# Patient Record
Sex: MALE | Race: WHITE | NOT HISPANIC OR LATINO | Employment: OTHER | URBAN - METROPOLITAN AREA
[De-identification: names, ages, dates, MRNs, and addresses within clinical notes are randomized per-mention and may not be internally consistent; named-entity substitution may affect disease eponyms.]

---

## 2017-01-23 RX ORDER — ESCITALOPRAM OXALATE 5 MG/1
TABLET ORAL
Qty: 30 TABLET | Refills: 0 | Status: SHIPPED | OUTPATIENT
Start: 2017-01-23 | End: 2017-03-05 | Stop reason: SDUPTHER

## 2017-01-25 ENCOUNTER — TELEPHONE (OUTPATIENT)
Dept: SLEEP MEDICINE | Facility: HOSPITAL | Age: 72
End: 2017-01-25

## 2017-01-25 NOTE — TELEPHONE ENCOUNTER
Patient called yesterday and today irate about needing a RX of Mirapex. The RX was faxed yesterday at 4:45 pm after the doctors were gone for the day. There was no one to sign yesterday. The patient had an appointment scheduled for 1/16/17 that he no showed for and It has been over a year since he was seen. I spoke with Kristen Abraham and she said call in 30 day supply and make appointment if patient doesn't keep appointment he won't have anymore called in.

## 2017-01-31 DIAGNOSIS — E55.9 VITAMIN D DEFICIENCY: Primary | ICD-10-CM

## 2017-01-31 DIAGNOSIS — E11.69 TYPE 2 DIABETES MELLITUS WITH OTHER SPECIFIED COMPLICATION (HCC): ICD-10-CM

## 2017-01-31 DIAGNOSIS — IMO0002 UNCONTROLLED TYPE 2 DIABETES MELLITUS WITH OTHER SPECIFIED COMPLICATION: ICD-10-CM

## 2017-01-31 DIAGNOSIS — E78.5 HYPERLIPIDEMIA, UNSPECIFIED HYPERLIPIDEMIA TYPE: Primary | ICD-10-CM

## 2017-01-31 DIAGNOSIS — E34.9 TESTOSTERONE DEFICIENCY: ICD-10-CM

## 2017-02-16 ENCOUNTER — OFFICE VISIT (OUTPATIENT)
Dept: ENDOCRINOLOGY | Age: 72
End: 2017-02-16

## 2017-02-16 ENCOUNTER — CONVERSION ENCOUNTER (OUTPATIENT)
Dept: ENDOCRINOLOGY | Age: 72
End: 2017-02-16

## 2017-02-16 VITALS
RESPIRATION RATE: 16 BRPM | HEIGHT: 70 IN | BODY MASS INDEX: 41.92 KG/M2 | SYSTOLIC BLOOD PRESSURE: 136 MMHG | WEIGHT: 292.8 LBS | DIASTOLIC BLOOD PRESSURE: 82 MMHG

## 2017-02-16 DIAGNOSIS — E34.9 TESTOSTERONE DEFICIENCY: ICD-10-CM

## 2017-02-16 DIAGNOSIS — I25.10 CORONARY ARTERIOSCLEROSIS IN NATIVE ARTERY: ICD-10-CM

## 2017-02-16 DIAGNOSIS — E55.9 VITAMIN D DEFICIENCY: ICD-10-CM

## 2017-02-16 DIAGNOSIS — E55.9 VITAMIN D DEFICIENCY: Primary | ICD-10-CM

## 2017-02-16 DIAGNOSIS — Z95.1 HISTORY OF CORONARY ARTERY BYPASS SURGERY: ICD-10-CM

## 2017-02-16 DIAGNOSIS — IMO0002 UNCONTROLLED TYPE 2 DIABETES MELLITUS WITH OTHER SPECIFIED COMPLICATION: Primary | ICD-10-CM

## 2017-02-16 DIAGNOSIS — N40.0 BENIGN NON-NODULAR PROSTATIC HYPERPLASIA WITHOUT LOWER URINARY TRACT SYMPTOMS: ICD-10-CM

## 2017-02-16 DIAGNOSIS — E78.5 HYPERLIPIDEMIA, UNSPECIFIED HYPERLIPIDEMIA TYPE: ICD-10-CM

## 2017-02-16 DIAGNOSIS — I25.9 ASYMPTOMATIC CORONARY HEART DISEASE: ICD-10-CM

## 2017-02-16 DIAGNOSIS — I10 BENIGN ESSENTIAL HYPERTENSION: ICD-10-CM

## 2017-02-16 PROCEDURE — 99215 OFFICE O/P EST HI 40 MIN: CPT | Performed by: INTERNAL MEDICINE

## 2017-02-16 RX ORDER — NATEGLINIDE 120 MG/1
120 TABLET ORAL
Qty: 90 TABLET | Refills: 11 | Status: SHIPPED | OUTPATIENT
Start: 2017-02-16 | End: 2017-07-06 | Stop reason: SDUPTHER

## 2017-02-16 RX ORDER — LIRAGLUTIDE 6 MG/ML
INJECTION SUBCUTANEOUS
Qty: 3 PEN | Refills: 5 | Status: SHIPPED | OUTPATIENT
Start: 2017-02-16 | End: 2017-07-06 | Stop reason: SDUPTHER

## 2017-02-16 RX ORDER — INSULIN GLARGINE 300 U/ML
75 INJECTION, SOLUTION SUBCUTANEOUS DAILY
Qty: 6 PEN | Refills: 5 | Status: SHIPPED | OUTPATIENT
Start: 2017-02-16 | End: 2017-07-06 | Stop reason: SDUPTHER

## 2017-02-16 NOTE — PROGRESS NOTES
"Subjective   James Mckeon is a 71 y.o. male seen for follow up for DM2, testosterone deficiency. No lab review. Patient is checking BG 3 times a day. No BG readings. He states that he is having trouble getting his BG down. He is eating dinner and checking BG and getting readings around 150. When he wakes up his BG is over 200. His fasting BG is running in the 200's. He doesn't feel that the Lantus is working correctly. He is also gaining weight, fatigue, and legs hurt. He denies any hypoglycemic episodes. He could not afford Androgel but could tell it made a difference and would like to try a cheaper option.   History of Present Illness this is a 71-year-old gentleman known patient with type II diabetes hypertension dyslipidemia hypogonadism as well as a erectile dysfunction and hyperuricemia.  Over the course of last 6 months he has had no significant health problems for which to go to emergency room or hospital.  He expressed frustration because of even with the combination of Lantus 50 units at bedtime and Humalog 15 units before breakfast 15-20 units before lunch and 20 units with supper he still runs his blood sugar greater than 200.  Visit Vitals   • /82   • Resp 16   • Ht 70\" (177.8 cm)   • Wt 292 lb 12.8 oz (133 kg)   • BMI 42.01 kg/m2       Current Outpatient Prescriptions:   •  allopurinol (ZYLOPRIM) 300 MG tablet, Take 300 mg by mouth daily., Disp: , Rfl:   •  ANDROGEL PUMP 20.25 MG/ACT (1.62%) gel, 2 pump to each should daily, Disp: 150 g, Rfl: 0  •  aspirin 81 MG EC tablet, Take 81 mg by mouth daily., Disp: , Rfl:   •  Calcium Carbonate-Vitamin D (CALCIUM-VITAMIN D) 500-200 MG-UNIT tablet per tablet, Take  by mouth daily., Disp: , Rfl:   •  escitalopram (LEXAPRO) 5 MG tablet, TAKE 1 TABLET BY MOUTH EVERY DAY, Disp: 30 tablet, Rfl: 0  •  Fenofibrate Micronized 90 MG capsule, Take 1 daily, Disp: 30 capsule, Rfl: 1  •  furosemide (LASIX) 40 MG tablet, Take 40 mg by mouth daily., Disp: , Rfl:   •  " glucose blood test strip, Test 4 times daily, Disp: 200 each, Rfl: 3  •  HUMALOG KWIKPEN 100 UNIT/ML solution pen-injector, INJECT UP TO 45 UNITS DAILY AS DIRECTED., Disp: 15 pen, Rfl: 3  •  Insulin Pen Needle 31G X 8 MM misc, , Disp: , Rfl:   •  JANUVIA 50 MG tablet, Take 1 tablet by mouth Daily., Disp: 30 tablet, Rfl: 5  •  LANTUS SOLOSTAR 100 UNIT/ML injection pen, Inject 50 Units under the skin Daily., Disp: 10 pen, Rfl: 5  •  losartan (COZAAR) 25 MG tablet, Take 25 mg by mouth daily., Disp: , Rfl:   •  LUMIGAN 0.01 % ophthalmic drops, INSTILL 1 DROP IN OU QHS, Disp: , Rfl: 5  •  metoprolol succinate XL (TOPROL-XL) 50 MG 24 hr tablet, Take 1.5 tablets by mouth every 12 (twelve) hours., Disp: , Rfl:   •  potassium chloride (MICRO-K) 10 MEQ CR capsule, Take 10 mEq by mouth daily., Disp: , Rfl:   •  pramipexole (MIRAPEX) 0.25 MG tablet, Take 0.25 mg by mouth daily., Disp: , Rfl:   •  rosuvastatin (CRESTOR) 20 MG tablet, Take 20 mg by mouth daily., Disp: , Rfl:   •  sildenafil (VIAGRA) 50 MG tablet, Take 50 mg by mouth As Needed., Disp: , Rfl:   •  VASCEPA 1 G capsule capsule, Take 2 g by mouth 2 (Two) Times a Day With Meals., Disp: 120 capsule, Rfl: 5    No Known Allergies        The following portions of the patient's history were reviewed and updated as appropriate: allergies, current medications, past family history, past medical history, past social history, past surgical history and problem list.    Review of Systems   Constitutional: Positive for unexpected weight change.   HENT: Negative.    Eyes: Negative.    Respiratory: Negative.    Cardiovascular: Negative.    Gastrointestinal: Negative.    Endocrine: Negative.    Genitourinary: Negative.    Musculoskeletal: Negative.    Skin: Negative.    Allergic/Immunologic: Negative.    Neurological: Negative.    Hematological: Negative.    Psychiatric/Behavioral: Negative.        Objective   Physical Exam   Constitutional: He is oriented to person, place, and time.  He appears well-developed and well-nourished. No distress.   HENT:   Head: Normocephalic and atraumatic.   Right Ear: External ear normal.   Left Ear: External ear normal.   Nose: Nose normal.   Mouth/Throat: Oropharynx is clear and moist. No oropharyngeal exudate.   Eyes: Conjunctivae and EOM are normal. Pupils are equal, round, and reactive to light. Right eye exhibits no discharge. Left eye exhibits no discharge. No scleral icterus.   Neck: Normal range of motion. Neck supple. No JVD present. Carotid bruit is not present. No tracheal deviation, no edema and no erythema present. No thyromegaly present.   Cardiovascular: Normal rate, regular rhythm, normal heart sounds and intact distal pulses.  Exam reveals no gallop and no friction rub.    No murmur heard.  Healed the scar of coronary artery bypass graft surgery in anterior chest midline   Pulmonary/Chest: Effort normal and breath sounds normal. No stridor. No respiratory distress. He has no wheezes. He has no rales. He exhibits no tenderness.   Abdominal: Soft. Bowel sounds are normal. He exhibits no distension and no mass. There is no tenderness. There is no rebound and no guarding. No hernia.   Musculoskeletal: Normal range of motion. He exhibits edema. He exhibits no tenderness or deformity.   Lymphadenopathy:     He has no cervical adenopathy.   Neurological: He is alert and oriented to person, place, and time. He has normal reflexes. He displays normal reflexes. No cranial nerve deficit. He exhibits normal muscle tone. Coordination normal.   Skin: Skin is warm and dry. No rash noted. He is not diaphoretic. No erythema. No pallor.   Psychiatric: He has a normal mood and affect. His behavior is normal. Judgment and thought content normal.   Nursing note and vitals reviewed.        Assessment/Plan   Diagnoses and all orders for this visit:    Uncontrolled type 2 diabetes mellitus with other specified complication  -     T4 & TSH ([facility])  -     Uric Acid  -      Vitamin D 25 Hydroxy  -     Comprehensive Metabolic Panel  -     C-Peptide  -     Hemoglobin A1c  -     Lipid Panel  -     MicroAlbumin, Urine, Random  -     Hemoglobin & Hematocrit, Blood  -     T4 & TSH ([facility]); Future  -     TestT+TestF+SHBG; Future  -     Uric Acid; Future  -     Vitamin D 25 Hydroxy; Future  -     Comprehensive Metabolic Panel; Future  -     C-Peptide; Future  -     Hemoglobin A1c; Future  -     Lipid Panel; Future  -     MicroAlbumin, Urine, Random; Future  -     Hemoglobin & Hematocrit, Blood; Future    Testosterone deficiency  -     T4 & TSH ([facility])  -     Uric Acid  -     Vitamin D 25 Hydroxy  -     Comprehensive Metabolic Panel  -     C-Peptide  -     Hemoglobin A1c  -     Lipid Panel  -     MicroAlbumin, Urine, Random  -     Hemoglobin & Hematocrit, Blood  -     T4 & TSH ([facility]); Future  -     TestT+TestF+SHBG; Future  -     Uric Acid; Future  -     Vitamin D 25 Hydroxy; Future  -     Comprehensive Metabolic Panel; Future  -     C-Peptide; Future  -     Hemoglobin A1c; Future  -     Lipid Panel; Future  -     MicroAlbumin, Urine, Random; Future  -     Hemoglobin & Hematocrit, Blood; Future    Coronary arteriosclerosis in native artery  -     T4 & TSH ([facility])  -     Uric Acid  -     Vitamin D 25 Hydroxy  -     Comprehensive Metabolic Panel  -     C-Peptide  -     Hemoglobin A1c  -     Lipid Panel  -     MicroAlbumin, Urine, Random  -     Hemoglobin & Hematocrit, Blood  -     T4 & TSH ([facility]); Future  -     TestT+TestF+SHBG; Future  -     Uric Acid; Future  -     Vitamin D 25 Hydroxy; Future  -     Comprehensive Metabolic Panel; Future  -     C-Peptide; Future  -     Hemoglobin A1c; Future  -     Lipid Panel; Future  -     MicroAlbumin, Urine, Random; Future  -     Hemoglobin & Hematocrit, Blood; Future    Hyperlipidemia, unspecified hyperlipidemia type  -     T4 & TSH ([facility])  -     Uric Acid  -     Vitamin D 25 Hydroxy  -     Comprehensive Metabolic Panel  -     C-Peptide  -      Hemoglobin A1c  -     Lipid Panel  -     MicroAlbumin, Urine, Random  -     Hemoglobin & Hematocrit, Blood  -     T4 & TSH ([facility]); Future  -     TestT+TestF+SHBG; Future  -     Uric Acid; Future  -     Vitamin D 25 Hydroxy; Future  -     Comprehensive Metabolic Panel; Future  -     C-Peptide; Future  -     Hemoglobin A1c; Future  -     Lipid Panel; Future  -     MicroAlbumin, Urine, Random; Future  -     Hemoglobin & Hematocrit, Blood; Future    Benign essential hypertension  -     T4 & TSH ([facility])  -     Uric Acid  -     Vitamin D 25 Hydroxy  -     Comprehensive Metabolic Panel  -     C-Peptide  -     Hemoglobin A1c  -     Lipid Panel  -     MicroAlbumin, Urine, Random  -     Hemoglobin & Hematocrit, Blood  -     T4 & TSH ([facility]); Future  -     TestT+TestF+SHBG; Future  -     Uric Acid; Future  -     Vitamin D 25 Hydroxy; Future  -     Comprehensive Metabolic Panel; Future  -     C-Peptide; Future  -     Hemoglobin A1c; Future  -     Lipid Panel; Future  -     MicroAlbumin, Urine, Random; Future  -     Hemoglobin & Hematocrit, Blood; Future    History of coronary artery bypass surgery  -     T4 & TSH ([facility])  -     Uric Acid  -     Vitamin D 25 Hydroxy  -     Comprehensive Metabolic Panel  -     C-Peptide  -     Hemoglobin A1c  -     Lipid Panel  -     MicroAlbumin, Urine, Random  -     Hemoglobin & Hematocrit, Blood  -     T4 & TSH ([facility]); Future  -     TestT+TestF+SHBG; Future  -     Uric Acid; Future  -     Vitamin D 25 Hydroxy; Future  -     Comprehensive Metabolic Panel; Future  -     C-Peptide; Future  -     Hemoglobin A1c; Future  -     Lipid Panel; Future  -     MicroAlbumin, Urine, Random; Future  -     Hemoglobin & Hematocrit, Blood; Future    Benign non-nodular prostatic hyperplasia without lower urinary tract symptoms  -     T4 & TSH ([facility])  -     Uric Acid  -     Vitamin D 25 Hydroxy  -     Comprehensive Metabolic Panel  -     C-Peptide  -     Hemoglobin A1c  -     Lipid Panel  -      MicroAlbumin, Urine, Random  -     Hemoglobin & Hematocrit, Blood  -     T4 & TSH ([facility]); Future  -     TestT+TestF+SHBG; Future  -     Uric Acid; Future  -     Vitamin D 25 Hydroxy; Future  -     Comprehensive Metabolic Panel; Future  -     C-Peptide; Future  -     Hemoglobin A1c; Future  -     Lipid Panel; Future  -     MicroAlbumin, Urine, Random; Future  -     Hemoglobin & Hematocrit, Blood; Future    Asymptomatic coronary heart disease  -     T4 & TSH ([facility])  -     Uric Acid  -     Vitamin D 25 Hydroxy  -     Comprehensive Metabolic Panel  -     C-Peptide  -     Hemoglobin A1c  -     Lipid Panel  -     MicroAlbumin, Urine, Random  -     Hemoglobin & Hematocrit, Blood  -     T4 & TSH ([facility]); Future  -     TestT+TestF+SHBG; Future  -     Uric Acid; Future  -     Vitamin D 25 Hydroxy; Future  -     Comprehensive Metabolic Panel; Future  -     C-Peptide; Future  -     Hemoglobin A1c; Future  -     Lipid Panel; Future  -     MicroAlbumin, Urine, Random; Future  -     Hemoglobin & Hematocrit, Blood; Future    Vitamin D deficiency  -     T4 & TSH ([facility])  -     Uric Acid  -     Vitamin D 25 Hydroxy  -     Comprehensive Metabolic Panel  -     C-Peptide  -     Hemoglobin A1c  -     Lipid Panel  -     MicroAlbumin, Urine, Random  -     Hemoglobin & Hematocrit, Blood  -     T4 & TSH ([facility]); Future  -     TestT+TestF+SHBG; Future  -     Uric Acid; Future  -     Vitamin D 25 Hydroxy; Future  -     Comprehensive Metabolic Panel; Future  -     C-Peptide; Future  -     Hemoglobin A1c; Future  -     Lipid Panel; Future  -     MicroAlbumin, Urine, Random; Future  -     Hemoglobin & Hematocrit, Blood; Future    Other orders  -     VICTOZA 18 MG/3ML solution pen-injector; 0.6 mg daily for 7 days, 1.2 mg daily for 7 days and then 1.8 mg daily  -     TOUJEO SOLOSTAR 300 UNIT/ML solution pen-injector; Inject 75 Units under the skin Daily.  -     nateglinide (STARLIX) 120 MG tablet; Take 1 tablet by mouth 3 (Three) Times  a Day Before Meals.               In summary I saw and examined this 71-year-old gentleman for above-mentioned problems.  I reviewed his laboratory evaluation of 10/06/2016 and at this point we will go ahead and order an extensive laboratory evaluation and once the results come back we will go ahead and call for any possible modifications.  At this time I went ahead and is stopped his Januvia as well as Humalog and Lantus.  I started him on Touche oh 75 units every morning and Starlix 120 mg before each meal as well as Victoza 0.6 mg daily for 7 days and 1.2 mg daily for 7 days and then get on the maintenance dose of 1.8 mg daily.  He will continue rest of his prescriptions and will see Ms. Pamella Fontaine in 4 months or sooner if needed with laboratory evaluation prior to each office visit.  This office visit 50% of it spent on patient counseling exceeded 40 minutes.

## 2017-02-17 ENCOUNTER — TELEPHONE (OUTPATIENT)
Dept: ENDOCRINOLOGY | Age: 72
End: 2017-02-17

## 2017-02-17 LAB
25(OH)D3+25(OH)D2 SERPL-MCNC: 36.6 NG/ML (ref 30–100)
ALBUMIN SERPL-MCNC: 4.4 G/DL (ref 3.5–5.2)
ALBUMIN/GLOB SERPL: 1.6 G/DL
ALP SERPL-CCNC: 52 U/L (ref 39–117)
ALT SERPL-CCNC: 28 U/L (ref 1–41)
AST SERPL-CCNC: 15 U/L (ref 1–40)
BILIRUB SERPL-MCNC: 0.4 MG/DL (ref 0.1–1.2)
BUN SERPL-MCNC: 28 MG/DL (ref 8–23)
BUN/CREAT SERPL: 14.6 (ref 7–25)
C PEPTIDE SERPL-MCNC: 9.2 NG/ML (ref 1.1–4.4)
CALCIUM SERPL-MCNC: 10 MG/DL (ref 8.6–10.5)
CHLORIDE SERPL-SCNC: 102 MMOL/L (ref 98–107)
CHOLEST SERPL-MCNC: 157 MG/DL (ref 0–200)
CO2 SERPL-SCNC: 28.1 MMOL/L (ref 22–29)
CREAT SERPL-MCNC: 1.92 MG/DL (ref 0.76–1.27)
GLOBULIN SER CALC-MCNC: 2.8 GM/DL
GLUCOSE SERPL-MCNC: 184 MG/DL (ref 65–99)
HBA1C MFR BLD: 7.69 % (ref 4.8–5.6)
HCT VFR BLD AUTO: 47 % (ref 40.4–52.2)
HDLC SERPL-MCNC: 42 MG/DL (ref 40–60)
HGB BLD-MCNC: 14.7 G/DL (ref 13.7–17.6)
LDLC SERPL CALC-MCNC: 64 MG/DL (ref 0–100)
POTASSIUM SERPL-SCNC: 4.2 MMOL/L (ref 3.5–5.2)
PROT SERPL-MCNC: 7.2 G/DL (ref 6–8.5)
SODIUM SERPL-SCNC: 145 MMOL/L (ref 136–145)
T4 SERPL-MCNC: 7.6 MCG/DL (ref 4.5–11.7)
TRIGL SERPL-MCNC: 256 MG/DL (ref 0–150)
TSH SERPL DL<=0.005 MIU/L-ACNC: 1.36 MIU/ML (ref 0.27–4.2)
URATE SERPL-MCNC: 8.1 MG/DL (ref 3.4–7)
VLDLC SERPL CALC-MCNC: 51.2 MG/DL (ref 5–40)

## 2017-02-20 ENCOUNTER — TELEPHONE (OUTPATIENT)
Dept: ENDOCRINOLOGY | Age: 72
End: 2017-02-20

## 2017-02-23 ENCOUNTER — HOSPITAL ENCOUNTER (OUTPATIENT)
Dept: SLEEP MEDICINE | Facility: HOSPITAL | Age: 72
Discharge: HOME | End: 2017-02-23
Admitting: INTERNAL MEDICINE

## 2017-02-23 PROCEDURE — G0463 HOSPITAL OUTPT CLINIC VISIT: HCPCS

## 2017-02-24 ENCOUNTER — TELEPHONE (OUTPATIENT)
Dept: ENDOCRINOLOGY | Age: 72
End: 2017-02-24

## 2017-02-27 ENCOUNTER — TELEPHONE (OUTPATIENT)
Dept: ENDOCRINOLOGY | Age: 72
End: 2017-02-27

## 2017-02-28 NOTE — PROGRESS NOTES
DATE OF SERVICE: 02/23/2017    PRIMARY CARE PHYSICIAN: Radha Haji MD    REFERRING PHYSICIAN: Radha Haji MD    I had the pleasure of seeing the patient in the office today. Below please find summary of positive pertinent findings and conclusions.     REASON FOR VISIT: Annual followup.    HISTORY OF PERSENT ILLNESS: Patient is a pleasant 71-year-old male who is here today for an annual followup on obstructive sleep apnea. He needs all his CPAP supplies renewed. He goes to bed at 10 p.m. and is up at 6:25 a.m. He gets 8-1/2 hours of sleep per night. His Alcova Sleepiness Score today is 5. He uses a CPAP device every night with average pressures of 13. He denies snoring or gasping respirations. He subjectively notices improvement in daytime sleepiness with the CPAP device on board.  He feels that his full face mask fits well. He last changed it a year ago.     CURRENT MEDICATIONS:  1.  Axiron.  2.  Starlix.  3.  Victoza.  4.  Toujeo.  5.  Lexapro.  6.  Fenofibrate.  7.  Biseptol.  8.  Humalog.    Rest of the medications reviewed    PHYSICAL EXAMINATION:  VITAL SIGNS: Height 70 inches, weight 294 pounds, BMI 42, blood pressure 134/70, heart rate 91, neck size 19.5.  HEENT: Class IV Mallampati airway. Large-size tongue. No evidence of exudate.   NECK: Trachea in midline.  LUNGS: Respiratory effort nonlabored.   HEART: Regular rate and rhythm. No murmurs or gallops.  ABDOMEN: Soft and nontender. Bowel sounds  are present.  EXTREMITIES: No evidence of edema. Pedal pulses positive.     DIAGNOSTIC DATA: Download dates 11/25/2016 through 02/22/2017 shows 100% compliance with average use of 7 hours 32 minutes on auto CPAP with average pressures of 13.1 and AHI of 1.8.     ASSESSMENT:  1.  Obstructive sleep apnea.  2.  Periodic limb movement disorder.   3.  Allergic rhinitis, history of.   4.  Hypertension.   5.  Diabetes mellitus.  6.  Coronary artery disease.      PLAN: The patient requires all his CPAP supplies  renewed, and I will send an order to [facility] to get him new accessories. He does have history of periodic limb movement disorder, and I have refilled the pramipexole 0.25 mg also for him to continue to use nightly. I asked him to take the medication 1-3 hours prior to bedtime. I have provided him with 11 refills, as he will follow up with us in a year. He also reported allergic rhinitis-type symptoms, and I would like to try him on fluticasone nasal spray. I have provided him a written prescription to see if it will be less expensive to get it through insurance.    I asked him to follow up with Dr. Barone in 1 year to see how he is doing.     I appreciate the opportunity of participating in this patient's care.      [facility] L[facility]  AZ:jovana  D:   02/27/2017 14:26:00  T:   02/28/2017 02:21:17  Job ID:   [id number]  Document ID:   [id number]  cc:   Radha Haji M.D.

## 2017-03-01 ENCOUNTER — RESULTS ENCOUNTER (OUTPATIENT)
Dept: ENDOCRINOLOGY | Age: 72
End: 2017-03-01

## 2017-03-01 DIAGNOSIS — IMO0002 UNCONTROLLED TYPE 2 DIABETES MELLITUS WITH OTHER SPECIFIED COMPLICATION: ICD-10-CM

## 2017-03-01 DIAGNOSIS — E11.69 TYPE 2 DIABETES MELLITUS WITH OTHER SPECIFIED COMPLICATION (HCC): ICD-10-CM

## 2017-03-01 DIAGNOSIS — E55.9 VITAMIN D DEFICIENCY: ICD-10-CM

## 2017-03-01 DIAGNOSIS — E34.9 TESTOSTERONE DEFICIENCY: ICD-10-CM

## 2017-03-01 DIAGNOSIS — E78.5 HYPERLIPIDEMIA, UNSPECIFIED HYPERLIPIDEMIA TYPE: ICD-10-CM

## 2017-03-05 RX ORDER — FENOFIBRATE 90 MG/1
CAPSULE ORAL
Qty: 30 CAPSULE | Refills: 0 | Status: CANCELLED | OUTPATIENT
Start: 2017-03-05

## 2017-03-06 RX ORDER — ESCITALOPRAM OXALATE 5 MG/1
TABLET ORAL
Qty: 30 TABLET | Refills: 0 | Status: SHIPPED | OUTPATIENT
Start: 2017-03-06 | End: 2017-03-20 | Stop reason: SDUPTHER

## 2017-03-06 RX ORDER — FENOFIBRATE 90 MG/1
CAPSULE ORAL
Qty: 30 CAPSULE | Refills: 3 | Status: SHIPPED | OUTPATIENT
Start: 2017-03-06 | End: 2017-04-10 | Stop reason: CLARIF

## 2017-03-17 ENCOUNTER — RESULTS ENCOUNTER (OUTPATIENT)
Dept: ENDOCRINOLOGY | Age: 72
End: 2017-03-17

## 2017-03-17 DIAGNOSIS — E55.9 VITAMIN D DEFICIENCY: ICD-10-CM

## 2017-03-21 RX ORDER — ESCITALOPRAM OXALATE 5 MG/1
TABLET ORAL
Qty: 30 TABLET | Refills: 0 | Status: SHIPPED | OUTPATIENT
Start: 2017-03-21 | End: 2017-05-01 | Stop reason: SDUPTHER

## 2017-04-10 RX ORDER — FENOFIBRATE 145 MG/1
145 TABLET, COATED ORAL DAILY
Qty: 30 TABLET | Refills: 5 | Status: SHIPPED | OUTPATIENT
Start: 2017-04-10 | End: 2017-07-06 | Stop reason: SDUPTHER

## 2017-05-01 RX ORDER — ESCITALOPRAM OXALATE 5 MG/1
TABLET ORAL
Qty: 30 TABLET | Refills: 0 | Status: SHIPPED | OUTPATIENT
Start: 2017-05-01 | End: 2017-05-30 | Stop reason: SDUPTHER

## 2017-05-30 RX ORDER — ESCITALOPRAM OXALATE 5 MG/1
TABLET ORAL
Qty: 30 TABLET | Refills: 0 | Status: SHIPPED | OUTPATIENT
Start: 2017-05-30 | End: 2017-06-26 | Stop reason: SDUPTHER

## 2017-06-12 ENCOUNTER — RESULTS ENCOUNTER (OUTPATIENT)
Dept: ENDOCRINOLOGY | Age: 72
End: 2017-06-12

## 2017-06-12 DIAGNOSIS — N40.0 BENIGN NON-NODULAR PROSTATIC HYPERPLASIA WITHOUT LOWER URINARY TRACT SYMPTOMS: ICD-10-CM

## 2017-06-12 DIAGNOSIS — Z95.1 HISTORY OF CORONARY ARTERY BYPASS SURGERY: ICD-10-CM

## 2017-06-12 DIAGNOSIS — IMO0002 UNCONTROLLED TYPE 2 DIABETES MELLITUS WITH OTHER SPECIFIED COMPLICATION: ICD-10-CM

## 2017-06-12 DIAGNOSIS — I25.10 CORONARY ARTERIOSCLEROSIS IN NATIVE ARTERY: ICD-10-CM

## 2017-06-12 DIAGNOSIS — E78.5 HYPERLIPIDEMIA, UNSPECIFIED HYPERLIPIDEMIA TYPE: ICD-10-CM

## 2017-06-12 DIAGNOSIS — E55.9 VITAMIN D DEFICIENCY: ICD-10-CM

## 2017-06-12 DIAGNOSIS — I10 BENIGN ESSENTIAL HYPERTENSION: ICD-10-CM

## 2017-06-12 DIAGNOSIS — E34.9 TESTOSTERONE DEFICIENCY: ICD-10-CM

## 2017-06-12 DIAGNOSIS — I25.9 ASYMPTOMATIC CORONARY HEART DISEASE: ICD-10-CM

## 2017-06-27 RX ORDER — ESCITALOPRAM OXALATE 5 MG/1
TABLET ORAL
Qty: 30 TABLET | Refills: 0 | Status: SHIPPED | OUTPATIENT
Start: 2017-06-27 | End: 2017-07-27 | Stop reason: SDUPTHER

## 2017-06-29 LAB
25(OH)D3+25(OH)D2 SERPL-MCNC: 35.9 NG/ML (ref 30–100)
ALBUMIN SERPL-MCNC: 4.2 G/DL (ref 3.5–5.2)
ALBUMIN/GLOB SERPL: 1.5 G/DL
ALP SERPL-CCNC: 41 U/L (ref 39–117)
ALT SERPL-CCNC: 33 U/L (ref 1–41)
AST SERPL-CCNC: 22 U/L (ref 1–40)
BILIRUB SERPL-MCNC: 0.3 MG/DL (ref 0.1–1.2)
BUN SERPL-MCNC: 25 MG/DL (ref 8–23)
BUN/CREAT SERPL: 17.7 (ref 7–25)
C PEPTIDE SERPL-MCNC: 10 NG/ML (ref 1.1–4.4)
CALCIUM SERPL-MCNC: 10.3 MG/DL (ref 8.6–10.5)
CHLORIDE SERPL-SCNC: 104 MMOL/L (ref 98–107)
CHOLEST SERPL-MCNC: 136 MG/DL (ref 0–200)
CO2 SERPL-SCNC: 25.3 MMOL/L (ref 22–29)
CONV COMMENT: ABNORMAL
CREAT SERPL-MCNC: 1.41 MG/DL (ref 0.76–1.27)
GLOBULIN SER CALC-MCNC: 2.8 GM/DL
GLUCOSE SERPL-MCNC: 196 MG/DL (ref 65–99)
HBA1C MFR BLD: 6.8 % (ref 4.8–5.6)
HCT VFR BLD AUTO: 46.9 % (ref 40.4–52.2)
HDLC SERPL-MCNC: 41 MG/DL (ref 40–60)
HGB BLD-MCNC: 14.5 G/DL (ref 13.7–17.6)
LDLC SERPL CALC-MCNC: 61 MG/DL (ref 0–100)
MICROALBUMIN UR-MCNC: 9.7 UG/ML
POTASSIUM SERPL-SCNC: 4.7 MMOL/L (ref 3.5–5.2)
PROT SERPL-MCNC: 7 G/DL (ref 6–8.5)
SHBG SERPL-SCNC: 23.4 NMOL/L (ref 19.3–76.4)
SODIUM SERPL-SCNC: 144 MMOL/L (ref 136–145)
T4 SERPL-MCNC: 7.87 MCG/DL (ref 4.5–11.7)
TESTOST FREE SERPL-MCNC: 20 PG/ML (ref 6.6–18.1)
TESTOST SERPL-MCNC: 550 NG/DL (ref 348–1197)
TRIGL SERPL-MCNC: 170 MG/DL (ref 0–150)
TSH SERPL DL<=0.005 MIU/L-ACNC: 1.23 MIU/ML (ref 0.27–4.2)
URATE SERPL-MCNC: 6.6 MG/DL (ref 3.4–7)
VLDLC SERPL CALC-MCNC: 34 MG/DL (ref 5–40)

## 2017-07-06 ENCOUNTER — OFFICE VISIT (OUTPATIENT)
Dept: ENDOCRINOLOGY | Age: 72
End: 2017-07-06

## 2017-07-06 VITALS
HEIGHT: 70 IN | BODY MASS INDEX: 40.69 KG/M2 | WEIGHT: 284.2 LBS | SYSTOLIC BLOOD PRESSURE: 130 MMHG | DIASTOLIC BLOOD PRESSURE: 80 MMHG

## 2017-07-06 DIAGNOSIS — E34.9 TESTOSTERONE DEFICIENCY: ICD-10-CM

## 2017-07-06 DIAGNOSIS — E11.65 TYPE 2 DIABETES MELLITUS WITH HYPERGLYCEMIA, UNSPECIFIED LONG TERM INSULIN USE STATUS: ICD-10-CM

## 2017-07-06 DIAGNOSIS — IMO0002 UNCONTROLLED TYPE 2 DIABETES MELLITUS WITH OTHER SPECIFIED COMPLICATION, WITH LONG-TERM CURRENT USE OF INSULIN: Primary | ICD-10-CM

## 2017-07-06 DIAGNOSIS — I10 BENIGN ESSENTIAL HYPERTENSION: ICD-10-CM

## 2017-07-06 DIAGNOSIS — N28.9 RENAL INSUFFICIENCY: ICD-10-CM

## 2017-07-06 DIAGNOSIS — N18.30 CHRONIC KIDNEY DISEASE, STAGE III (MODERATE) (HCC): ICD-10-CM

## 2017-07-06 DIAGNOSIS — E78.5 HYPERLIPIDEMIA, UNSPECIFIED HYPERLIPIDEMIA TYPE: ICD-10-CM

## 2017-07-06 DIAGNOSIS — E78.5 DYSLIPIDEMIA: ICD-10-CM

## 2017-07-06 PROCEDURE — 99214 OFFICE O/P EST MOD 30 MIN: CPT | Performed by: NURSE PRACTITIONER

## 2017-07-06 RX ORDER — NATEGLINIDE 120 MG/1
120 TABLET ORAL
Qty: 90 TABLET | Refills: 5 | Status: SHIPPED | OUTPATIENT
Start: 2017-07-06 | End: 2017-12-04 | Stop reason: SDUPTHER

## 2017-07-06 RX ORDER — FENOFIBRATE 145 MG/1
145 TABLET, COATED ORAL DAILY
Qty: 30 TABLET | Refills: 5 | Status: SHIPPED | OUTPATIENT
Start: 2017-07-06 | End: 2017-11-07

## 2017-07-06 RX ORDER — ICOSAPENT ETHYL 1000 MG/1
2 CAPSULE ORAL 2 TIMES DAILY WITH MEALS
Qty: 120 CAPSULE | Refills: 5 | Status: SHIPPED | OUTPATIENT
Start: 2017-07-06 | End: 2017-12-04

## 2017-07-06 RX ORDER — LIRAGLUTIDE 6 MG/ML
1.8 INJECTION SUBCUTANEOUS DAILY
Qty: 3 PEN | Refills: 5 | Status: SHIPPED | OUTPATIENT
Start: 2017-07-06 | End: 2017-12-12 | Stop reason: SDUPTHER

## 2017-07-06 RX ORDER — INSULIN GLARGINE 300 U/ML
75 INJECTION, SOLUTION SUBCUTANEOUS DAILY
Qty: 6 PEN | Refills: 5 | Status: SHIPPED | OUTPATIENT
Start: 2017-07-06 | End: 2017-12-12 | Stop reason: SDUPTHER

## 2017-07-06 RX ORDER — LIRAGLUTIDE 6 MG/ML
INJECTION SUBCUTANEOUS
Qty: 3 PEN | Refills: 5 | Status: SHIPPED | OUTPATIENT
Start: 2017-07-06 | End: 2017-07-06 | Stop reason: SDUPTHER

## 2017-07-06 NOTE — PROGRESS NOTES
Subjective   James Mckeon is a 72 y.o. male is here today for follow-up.    History of Present Illness  Encounter Diagnoses   Name Primary?   • Uncontrolled type 2 diabetes mellitus with other specified complication, with long-term current use of insulin Yes   • Renal insufficiency    • Dyslipidemia    • Benign essential hypertension    • Hyperlipidemia, unspecified hyperlipidemia type    • Testosterone deficiency      This is a 72-year-old male patient here today for routine follow-up visit.  He is being seen for the above-mentioned problems.  He had recent labs done which were reviewed and he was provided a copy he states he is feeling really good and has lost roughly 14-15 pounds.  He states the medications that he is currently on are working really well to help him lose weight and also keep his diabetes under better control.  He has had no incidence of which she's had to go the emergency room he has had no significant hypoglycemic reaction.  He has not been taking his fish oil routinely.  He also quit taking his allopurinol unless he has exacerbation of gout.  He is currently not taking any mealtime insulin.  All his medications were reviewed at today's visit.    The following portions of the patient's history were reviewed and updated as appropriate: allergies, current medications, past family history, past medical history, past social history, past surgical history and problem list.    Review of Systems   Constitutional: Negative for fatigue.   HENT: Negative for trouble swallowing.    Eyes: Negative for visual disturbance.   Respiratory: Negative for shortness of breath.    Cardiovascular: Negative for leg swelling.   Endocrine: Negative for polyphagia.   Skin: Negative for wound.   Neurological: Negative for numbness.       Objective   Physical Exam   Constitutional: He is oriented to person, place, and time. He appears well-developed and well-nourished. No distress.   HENT:   Head: Normocephalic and  atraumatic.   Right Ear: External ear normal.   Left Ear: External ear normal.   Nose: Nose normal.   Eyes: Pupils are equal, round, and reactive to light. Right eye exhibits no discharge. Left eye exhibits no discharge.   Neck: Normal range of motion. Neck supple. Carotid bruit is not present. No tracheal deviation, no edema and no erythema present. No thyromegaly present.   Cardiovascular: Normal rate, regular rhythm, normal heart sounds and intact distal pulses.  Exam reveals no gallop and no friction rub.    No murmur heard.  Pulmonary/Chest: Effort normal and breath sounds normal. No respiratory distress. He has no wheezes. He has no rales.   Abdominal: Soft. Bowel sounds are normal. He exhibits no distension. There is no tenderness.   Musculoskeletal: Normal range of motion. He exhibits edema. He exhibits no deformity.   Lymphadenopathy:     He has no cervical adenopathy.   Neurological: He is alert and oriented to person, place, and time. Coordination normal.   Skin: Skin is warm and dry. No rash noted. He is not diaphoretic. No erythema. No pallor.   Psychiatric: He has a normal mood and affect. His behavior is normal. Judgment and thought content normal.   Nursing note and vitals reviewed.    Results for orders placed or performed in visit on 06/12/17   T4 & TSH ([facility])   Result Value Ref Range    TSH 1.230 0.270 - 4.200 mIU/mL    T4, Total 7.87 4.50 - 11.70 mcg/dL   TestT+TestF+SHBG   Result Value Ref Range    Testosterone, Total 550 348 - 1197 ng/dL    Comment Comment     Testosterone, Free 20.0 (H) 6.6 - 18.1 pg/mL    Sex Hormone Binding Globulin 23.4 19.3 - 76.4 nmol/L   Uric Acid   Result Value Ref Range    Uric Acid 6.6 3.4 - 7.0 mg/dL   Vitamin D 25 Hydroxy   Result Value Ref Range    25 Hydroxy, Vitamin D 35.9 30.0 - 100.0 ng/mL   Comprehensive Metabolic Panel   Result Value Ref Range    Glucose 196 (H) 65 - 99 mg/dL    BUN 25 (H) 8 - 23 mg/dL    Creatinine 1.41 (H) 0.76 - 1.27 mg/dL    eGFR Non   Am 50 (L) >60 mL/min/1.73    eGFR African Am 60 (L) >60 mL/min/1.73    BUN/Creatinine Ratio 17.7 7.0 - 25.0    Sodium 144 136 - 145 mmol/L    Potassium 4.7 3.5 - 5.2 mmol/L    Chloride 104 98 - 107 mmol/L    Total CO2 25.3 22.0 - 29.0 mmol/L    Calcium 10.3 8.6 - 10.5 mg/dL    Total Protein 7.0 6.0 - 8.5 g/dL    Albumin 4.20 3.50 - 5.20 g/dL    Globulin 2.8 gm/dL    A/G Ratio 1.5 g/dL    Total Bilirubin 0.3 0.1 - 1.2 mg/dL    Alkaline Phosphatase 41 39 - 117 U/L    AST (SGOT) 22 1 - 40 U/L    ALT (SGPT) 33 1 - 41 U/L   C-Peptide   Result Value Ref Range    C-Peptide 10.0 (H) 1.1 - 4.4 ng/mL   Hemoglobin A1c   Result Value Ref Range    Hemoglobin A1C 6.80 (H) 4.80 - 5.60 %   Lipid Panel   Result Value Ref Range    Total Cholesterol 136 0 - 200 mg/dL    Triglycerides 170 (H) 0 - 150 mg/dL    HDL Cholesterol 41 40 - 60 mg/dL    VLDL Cholesterol 34 5 - 40 mg/dL    LDL Cholesterol  61 0 - 100 mg/dL   Hemoglobin & Hematocrit, Blood   Result Value Ref Range    Hemoglobin 14.5 13.7 - 17.6 g/dL    Hematocrit 46.9 40.4 - 52.2 %   MicroAlbumin, Urine, Random   Result Value Ref Range    Microalbumin, Urine 9.7 Not Estab. ug/mL         Assessment/Plan   James was seen today for diabetes, hypogonadism, hyperlipidemia, hypertension and vitamin d deficiency.    Diagnoses and all orders for this visit:    Uncontrolled type 2 diabetes mellitus with other specified complication, with long-term current use of insulin    Renal insufficiency    Dyslipidemia    Benign essential hypertension    Hyperlipidemia, unspecified hyperlipidemia type    Testosterone deficiency      In summary, patient was seen and examined.  His recent labs were reviewed and he was provided a copy.  He will continue on his current medications as prescribed.  I've upped his medication list to remove allopurinol since he has not been taking it regularly and also a mealtime insulin Humalog.  His medications will be refilled per his request.  MAGDALENA Mendoza was  reviewed at today's visit.  His testosterone level is in satisfactory range.  His cholesterol is uncontrolled and his triglycerides have improved.  His renal function has also improved.  He has a history of having only one kidney.  His recent hemoglobin A1c reflects that his diabetes is now under adequate control.  He has lost weight and is watching his diet.  He will follow-up in 4 months with labs prior.  I've encouraged him to contact the office should you have any questions or concerns prior to then

## 2017-07-28 RX ORDER — ESCITALOPRAM OXALATE 5 MG/1
TABLET ORAL
Qty: 30 TABLET | Refills: 0 | Status: SHIPPED | OUTPATIENT
Start: 2017-07-28 | End: 2017-08-25 | Stop reason: SDUPTHER

## 2017-08-22 RX ORDER — ESCITALOPRAM OXALATE 5 MG/1
TABLET ORAL
Qty: 30 TABLET | Refills: 0 | OUTPATIENT
Start: 2017-08-22

## 2017-08-25 ENCOUNTER — OFFICE VISIT (OUTPATIENT)
Dept: INTERNAL MEDICINE | Facility: CLINIC | Age: 72
End: 2017-08-25

## 2017-08-25 VITALS
DIASTOLIC BLOOD PRESSURE: 86 MMHG | WEIGHT: 284 LBS | SYSTOLIC BLOOD PRESSURE: 152 MMHG | HEIGHT: 70 IN | BODY MASS INDEX: 40.66 KG/M2 | HEART RATE: 86 BPM | OXYGEN SATURATION: 96 %

## 2017-08-25 DIAGNOSIS — F41.9 ANXIETY: Primary | ICD-10-CM

## 2017-08-25 PROCEDURE — 99212 OFFICE O/P EST SF 10 MIN: CPT | Performed by: INTERNAL MEDICINE

## 2017-08-25 RX ORDER — ESCITALOPRAM OXALATE 5 MG/1
5 TABLET ORAL DAILY
Qty: 90 TABLET | Refills: 3 | Status: SHIPPED | OUTPATIENT
Start: 2017-08-25 | End: 2018-08-13 | Stop reason: SDUPTHER

## 2017-08-25 NOTE — PROGRESS NOTES
Subjective     James Mckeon is a 72 y.o. male who presents with   Chief Complaint   Patient presents with   • Anxiety       History of Present Illness     Anxiety.  Patient is doing well with Lexapro.  He wants to continue.      D/w patient coming in for annual AWV.  He didn't think he needed to come in since he sees endo and cardiology.      Review of Systems    The following portions of the patient's history were reviewed and updated as appropriate: allergies, current medications and problem list.    Patient Active Problem List    Diagnosis Date Noted   • Vitamin D deficiency 02/16/2017   • Uncontrolled type 2 diabetes mellitus 05/26/2016   • Coronary arteriosclerosis in native artery 03/29/2016   • Anxiety 02/25/2016   • Asymptomatic coronary heart disease 02/25/2016   • Benign essential hypertension 02/25/2016   • Benign prostatic hyperplasia 02/25/2016   • Chronic kidney disease, stage III (moderate) 02/25/2016   • Diabetes mellitus 02/25/2016     Note Last Updated: 7/6/2017     Description: Managed by Dr. Ross  Eye care with [facility].     • Hyperlipidemia 02/25/2016   • Knee pain 02/25/2016   • Testosterone deficiency 02/25/2016   • Obstructive sleep apnea syndrome 02/25/2016   • Renal insufficiency 05/14/2013   • History of coronary artery bypass surgery 05/14/2013   • Dyslipidemia 05/14/2013       Current Outpatient Prescriptions on File Prior to Visit   Medication Sig Dispense Refill   • aspirin 81 MG EC tablet Take 81 mg by mouth daily.     • AXIRON 30 MG/ACT solution 2 pump actuation under each arm 180 mL 5   • Calcium Carbonate-Vitamin D (CALCIUM-VITAMIN D) 500-200 MG-UNIT tablet per tablet Take  by mouth daily.     • fenofibrate (TRICOR) 145 MG tablet Take 1 tablet by mouth Daily. 30 tablet 5   • furosemide (LASIX) 40 MG tablet Take 40 mg by mouth daily.     • glucose blood test strip Test 4 times daily 200 each 3   • Insulin Pen Needle 31G X 8 MM misc Use to inject 2 times daily 100  "each 5   • losartan (COZAAR) 25 MG tablet Take 25 mg by mouth daily.     • LUMIGAN 0.01 % ophthalmic drops INSTILL 1 DROP IN OU QHS  5   • metoprolol succinate XL (TOPROL-XL) 50 MG 24 hr tablet Take 1.5 tablets by mouth every 12 (twelve) hours.     • nateglinide (STARLIX) 120 MG tablet Take 1 tablet by mouth 3 (Three) Times a Day Before Meals. 90 tablet 5   • potassium chloride (MICRO-K) 10 MEQ CR capsule Take 10 mEq by mouth daily.     • pramipexole (MIRAPEX) 0.25 MG tablet Take 0.25 mg by mouth daily.     • rosuvastatin (CRESTOR) 20 MG tablet Take 20 mg by mouth daily.     • TOUJEO SOLOSTAR 300 UNIT/ML solution pen-injector Inject 75 Units under the skin Daily. 6 pen 5   • VASCEPA 1 G capsule capsule Take 2 g by mouth 2 (Two) Times a Day With Meals. 120 capsule 5   • VICTOZA 18 MG/3ML solution pen-injector Inject 1.8 mg under the skin Daily. 3 pen 5   • [DISCONTINUED] escitalopram (LEXAPRO) 5 MG tablet TAKE 1 TABLET BY MOUTH EVERY DAY 30 tablet 0   • [DISCONTINUED] sildenafil (VIAGRA) 50 MG tablet Take 50 mg by mouth As Needed.       No current facility-administered medications on file prior to visit.        Objective     /86  Pulse 86  Ht 70\" (177.8 cm)  Wt 284 lb (129 kg)  SpO2 96%  BMI 40.75 kg/m2    Physical Exam   Constitutional: He is oriented to person, place, and time. He appears well-developed and well-nourished.   HENT:   Head: Atraumatic.   Neurological: He is alert and oriented to person, place, and time.   Psychiatric: He has a normal mood and affect.       Assessment/Plan   James was seen today for anxiety.    Diagnoses and all orders for this visit:    Anxiety    Other orders  -     escitalopram (LEXAPRO) 5 MG tablet; Take 1 tablet by mouth Daily.        Discussion  Anxiety.  Refill of Lexapro.    Schedule AWV before end of year.         Future Appointments  Date Time Provider Department Center   11/1/2017 8:30 AM [facility] None   11/7/2017 10:00 AM Radha ELDER" MD [facility] None   11/20/2017 10:20 AM [facility] None   12/4/2017 10:20 AM [facility] None   2/23/2018 9:00 AM [facility] PROVIDER SCHEDULE [facility]

## 2017-09-19 RX ORDER — FENOFIBRATE 145 MG/1
TABLET, COATED ORAL
Qty: 30 TABLET | Refills: 0 | Status: ON HOLD | OUTPATIENT
Start: 2017-09-19 | End: 2018-07-30 | Stop reason: SDUPTHER

## 2017-11-01 DIAGNOSIS — IMO0002 UNCONTROLLED TYPE 2 DIABETES MELLITUS WITH OTHER SPECIFIED COMPLICATION, WITH LONG-TERM CURRENT USE OF INSULIN: Primary | ICD-10-CM

## 2017-11-01 DIAGNOSIS — N40.0 BENIGN PROSTATIC HYPERPLASIA WITHOUT LOWER URINARY TRACT SYMPTOMS: ICD-10-CM

## 2017-11-02 LAB
ALBUMIN SERPL-MCNC: 4.1 G/DL (ref 3.5–4.8)
ALBUMIN/CREAT UR: 34.1 MG/G CREAT (ref 0–30)
ALBUMIN/GLOB SERPL: 1.4 {RATIO} (ref 1.2–2.2)
ALP SERPL-CCNC: 40 IU/L (ref 39–117)
ALT SERPL-CCNC: 28 IU/L (ref 0–44)
APPEARANCE UR: CLEAR
AST SERPL-CCNC: 20 IU/L (ref 0–40)
BACTERIA #/AREA URNS HPF: NORMAL /[HPF]
BASOPHILS # BLD AUTO: 0.1 X10E3/UL (ref 0–0.2)
BASOPHILS NFR BLD AUTO: 1 %
BILIRUB SERPL-MCNC: 0.3 MG/DL (ref 0–1.2)
BILIRUB UR QL STRIP: NEGATIVE
BUN SERPL-MCNC: 30 MG/DL (ref 8–27)
BUN/CREAT SERPL: 16 (ref 10–24)
CALCIUM SERPL-MCNC: 9.4 MG/DL (ref 8.6–10.2)
CHLORIDE SERPL-SCNC: 100 MMOL/L (ref 96–106)
CHOLEST SERPL-MCNC: 145 MG/DL (ref 100–199)
CO2 SERPL-SCNC: 27 MMOL/L (ref 18–29)
COLOR UR: YELLOW
CREAT SERPL-MCNC: 1.82 MG/DL (ref 0.76–1.27)
CREAT UR-MCNC: 65.6 MG/DL
EOSINOPHIL # BLD AUTO: 0.3 X10E3/UL (ref 0–0.4)
EOSINOPHIL NFR BLD AUTO: 3 %
EPI CELLS #/AREA URNS HPF: NORMAL /HPF
ERYTHROCYTE [DISTWIDTH] IN BLOOD BY AUTOMATED COUNT: 16.2 % (ref 12.3–15.4)
GFR SERPLBLD CREATININE-BSD FMLA CKD-EPI: 37 ML/MIN/1.73
GFR SERPLBLD CREATININE-BSD FMLA CKD-EPI: 43 ML/MIN/1.73
GLOBULIN SER CALC-MCNC: 2.9 G/DL (ref 1.5–4.5)
GLUCOSE SERPL-MCNC: 139 MG/DL (ref 65–99)
GLUCOSE UR QL: NEGATIVE
HBA1C MFR BLD: 8.1 % (ref 4.8–5.6)
HCT VFR BLD AUTO: 49.7 % (ref 37.5–51)
HDLC SERPL-MCNC: 39 MG/DL
HGB BLD-MCNC: 15.7 G/DL (ref 12.6–17.7)
HGB UR QL STRIP: NEGATIVE
IMM GRANULOCYTES # BLD: 0 X10E3/UL (ref 0–0.1)
IMM GRANULOCYTES NFR BLD: 0 %
KETONES UR QL STRIP: NEGATIVE
LDLC SERPL CALC-MCNC: 68 MG/DL (ref 0–99)
LEUKOCYTE ESTERASE UR QL STRIP: NEGATIVE
LYMPHOCYTES # BLD AUTO: 1.8 X10E3/UL (ref 0.7–3.1)
LYMPHOCYTES NFR BLD AUTO: 20 %
MCH RBC QN AUTO: 24.9 PG (ref 26.6–33)
MCHC RBC AUTO-ENTMCNC: 31.6 G/DL (ref 31.5–35.7)
MCV RBC AUTO: 79 FL (ref 79–97)
MICRO URNS: (no result)
MICRO URNS: (no result)
MICROALBUMIN UR-MCNC: 22.4 UG/ML
MONOCYTES # BLD AUTO: 0.7 X10E3/UL (ref 0.1–0.9)
MONOCYTES NFR BLD AUTO: 7 %
NEUTROPHILS # BLD AUTO: 6.2 X10E3/UL (ref 1.4–7)
NEUTROPHILS NFR BLD AUTO: 69 %
NITRITE UR QL STRIP: NEGATIVE
NTI URINE TUBE (GRAY): NORMAL
PH UR STRIP: 5.5 [PH] (ref 5–7.5)
PLATELET # BLD AUTO: 193 X10E3/UL (ref 150–379)
POTASSIUM SERPL-SCNC: 4.9 MMOL/L (ref 3.5–5.2)
PROT SERPL-MCNC: 7 G/DL (ref 6–8.5)
PROT UR QL STRIP: NEGATIVE
PSA SERPL-MCNC: 1.7 NG/ML (ref 0–4)
RBC # BLD AUTO: 6.3 X10E6/UL (ref 4.14–5.8)
RBC #/AREA URNS HPF: NORMAL /HPF
SODIUM SERPL-SCNC: 142 MMOL/L (ref 134–144)
SP GR UR: 1.01 (ref 1–1.03)
TRIGL SERPL-MCNC: 192 MG/DL (ref 0–149)
TSH SERPL DL<=0.005 MIU/L-ACNC: 2.13 UIU/ML (ref 0.45–4.5)
UROBILINOGEN UR STRIP-MCNC: 0.2 MG/DL (ref 0.2–1)
VLDLC SERPL CALC-MCNC: 38 MG/DL (ref 5–40)
WBC # BLD AUTO: 9 X10E3/UL (ref 3.4–10.8)
WBC #/AREA URNS HPF: NORMAL /HPF

## 2017-11-07 ENCOUNTER — OFFICE VISIT (OUTPATIENT)
Dept: INTERNAL MEDICINE | Facility: CLINIC | Age: 72
End: 2017-11-07

## 2017-11-07 VITALS
RESPIRATION RATE: 18 BRPM | DIASTOLIC BLOOD PRESSURE: 86 MMHG | SYSTOLIC BLOOD PRESSURE: 128 MMHG | OXYGEN SATURATION: 98 % | BODY MASS INDEX: 40.66 KG/M2 | WEIGHT: 284 LBS | HEIGHT: 70 IN | HEART RATE: 85 BPM

## 2017-11-07 DIAGNOSIS — Z00.00 MEDICARE ANNUAL WELLNESS VISIT, SUBSEQUENT: Primary | ICD-10-CM

## 2017-11-07 DIAGNOSIS — IMO0002 UNCONTROLLED TYPE 2 DIABETES MELLITUS WITH OTHER SPECIFIED COMPLICATION, WITH LONG-TERM CURRENT USE OF INSULIN: ICD-10-CM

## 2017-11-07 DIAGNOSIS — I10 BENIGN ESSENTIAL HYPERTENSION: ICD-10-CM

## 2017-11-07 DIAGNOSIS — E78.5 HYPERLIPIDEMIA, UNSPECIFIED HYPERLIPIDEMIA TYPE: ICD-10-CM

## 2017-11-07 DIAGNOSIS — N18.30 CHRONIC KIDNEY DISEASE, STAGE III (MODERATE) (HCC): ICD-10-CM

## 2017-11-07 DIAGNOSIS — F41.9 ANXIETY: ICD-10-CM

## 2017-11-07 DIAGNOSIS — Z86.010 HISTORY OF COLON POLYPS: ICD-10-CM

## 2017-11-07 DIAGNOSIS — N52.9 ERECTILE DYSFUNCTION, UNSPECIFIED ERECTILE DYSFUNCTION TYPE: ICD-10-CM

## 2017-11-07 DIAGNOSIS — Z13.6 ENCOUNTER FOR SCREENING FOR VASCULAR DISEASE: ICD-10-CM

## 2017-11-07 PROCEDURE — G0439 PPPS, SUBSEQ VISIT: HCPCS | Performed by: INTERNAL MEDICINE

## 2017-11-07 PROCEDURE — G0009 ADMIN PNEUMOCOCCAL VACCINE: HCPCS | Performed by: INTERNAL MEDICINE

## 2017-11-07 PROCEDURE — 99214 OFFICE O/P EST MOD 30 MIN: CPT | Performed by: INTERNAL MEDICINE

## 2017-11-07 PROCEDURE — 90670 PCV13 VACCINE IM: CPT | Performed by: INTERNAL MEDICINE

## 2017-11-07 RX ORDER — INFLUENZA A VIRUS A/MICHIGAN/45/2015 X-275 (H1N1) ANTIGEN (FORMALDEHYDE INACTIVATED), INFLUENZA A VIRUS A/SINGAPORE/INFIMH-16-0019/2016 IVR-186 (H3N2) ANTIGEN (FORMALDEHYDE INACTIVATED), AND INFLUENZA B VIRUS B/MARYLAND/15/2016 BX-69A (A B/COLORADO/6/2017-LIKE VIRUS) ANTIGEN (FORMALDEHYDE INACTIVATED) 60; 60; 60 UG/.5ML; UG/.5ML; UG/.5ML
INJECTION, SUSPENSION INTRAMUSCULAR
Refills: 0 | COMMUNITY
Start: 2017-09-14 | End: 2017-12-04

## 2017-11-07 NOTE — PATIENT INSTRUCTIONS
Medicare Wellness  Personal Prevention Plan of Service     Date of Office Visit:  2017  Encounter Provider:  Radha Haji MD  Place of Service:  [facility] INTERNAL MEDICINE  Patient Name: James Mckeon  :  1945    As part of the Medicare Wellness portion of your visit today, we are providing you with this personalized preventive plan of services (PPPS). This plan is based upon recommendations of the [place] Preventive Services Task Force (USPSTF) and the Advisory Committee on Immunization Practices (ACIP).    This lists the preventive care services that should be considered, and provides dates of when you are due. Items listed as completed are up-to-date and do not require any further intervention.    Health Maintenance   Topic Date Due   • TDAP/TD VACCINES (1 - Tdap) 1966   • PNEUMOCOCCAL VACCINES (65+ LOW/MEDIUM RISK) (1 of 2 - PCV13) 2012   • DIABETIC FOOT EXAM  2016   • AAA SCREEN (ONE-TIME)  2016   • DIABETIC EYE EXAM  2016   • MEDICARE ANNUAL WELLNESS  04/10/2017   • COLONOSCOPY  2017   • HEMOGLOBIN A1C  2018   • LIPID PANEL  2018   • URINE MICROALBUMIN  2018   • HEPATITIS C SCREENING  Addressed   • INFLUENZA VACCINE  Completed   • ZOSTER VACCINE  Completed       Orders Placed This Encounter   Procedures   • Pneumococcal Conjugate Vaccine 13-Valent All   • Ambulatory Referral to Urology     Referral Priority:   Routine     Referral Type:   Consultation     Referral Reason:   Specialty Services Required     Referred to Provider:   James Serrano MD     Requested Specialty:   Urology     Number of Visits Requested:   1   • Ambulatory Referral For Screening Colonoscopy     Referral Priority:   Routine     Referral Type:   Diagnostic Medical     Referral Reason:   Specialty Services Required     Referral Location:   [facility]     Referred to Provider:   Olimpia Blanc MD     Requested Specialty:    General Surgery     Number of Visits Requested:   1       Return in about 1 year (around 11/7/2018) for Annual physical.

## 2017-11-07 NOTE — PROGRESS NOTES
Subjective     James Mckeon is a 72 y.o. male who presents for an annual wellness visit as well as check up of dm-2, htn, hld, anxiety, CKD.        History of Present Illness      Dm-2.  Followed by endo. Reviewed regimen.  HTN. Control is good with current.    HLD.  Control is good with current regimen.  CKD.  Numbers are stable.   Anxiety.  Good with lexapro.      Review of Systems   Respiratory: Negative.    Cardiovascular: Negative.    Genitourinary:        ED.  He is on testosterone with endo.  States viagra no help in the past.  Seen by Dr. Serrano in the past.       The following portions of the patient's history were reviewed and updated as appropriate: allergies, current medications, past family history, past medical history, past social history, past surgical history and problem list.  Health maintenance tab was reviewed and updated with the patient.       Patient Active Problem List    Diagnosis Date Noted   • Vitamin D deficiency 02/16/2017   • Uncontrolled type 2 diabetes mellitus 05/26/2016   • Coronary arteriosclerosis in native artery 03/29/2016   • Anxiety 02/25/2016   • Asymptomatic coronary heart disease 02/25/2016   • Benign essential hypertension 02/25/2016   • Benign prostatic hyperplasia 02/25/2016   • Chronic kidney disease, stage III (moderate) 02/25/2016   • Hyperlipidemia 02/25/2016   • Testosterone deficiency 02/25/2016   • Obstructive sleep apnea syndrome 02/25/2016   • History of coronary artery bypass surgery 05/14/2013       Past Medical History:   Diagnosis Date   • Testosterone deficiency    • Type 2 diabetes mellitus        Past Surgical History:   Procedure Laterality Date   • COLECTOMY PARTIAL / TOTAL     • CORONARY ARTERY BYPASS GRAFT     • NEPHRECTOMY         Family History   Problem Relation Age of Onset   • Adopted: Yes       Social History     Social History   • Marital status:      Spouse name: N/A   • Number of children: N/A   • Years of education: N/A      Occupational History   • Not on file.     Social History Main Topics   • Smoking status: Former Smoker   • Smokeless tobacco: Not on file      Comment: 50 yrs ago   • Alcohol use Not on file   • Drug use: Not on file   • Sexual activity: Not on file     Other Topics Concern   • Not on file     Social History Narrative       Current Outpatient Prescriptions on File Prior to Visit   Medication Sig Dispense Refill   • aspirin 81 MG EC tablet Take 81 mg by mouth daily.     • AXIRON 30 MG/ACT solution 2 pump actuation under each arm 180 mL 5   • Calcium Carbonate-Vitamin D (CALCIUM-VITAMIN D) 500-200 MG-UNIT tablet per tablet Take  by mouth daily.     • escitalopram (LEXAPRO) 5 MG tablet Take 1 tablet by mouth Daily. 90 tablet 3   • fenofibrate (TRICOR) 145 MG tablet TAKE 1 TABLET BY MOUTH EVERY DAY 30 tablet 0   • furosemide (LASIX) 40 MG tablet Take 40 mg by mouth daily.     • glucose blood test strip Test 4 times daily 200 each 3   • Insulin Pen Needle 31G X 8 MM misc Use to inject 2 times daily 100 each 5   • losartan (COZAAR) 25 MG tablet Take 25 mg by mouth daily.     • LUMIGAN 0.01 % ophthalmic drops INSTILL 1 DROP IN OU QHS  5   • metoprolol succinate XL (TOPROL-XL) 50 MG 24 hr tablet Take 1.5 tablets by mouth every 12 (twelve) hours.     • nateglinide (STARLIX) 120 MG tablet Take 1 tablet by mouth 3 (Three) Times a Day Before Meals. 90 tablet 5   • potassium chloride (MICRO-K) 10 MEQ CR capsule Take 10 mEq by mouth daily.     • pramipexole (MIRAPEX) 0.25 MG tablet Take 0.25 mg by mouth daily.     • rosuvastatin (CRESTOR) 20 MG tablet Take 20 mg by mouth daily.     • TOUJEO SOLOSTAR 300 UNIT/ML solution pen-injector Inject 75 Units under the skin Daily. 6 pen 5   • VASCEPA 1 G capsule capsule Take 2 g by mouth 2 (Two) Times a Day With Meals. 120 capsule 5   • VICTOZA 18 MG/3ML solution pen-injector Inject 1.8 mg under the skin Daily. 3 pen 5   • [DISCONTINUED] fenofibrate (TRICOR) 145 MG tablet Take 1 tablet  "by mouth Daily. 30 tablet 5     No current facility-administered medications on file prior to visit.        No Known Allergies    Immunization History   Administered Date(s) Administered   • Flu Vaccine High Dose PF 65YR+ 10/04/2016, 09/14/2017   • Pneumococcal Conjugate 13-Valent 11/07/2017   • Tdap 01/01/2008       Objective     /86  Pulse 85  Resp 18  Ht 70\" (177.8 cm)  Wt 284 lb (129 kg)  SpO2 98%  BMI 40.75 kg/m2    Physical Exam   Constitutional: He is oriented to person, place, and time. He appears well-developed and well-nourished.   HENT:   Head: Normocephalic and atraumatic.   Right Ear: Hearing and tympanic membrane normal.   Left Ear: Hearing and tympanic membrane normal.   Mouth/Throat: No posterior oropharyngeal erythema.   Neck: Neck supple. No thyromegaly present.   Cardiovascular: Normal rate, regular rhythm and normal heart sounds.    No murmur heard.  Pulmonary/Chest: Effort normal and breath sounds normal.   Abdominal: Soft. He exhibits no distension. There is no hepatosplenomegaly. There is no tenderness.    James had a diabetic foot exam performed today.   During the foot exam he had a monofilament test performed (normal throughout).   Skin Integrity  -  His right foot skin is intact.     James 's left foot skin is intact. .   Foot Structure and Biomechanics -  His right foot has no hallux valgus and no hammer toes present. His left foot has no hallux valgus and no hammer toes.      Lymphadenopathy:     He has no cervical adenopathy.   Neurological: He is alert and oriented to person, place, and time.   Skin: Skin is warm and dry.   Psychiatric: He has a normal mood and affect. His speech is normal and behavior is normal. Judgment and thought content normal. Cognition and memory are normal.       Assessment/Plan   James was seen today for annual exam.    Diagnoses and all orders for this visit:    Medicare annual wellness visit, subsequent    Uncontrolled type 2 diabetes mellitus " with other specified complication, with long-term current use of insulin    Benign essential hypertension    Hyperlipidemia, unspecified hyperlipidemia type    Encounter for screening for vascular disease  -     Vascular Screening (Bundle) CAR; Future    Erectile dysfunction, unspecified erectile dysfunction type  -     Ambulatory Referral to Urology    History of colon polyps  -     Ambulatory Referral For Screening Colonoscopy    Chronic kidney disease, stage III (moderate)    Anxiety    Other orders  -     Pneumococcal Conjugate Vaccine 13-Valent All        Discussion    AWV.  See scanned forms for airam history, PHQ-9, functional ability questionnaire, cognitive impairment screening.  Direct observation of cognitive abilities:  The patient does not exhibit  any impairment in cognitive abilities upon direct observation at today's visit.   These were all reviewed with the patient and the patient was provided with a personal prevention plan of service in patient instructions.  Patient was given advice or handouts on the following topics:  nutrition, exercise, weight management   DM-2.  Continue with endo.  HTN.  Continue current regimen.  HLD. Continue current regimen.  CKD.  Continue NSAID avoidance and plenty of water.   Anxiety.  Continue lexapro.       Health Maintenance   Topic Date Due   • TDAP/TD VACCINES (1 - Tdap) 02/26/1966   • PNEUMOCOCCAL VACCINES (65+ LOW/MEDIUM RISK) (1 of 2 - PCV13) 02/26/2012   • DIABETIC FOOT EXAM  02/25/2016   • AAA SCREEN (ONE-TIME)  02/25/2016   • DIABETIC EYE EXAM  11/04/2016   • MEDICARE ANNUAL WELLNESS  04/10/2017   • COLONOSCOPY  11/14/2017   • HEMOGLOBIN A1C  05/01/2018   • LIPID PANEL  11/01/2018   • URINE MICROALBUMIN  11/01/2018   • HEPATITIS C SCREENING  Addressed   • INFLUENZA VACCINE  Completed   • ZOSTER VACCINE  Completed            Future Appointments  Date Time Provider Department Center   11/20/2017 10:20 AM [facility] None   12/4/2017 10:20  AM [facility] None   2/23/2018 9:00 AM [facility] None   11/6/2018 8:40 AM [facility] None   11/13/2018 10:00 AM MD [facility] None

## 2017-11-13 DIAGNOSIS — E34.9 TESTOSTERONE DEFICIENCY: ICD-10-CM

## 2017-11-13 DIAGNOSIS — N40.0 BENIGN PROSTATIC HYPERPLASIA WITHOUT LOWER URINARY TRACT SYMPTOMS: ICD-10-CM

## 2017-11-13 DIAGNOSIS — E55.9 VITAMIN D DEFICIENCY: ICD-10-CM

## 2017-11-13 DIAGNOSIS — IMO0002 UNCONTROLLED TYPE 2 DIABETES MELLITUS WITH OTHER SPECIFIED COMPLICATION, WITH LONG-TERM CURRENT USE OF INSULIN: Primary | ICD-10-CM

## 2017-12-04 ENCOUNTER — OFFICE VISIT (OUTPATIENT)
Dept: ENDOCRINOLOGY | Age: 72
End: 2017-12-04

## 2017-12-04 VITALS
HEIGHT: 70 IN | WEIGHT: 281 LBS | BODY MASS INDEX: 40.23 KG/M2 | SYSTOLIC BLOOD PRESSURE: 142 MMHG | DIASTOLIC BLOOD PRESSURE: 88 MMHG

## 2017-12-04 DIAGNOSIS — E55.9 VITAMIN D DEFICIENCY: ICD-10-CM

## 2017-12-04 DIAGNOSIS — I10 BENIGN ESSENTIAL HYPERTENSION: ICD-10-CM

## 2017-12-04 DIAGNOSIS — E34.9 TESTOSTERONE DEFICIENCY: ICD-10-CM

## 2017-12-04 DIAGNOSIS — IMO0002 UNCONTROLLED TYPE 2 DIABETES MELLITUS WITH OTHER SPECIFIED COMPLICATION, WITH LONG-TERM CURRENT USE OF INSULIN: Primary | ICD-10-CM

## 2017-12-04 DIAGNOSIS — E78.5 HYPERLIPIDEMIA, UNSPECIFIED HYPERLIPIDEMIA TYPE: ICD-10-CM

## 2017-12-04 PROCEDURE — 99214 OFFICE O/P EST MOD 30 MIN: CPT | Performed by: NURSE PRACTITIONER

## 2017-12-04 RX ORDER — NATEGLINIDE 120 MG/1
120 TABLET ORAL
Qty: 90 TABLET | Refills: 5 | Status: SHIPPED | OUTPATIENT
Start: 2017-12-04 | End: 2017-12-12 | Stop reason: SDUPTHER

## 2017-12-04 NOTE — PATIENT INSTRUCTIONS
floraster probiotic once daily for diarrhea  Continue current meds  Call blood sugars to scott in office if they continue to stay high  Take starlix prior to each meal 3 times daily

## 2017-12-04 NOTE — PROGRESS NOTES
"Subjective   James Mckeon is a 72 y.o. male is here today for follow-up.  Chief Complaint   Patient presents with   • Diabetes     recent labs, pt test BG 1x daily, pt did not bring meter   • Hyperlipidemia     pt is not taking vascepa, has trouble swallowing them   • Hypertension     pt believes victoza is causing him to have diarrhea   • renal insufficiency   • testostreone deficiency     /88  Ht 70\" (177.8 cm)  Wt 281 lb (127 kg)  BMI 40.32 kg/m2  Current Outpatient Prescriptions on File Prior to Visit   Medication Sig   • aspirin 81 MG EC tablet Take 81 mg by mouth daily.   • Calcium Carbonate-Vitamin D (CALCIUM-VITAMIN D) 500-200 MG-UNIT tablet per tablet Take  by mouth daily.   • escitalopram (LEXAPRO) 5 MG tablet Take 1 tablet by mouth Daily.   • fenofibrate (TRICOR) 145 MG tablet TAKE 1 TABLET BY MOUTH EVERY DAY   • furosemide (LASIX) 40 MG tablet Take 40 mg by mouth daily.   • glucose blood test strip Test 4 times daily   • Insulin Pen Needle 31G X 8 MM misc Use to inject 2 times daily   • losartan (COZAAR) 25 MG tablet Take 25 mg by mouth daily.   • LUMIGAN 0.01 % ophthalmic drops INSTILL 1 DROP IN OU QHS   • metoprolol succinate XL (TOPROL-XL) 50 MG 24 hr tablet Take 1.5 tablets by mouth every 12 (twelve) hours.   • potassium chloride (MICRO-K) 10 MEQ CR capsule Take 10 mEq by mouth daily.   • pramipexole (MIRAPEX) 0.25 MG tablet Take 0.25 mg by mouth daily.   • rosuvastatin (CRESTOR) 20 MG tablet Take 20 mg by mouth daily.   • TOUJEO SOLOSTAR 300 UNIT/ML solution pen-injector Inject 75 Units under the skin Daily.   • VICTOZA 18 MG/3ML solution pen-injector Inject 1.8 mg under the skin Daily.   • [DISCONTINUED] AXIRON 30 MG/ACT solution 2 pump actuation under each arm   • [DISCONTINUED] nateglinide (STARLIX) 120 MG tablet Take 1 tablet by mouth 3 (Three) Times a Day Before Meals. (Patient taking differently: Take 120 mg by mouth 2 (Two) Times a Day Before Meals.)   • VASCEPA 1 G capsule " capsule Take 2 g by mouth 2 (Two) Times a Day With Meals.   • [DISCONTINUED] FLUZONE HIGH-DOSE 0.5 ML suspension prefilled syringe injection ADM 0.5ML IM UTD     No current facility-administered medications on file prior to visit.      Family History   Problem Relation Age of Onset   • Adopted: Yes     Social History   Substance Use Topics   • Smoking status: Former Smoker   • Smokeless tobacco: None      Comment: 50 yrs ago   • Alcohol use None     No Known Allergies      History of Present Illness  Encounter Diagnoses   Name Primary?   • Uncontrolled type 2 diabetes mellitus with other specified complication, with long-term current use of insulin Yes   • Testosterone deficiency    • Vitamin D deficiency    • Hyperlipidemia, unspecified hyperlipidemia type    • Benign essential hypertension      72-year-old male patient here today for routine follow-up visit.  He has been seen for the above-mentioned problems.  He had recent labs which were reviewed.  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  His blood pressure is slightly elevated at today's visit.  He states he does take his blood pressure medications as prescribed.  He is requesting non-generic testosterone because he states it does not work as well.  He did not bring his blood glucose meter in today's visit.  He was given a new meter today.  He is only been taking his Starlix twice daily instead of 3 times daily as prescribed.  He does not want to change or add any other medications at today's visit.  His hemoglobin A1c has gone from good control to an average that is much higher.  He does have problems with diarrhea and thinks this might be related to the Victoza.  We discussed taking probiotics to help his gut  The following portions of the patient's history were reviewed and updated as appropriate: allergies, current medications, past family history, past medical history, past social history, past surgical history and problem  list.    Review of Systems   Constitutional: Negative for fatigue.   HENT: Negative for trouble swallowing.    Eyes: Negative for visual disturbance.   Respiratory: Negative for shortness of breath.    Cardiovascular: Negative for leg swelling.   Endocrine: Negative for polyuria.   Skin: Negative for wound.   Neurological: Negative for numbness.       Objective   Physical Exam   Constitutional: He is oriented to person, place, and time. He appears well-developed and well-nourished. No distress.   HENT:   Head: Normocephalic and atraumatic.   Right Ear: External ear normal.   Left Ear: External ear normal.   Nose: Nose normal.   Eyes: Pupils are equal, round, and reactive to light. Right eye exhibits no discharge. Left eye exhibits no discharge.   Neck: Normal range of motion. Neck supple. Carotid bruit is not present. No tracheal deviation, no edema and no erythema present. No thyromegaly present.   Cardiovascular: Normal rate, regular rhythm, normal heart sounds and intact distal pulses.  Exam reveals no gallop and no friction rub.    No murmur heard.  Pulmonary/Chest: Effort normal and breath sounds normal. No respiratory distress. He has no wheezes. He has no rales.   Abdominal: He exhibits no distension. There is no tenderness.   Musculoskeletal: Normal range of motion. He exhibits edema. He exhibits no deformity.   Trace edema in left lower ext   Lymphadenopathy:     He has no cervical adenopathy.   Neurological: He is alert and oriented to person, place, and time. Coordination normal.   Skin: Skin is warm and dry. No rash noted. He is not diaphoretic. No erythema. No pallor.   Psychiatric: He has a normal mood and affect. His behavior is normal. Judgment and thought content normal.   Nursing note and vitals reviewed.    Results for orders placed or performed in visit on 11/01/17   Comprehensive Metabolic Panel   Result Value Ref Range    Glucose 139 (H) 65 - 99 mg/dL    BUN 30 (H) 8 - 27 mg/dL    Creatinine  1.82 (H) 0.76 - 1.27 mg/dL    eGFR Non African Am 37 (L) >59 mL/min/1.73    eGFR  Am 43 (L) >59 mL/min/1.73    BUN/Creatinine Ratio 16 10 - 24    Sodium 142 134 - 144 mmol/L    Potassium 4.9 3.5 - 5.2 mmol/L    Chloride 100 96 - 106 mmol/L    Total CO2 27 18 - 29 mmol/L    Calcium 9.4 8.6 - 10.2 mg/dL    Total Protein 7.0 6.0 - 8.5 g/dL    Albumin 4.1 3.5 - 4.8 g/dL    Globulin 2.9 1.5 - 4.5 g/dL    A/G Ratio 1.4 1.2 - 2.2    Total Bilirubin 0.3 0.0 - 1.2 mg/dL    Alkaline Phosphatase 40 39 - 117 IU/L    AST (SGOT) 20 0 - 40 IU/L    ALT (SGPT) 28 0 - 44 IU/L   Lipid Panel   Result Value Ref Range    Total Cholesterol 145 100 - 199 mg/dL    Triglycerides 192 (H) 0 - 149 mg/dL    HDL Cholesterol 39 (L) >39 mg/dL    VLDL Cholesterol 38 5 - 40 mg/dL    LDL Cholesterol  68 0 - 99 mg/dL   TSH Rfx On Abnormal To Free T4   Result Value Ref Range    TSH 2.130 0.450 - 4.500 uIU/mL   Hemoglobin A1c   Result Value Ref Range    Hemoglobin A1C 8.1 (H) 4.8 - 5.6 %   Microalbumin / Creatinine Urine Ratio - Urine, Clean Catch   Result Value Ref Range    Creatinine, Urine 65.6 Not Estab. mg/dL    Microalbumin, Urine 22.4 Not Estab. ug/mL    Microalbumin/Creatinine Ratio 34.1 (H) 0.0 - 30.0 mg/g creat   PSA   Result Value Ref Range    PSA 1.7 0.0 - 4.0 ng/mL   Microscopic Examination   Result Value Ref Range    WBC, UA 0-5 0 - 5 /hpf    RBC, UA None seen 0 - 2 /hpf    Epithelial Cells (non renal) 0-10 0 - 10 /hpf    Bacteria, UA None seen None seen/Few   NTI Urine Tube (Gray)   Result Value Ref Range    NTI Urine Tube (Gray) Comment    CBC & Differential   Result Value Ref Range    WBC 9.0 3.4 - 10.8 x10E3/uL    RBC 6.30 (H) 4.14 - 5.80 x10E6/uL    Hemoglobin 15.7 12.6 - 17.7 g/dL    Hematocrit 49.7 37.5 - 51.0 %    MCV 79 79 - 97 fL    MCH 24.9 (L) 26.6 - 33.0 pg    MCHC 31.6 31.5 - 35.7 g/dL    RDW 16.2 (H) 12.3 - 15.4 %    Platelets 193 150 - 379 x10E3/uL    Neutrophil Rel % 69 Not Estab. %    Lymphocyte Rel % 20 Not Estab.  %    Monocyte Rel % 7 Not Estab. %    Eosinophil Rel % 3 Not Estab. %    Basophil Rel % 1 Not Estab. %    Neutrophils Absolute 6.2 1.4 - 7.0 x10E3/uL    Lymphocytes Absolute 1.8 0.7 - 3.1 x10E3/uL    Monocytes Absolute 0.7 0.1 - 0.9 x10E3/uL    Eosinophils Absolute 0.3 0.0 - 0.4 x10E3/uL    Basophils Absolute 0.1 0.0 - 0.2 x10E3/uL    Immature Granulocyte Rel % 0 Not Estab. %    Immature Grans Absolute 0.0 0.0 - 0.1 x10E3/uL   Urinalysis With Microscopic - Urine, Clean Catch   Result Value Ref Range    Specific Gravity, UA 1.015 1.005 - 1.030    pH, UA 5.5 5.0 - 7.5    Color, UA Yellow Yellow    Appearance, UA Clear Clear    Leukocytes, UA Negative Negative    Protein Negative Negative/Trace    Glucose, UA Negative Negative    Ketones Negative Negative    Blood, UA Negative Negative    Bilirubin, UA Negative Negative    Urobilinogen, UA 0.2 0.2 - 1.0 mg/dL    Nitrite, UA Negative Negative    Microscopic Examination Comment     MICROSCOPIC EXAMINATION See below:          Assessment/Plan   Problems Addressed this Visit        Cardiovascular and Mediastinum    Benign essential hypertension    Hyperlipidemia       Digestive    Vitamin D deficiency       Endocrine    Testosterone deficiency    Uncontrolled type 2 diabetes mellitus - Primary    Relevant Medications    nateglinide (STARLIX) 120 MG tablet        In summary, patient was seen and examined.  His diabetes is no longer under adequate control.  He does not want to change his medications and instead wants to wait to his next visit to see if he continues to stay elevated.  He does not want to stop the Victoza even though he thinks this is what is causing his diarrhea.  Instead of talking him to get a probiotic to take to see if this helps.  I've asked that he contact the office if his blood sugars continue to stay high.  He is to continue all his other current medications as prescribed.  MAGDALENA Mendoza was reviewed at today's visit.  He was given a written prescription  for Axiron to take as directed.  He will follow-up in 4 months with labs prior.  I've encouraged him to contact the office should any questions or concerns prior to then.  He has stopped that vascepa because he states cannot swallow the pills.  We discussed getting over-the-counter fish oil to see if this is a better option.      floraster probiotic once daily for diarrhea  Continue current meds  Call blood sugars to scott in office if they continue to stay high  Take starlix prior to each meal 3 times daily

## 2017-12-11 RX ORDER — LIRAGLUTIDE 6 MG/ML
INJECTION SUBCUTANEOUS
Qty: 9 ML | Refills: 0 | Status: CANCELLED | OUTPATIENT
Start: 2017-12-11

## 2017-12-12 RX ORDER — NATEGLINIDE 120 MG/1
120 TABLET ORAL
Qty: 90 TABLET | Refills: 1 | Status: SHIPPED | OUTPATIENT
Start: 2017-12-12 | End: 2018-12-12

## 2017-12-12 RX ORDER — LIRAGLUTIDE 6 MG/ML
1.8 INJECTION SUBCUTANEOUS DAILY
Qty: 3 PEN | Refills: 5 | Status: SHIPPED | OUTPATIENT
Start: 2017-12-12 | End: 2018-05-15 | Stop reason: SDUPTHER

## 2017-12-12 RX ORDER — INSULIN GLARGINE 300 U/ML
75 INJECTION, SOLUTION SUBCUTANEOUS DAILY
Qty: 6 PEN | Refills: 5 | Status: SHIPPED | OUTPATIENT
Start: 2017-12-12 | End: 2018-05-15 | Stop reason: SDUPTHER

## 2017-12-13 ENCOUNTER — RESULTS ENCOUNTER (OUTPATIENT)
Dept: ENDOCRINOLOGY | Age: 72
End: 2017-12-13

## 2017-12-13 DIAGNOSIS — IMO0002 UNCONTROLLED TYPE 2 DIABETES MELLITUS WITH OTHER SPECIFIED COMPLICATION, WITH LONG-TERM CURRENT USE OF INSULIN: ICD-10-CM

## 2017-12-13 DIAGNOSIS — N40.0 BENIGN PROSTATIC HYPERPLASIA WITHOUT LOWER URINARY TRACT SYMPTOMS: ICD-10-CM

## 2017-12-13 DIAGNOSIS — E55.9 VITAMIN D DEFICIENCY: ICD-10-CM

## 2017-12-13 DIAGNOSIS — E34.9 TESTOSTERONE DEFICIENCY: ICD-10-CM

## 2017-12-20 ENCOUNTER — PREP FOR SURGERY (OUTPATIENT)
Dept: OTHER | Facility: HOSPITAL | Age: 72
End: 2017-12-20

## 2017-12-20 DIAGNOSIS — Z85.038 HISTORY OF COLON CANCER: ICD-10-CM

## 2017-12-20 DIAGNOSIS — Z86.010 HISTORY OF COLON POLYPS: ICD-10-CM

## 2017-12-20 DIAGNOSIS — Z12.11 COLON CANCER SCREENING: Primary | ICD-10-CM

## 2018-01-29 ENCOUNTER — ANESTHESIA EVENT (OUTPATIENT)
Dept: GASTROENTEROLOGY | Facility: HOSPITAL | Age: 73
End: 2018-01-29

## 2018-01-29 ENCOUNTER — ANESTHESIA (OUTPATIENT)
Dept: GASTROENTEROLOGY | Facility: HOSPITAL | Age: 73
End: 2018-01-29

## 2018-01-29 ENCOUNTER — HOSPITAL ENCOUNTER (OUTPATIENT)
Facility: HOSPITAL | Age: 73
Setting detail: HOSPITAL OUTPATIENT SURGERY
Discharge: HOME | End: 2018-01-29
Attending: SURGERY | Admitting: SURGERY

## 2018-01-29 VITALS
WEIGHT: 264.87 LBS | BODY MASS INDEX: 39.23 KG/M2 | HEART RATE: 70 BPM | OXYGEN SATURATION: 98 % | HEIGHT: 69 IN | TEMPERATURE: 98.1 F | RESPIRATION RATE: 16 BRPM | DIASTOLIC BLOOD PRESSURE: 87 MMHG | SYSTOLIC BLOOD PRESSURE: 165 MMHG

## 2018-01-29 DIAGNOSIS — Z12.11 COLON CANCER SCREENING: ICD-10-CM

## 2018-01-29 DIAGNOSIS — Z86.010 HISTORY OF COLON POLYPS: ICD-10-CM

## 2018-01-29 DIAGNOSIS — Z85.038 HISTORY OF COLON CANCER: ICD-10-CM

## 2018-01-29 LAB
GLUCOSE BLDC GLUCOMTR-MCNC: 78 MG/DL (ref 70–130)
GLUCOSE BLDC GLUCOMTR-MCNC: 82 MG/DL (ref 70–130)

## 2018-01-29 PROCEDURE — 88305 TISSUE EXAM BY PATHOLOGIST: CPT | Performed by: SURGERY

## 2018-01-29 PROCEDURE — 45380 COLONOSCOPY AND BIOPSY: CPT | Performed by: SURGERY

## 2018-01-29 PROCEDURE — 82962 GLUCOSE BLOOD TEST: CPT

## 2018-01-29 PROCEDURE — 25010000002 PROPOFOL 10 MG/ML EMULSION: Performed by: ANESTHESIOLOGY

## 2018-01-29 PROCEDURE — 25010000002 GLUCAGON (RDNA) PER 1 MG: Performed by: SURGERY

## 2018-01-29 RX ORDER — LIDOCAINE HYDROCHLORIDE 10 MG/ML
0.5 INJECTION, SOLUTION INFILTRATION; PERINEURAL ONCE AS NEEDED
Status: DISCONTINUED | OUTPATIENT
Start: 2018-01-29 | End: 2018-01-29 | Stop reason: HOSPADM

## 2018-01-29 RX ORDER — PROPOFOL 10 MG/ML
VIAL (ML) INTRAVENOUS CONTINUOUS PRN
Status: DISCONTINUED | OUTPATIENT
Start: 2018-01-29 | End: 2018-01-29 | Stop reason: SURG

## 2018-01-29 RX ORDER — SODIUM CHLORIDE, SODIUM LACTATE, POTASSIUM CHLORIDE, CALCIUM CHLORIDE 600; 310; 30; 20 MG/100ML; MG/100ML; MG/100ML; MG/100ML
1000 INJECTION, SOLUTION INTRAVENOUS CONTINUOUS PRN
Status: DISCONTINUED | OUTPATIENT
Start: 2018-01-29 | End: 2018-01-29 | Stop reason: HOSPADM

## 2018-01-29 RX ORDER — PROPOFOL 10 MG/ML
VIAL (ML) INTRAVENOUS AS NEEDED
Status: DISCONTINUED | OUTPATIENT
Start: 2018-01-29 | End: 2018-01-29 | Stop reason: SURG

## 2018-01-29 RX ORDER — SODIUM CHLORIDE 0.9 % (FLUSH) 0.9 %
3 SYRINGE (ML) INJECTION AS NEEDED
Status: DISCONTINUED | OUTPATIENT
Start: 2018-01-29 | End: 2018-01-29 | Stop reason: HOSPADM

## 2018-01-29 RX ORDER — LIDOCAINE HYDROCHLORIDE 20 MG/ML
INJECTION, SOLUTION INFILTRATION; PERINEURAL AS NEEDED
Status: DISCONTINUED | OUTPATIENT
Start: 2018-01-29 | End: 2018-01-29 | Stop reason: SURG

## 2018-01-29 RX ADMIN — SODIUM CHLORIDE, POTASSIUM CHLORIDE, SODIUM LACTATE AND CALCIUM CHLORIDE: 600; 310; 30; 20 INJECTION, SOLUTION INTRAVENOUS at 08:40

## 2018-01-29 RX ADMIN — PROPOFOL 100 MCG/KG/MIN: 10 INJECTION, EMULSION INTRAVENOUS at 08:45

## 2018-01-29 RX ADMIN — PROPOFOL 100 MG: 10 INJECTION, EMULSION INTRAVENOUS at 08:45

## 2018-01-29 RX ADMIN — LIDOCAINE HYDROCHLORIDE 60 MG: 20 INJECTION, SOLUTION INFILTRATION; PERINEURAL at 08:45

## 2018-01-29 RX ADMIN — SODIUM CHLORIDE, POTASSIUM CHLORIDE, SODIUM LACTATE AND CALCIUM CHLORIDE 1000 ML: 600; 310; 30; 20 INJECTION, SOLUTION INTRAVENOUS at 08:03

## 2018-01-29 NOTE — ANESTHESIA PREPROCEDURE EVALUATION
Anesthesia Evaluation     Patient summary reviewed and Nursing notes reviewed          Airway   Mallampati: I  TM distance: <3 FB  Neck ROM: full  no difficulty expected  Dental - normal exam     Pulmonary - normal exam   (+) sleep apnea,   Cardiovascular - normal exam    (+) hypertension, CAD, CABG, hyperlipidemia      Neuro/Psych  (+) psychiatric history,     GI/Hepatic/Renal/Endo    (+)  diabetes mellitus,     Musculoskeletal (-) negative ROS    Abdominal  - normal exam    Bowel sounds: normal.   Substance History - negative use     OB/GYN negative ob/gyn ROS         Other                                                Anesthesia Plan    ASA 3     MAC     Anesthetic plan and risks discussed with patient.

## 2018-01-29 NOTE — ANESTHESIA POSTPROCEDURE EVALUATION
Patient: James Mckeon    Procedure Summary     Date Anesthesia Start Anesthesia Stop Room / Location    01/29/18 0837 [id number]  [facility] /  [facility]       Procedure Diagnosis Surgeon Provider    COLONOSCOPY TO ANASTOMOSIS WITH COLD BIOPSY POLYPECTOMY (N/A ) History of colon cancer; Colon cancer screening; History of colon polyps  (History of colon cancer [Z85.038]; Colon cancer screening [Z12.11]; History of colon polyps [Z86.010]) MD Edgar Solomon MD          Anesthesia Type: MAC  Last vitals  BP   165/87 (01/29/18 0921)   Temp   36.7 °C (98.1 °F) (01/29/18 0923)   Pulse   70 (01/29/18 0921)   Resp   16 (01/29/18 0921)     SpO2   98 % (01/29/18 0921)     Post Anesthesia Care and Evaluation    Patient location during evaluation: PACU  Patient participation: complete - patient participated  Level of consciousness: awake  Pain score: 1  Pain management: adequate  Airway patency: patent  Anesthetic complications: No anesthetic complications  PONV Status: none  Cardiovascular status: acceptable  Respiratory status: acceptable  Hydration status: acceptable

## 2018-01-30 ENCOUNTER — TELEPHONE (OUTPATIENT)
Dept: SURGERY | Facility: CLINIC | Age: 73
End: 2018-01-30

## 2018-01-30 LAB
CYTO UR: NORMAL
LAB AP CASE REPORT: NORMAL
Lab: NORMAL
PATH REPORT.FINAL DX SPEC: NORMAL
PATH REPORT.GROSS SPEC: NORMAL

## 2018-01-30 NOTE — TELEPHONE ENCOUNTER
----- Message from Olimpia Blanc MD sent at 1/30/2018  8:57 AM EST -----  Tali (endoscopy liaison),    Please call patient to inform them of these findings and recommendations and ensure that any pamphlets that were to be given to the patient at the hospital were received.     Please make sure a letter is sent to the patient, recall method entered into the computer and the HIM tab updated as far as need for endoscopy follow up.    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  · Anastomosis widely patent, with diverticula in the native colonic stump      Recommendations:   · C scope likely in 4 YEARS, BASED ON PRIOR POLYP AND THE PATHOLOGY OF THIS ONE.  · Diverticulosis pamphlet  · Copy of photographs to be given from today's procedure prior to discharge.

## 2018-02-23 ENCOUNTER — OFFICE VISIT (OUTPATIENT)
Dept: SLEEP MEDICINE | Facility: HOSPITAL | Age: 73
End: 2018-02-23
Attending: INTERNAL MEDICINE

## 2018-02-23 VITALS
SYSTOLIC BLOOD PRESSURE: 150 MMHG | DIASTOLIC BLOOD PRESSURE: 80 MMHG | BODY MASS INDEX: 39.37 KG/M2 | HEIGHT: 70 IN | HEART RATE: 80 BPM | WEIGHT: 275 LBS

## 2018-02-23 DIAGNOSIS — G47.61 PERIODIC LIMB MOVEMENT DISORDER (PLMD): ICD-10-CM

## 2018-02-23 DIAGNOSIS — G47.33 OBSTRUCTIVE SLEEP APNEA, ADULT: Primary | ICD-10-CM

## 2018-02-23 DIAGNOSIS — R68.2 DRY MOUTH: ICD-10-CM

## 2018-02-23 PROCEDURE — G0463 HOSPITAL OUTPT CLINIC VISIT: HCPCS

## 2018-02-23 NOTE — PROGRESS NOTES
[facility]  Telephone: [phone] / Fax: [phone] [place]  Telephone: [phone] / Fax: [phone] Marie Arnold    PCP: Radha Haji MD    Reason for visit: BRUCE f/u    James Mckeon was last seen at [facility] sleep lab 1 year ago. He uses the CPAP device and benefits from its use in terms of reduction of snoring and gasping respirations. Sleep quality has improved. RLS is controlled and he takes the mirapex at 8:30pm. He drinks ice tea(caffeinated) for dinner. DM and HTN are also controlled. He denies any RLS symptoms.    SH-no tobacco, 0 alcohol, 0 energy drinks, drinks 2 caffeine per day.    ROS- + nasal congestion, + post nasal gtt. Rest is negative.    Current Medications:    Current Outpatient Prescriptions:   •  aspirin 81 MG EC tablet, Take 81 mg by mouth daily., Disp: , Rfl:   •  AXIRON 30 MG/ACT solution, APPLY 2 PUMPS UNDER EACH ARM DAILY, Disp: 180 mL, Rfl: 0  •  Calcium Carbonate-Vitamin D (CALCIUM-VITAMIN D) 500-200 MG-UNIT tablet per tablet, Take 500 mg by mouth Daily., Disp: , Rfl:   •  escitalopram (LEXAPRO) 5 MG tablet, Take 1 tablet by mouth Daily., Disp: 90 tablet, Rfl: 3  •  fenofibrate (TRICOR) 145 MG tablet, TAKE 1 TABLET BY MOUTH EVERY DAY, Disp: 30 tablet, Rfl: 0  •  furosemide (LASIX) 40 MG tablet, Take 40 mg by mouth daily., Disp: , Rfl:   •  glucose blood test strip, Test 4 times daily, Disp: 200 each, Rfl: 3  •  Insulin Pen Needle 31G X 8 MM misc, Use to inject 2 times daily, Disp: 100 each, Rfl: 5  •  losartan (COZAAR) 25 MG tablet, Take 25 mg by mouth daily., Disp: , Rfl:   •  LUMIGAN 0.01 % ophthalmic drops, INSTILL 1 DROP IN OU QHS, Disp: , Rfl: 5  •  metoprolol succinate XL (TOPROL-XL) 50 MG 24 hr tablet, Take 1.5 tablets by mouth every 12 (twelve) hours., Disp: , Rfl:   •  nateglinide (STARLIX) 120 MG tablet, Take 1 tablet by mouth 3 (Three) Times a Day Before Meals., Disp: 90 tablet, Rfl: 1  •  potassium chloride (MICRO-K) 10 MEQ CR capsule,  "Take 10 mEq by mouth daily., Disp: , Rfl:   •  pramipexole (MIRAPEX) 0.25 MG tablet, Take 0.25 mg by mouth daily., Disp: , Rfl:   •  rosuvastatin (CRESTOR) 20 MG tablet, Take 20 mg by mouth daily., Disp: , Rfl:   •  TOUJEO SOLOSTAR 300 UNIT/ML solution pen-injector, Inject 75 Units under the skin Daily., Disp: 6 pen, Rfl: 5  •  VICTOZA 18 MG/3ML solution pen-injector injection, Inject 1.8 mg under the skin Daily., Disp: 3 pen, Rfl: 5   also entered in Sleep Questionnaire    Patient  has a past medical history of Colon polyps; Coronary artery disease; Hypertension; Sleep apnea; Testosterone deficiency; and Type 2 diabetes mellitus.    I have reviewed the Past Medical History, Past Surgical History, Social History and Family History.            ESS  3   Vital Signs Vitals:    02/23/18 0918   BP: 150/80   BP Location: Left arm   Patient Position: Sitting   Pulse: 80   Weight: 125 kg (275 lb)   Height: 176.5 cm (69.5\")    Body mass index is 40.03 kg/(m^2).    General Alert and oriented. No acute distress noted   Pharynx/Throat Class IV Mallampati airway, large tongue, no evidence of redundant lateral pharyngeal tissue. No oral lesions. No thrush. Moist mucous membranes..   Head Normocephalic. Symmetrical. Atraumatic.    Nose No septal deviation. No drainage   Chest Wall Normal shape. Symmetric expansion with respiration. No tenderness.   Neck Trachea midline, no thyromegaly or adenopathy    Lungs Clear to auscultation bilaterally. No wheezes. No rhonchi. No rales. Respirations regular, even and unlabored.   Heart Regular rhythm and normal rate. Normal S1 and S2. No murmur   Abdomen Soft, non-tender and non-distended. Normal bowel sounds. No masses.   Extremities Moves all extremities well. No edema   Psychiatric Normal mood and affect.     Testing:  · Download 1/22/18-2/20/18, 100% compliance with average nightly use of 7 hours and 44 min on auto CPAP 12-20cm with average nightly use of 12.3cm with AHI 0.9.    ·  " Titration 5/12/15 Titration study from 10:29 p.m. to 6:26 a.m. Sleep efficiency 81% with 6.48 hours of total sleep time. Sleep distribution showed decreased amounts of slow-wave sleep and REM sleep. The apnea-hypopnea index was decreased with application of positive airway pressure device. CPAP was increased all the way up to 20 cm. However persistent apneas-hypopneas noted even at this pressure setting. At this pressure setting the patient had some difficulty with exhalation. Due to inadequate treatment of the sleep disorder breathing with the highest CPAP pressure, a bilevel device and 24/20 had to be employed. The patient did achieve supine position sleep as well as REM sleep. Several PVCs were noted throughout the night. Arousal index was high at 35. The patient's PLM index was very high at 47 and PLM arousal index was also extremely high at 20.8.     · Diagnostic PSG Advanced Sleep Disorder 2009- AHI 72.      Impression:  1. Obstructive sleep apnea, adult    2. Periodic limb movement disorder (PLMD)    3. Dry mouth           Plan:  Avoid ice tea at dinner. Try biotene mouth rinse to see if it will control dry mouth. Try Flonase for AR as the nose is runny and he may be breathing through the mouth. PLMD is controlled on Mirapex 0.25mg qhs.    Patient uses the CPAP device and benefits from its use in terms of reduction of hypersomnia and snoring.Weight loss will be strongly beneficial to reduce the severity of sleep-disordered breathing.  Caution during activities that require prolonged concentration is strongly advised if sleepiness returns. Changing of PAP supplies regularly is important for effective use. Patient needs to change cushion on the mask or plugs on nasal pillows along with disposable filters once every month and change mask frame, tubing, headgear and Velcro straps every 6 months at the minimum.       Follow up with Dr. Barone in one year    Thank you for allowing me to participate in your patient's  care.      [facility]  [facility]  Phone: [phone]      Part of this note may be an electronic transcription/translation of spoken language to printed text using the Dragon Dictation System.

## 2018-05-02 LAB
25(OH)D3+25(OH)D2 SERPL-MCNC: 40.9 NG/ML (ref 30–100)
ALBUMIN SERPL-MCNC: 4.3 G/DL (ref 3.5–5.2)
ALBUMIN/GLOB SERPL: 1.6 G/DL
ALP SERPL-CCNC: 39 U/L (ref 39–117)
ALT SERPL-CCNC: 27 U/L (ref 1–41)
AST SERPL-CCNC: 20 U/L (ref 1–40)
BILIRUB SERPL-MCNC: 0.4 MG/DL (ref 0.1–1.2)
BUN SERPL-MCNC: 31 MG/DL (ref 8–23)
BUN/CREAT SERPL: 19.7 (ref 7–25)
C PEPTIDE SERPL-MCNC: 6.6 NG/ML (ref 1.1–4.4)
CALCIUM SERPL-MCNC: 9.7 MG/DL (ref 8.6–10.5)
CHLORIDE SERPL-SCNC: 102 MMOL/L (ref 98–107)
CHOLEST SERPL-MCNC: 145 MG/DL (ref 0–200)
CO2 SERPL-SCNC: 29.7 MMOL/L (ref 22–29)
CREAT SERPL-MCNC: 1.57 MG/DL (ref 0.76–1.27)
GFR SERPLBLD CREATININE-BSD FMLA CKD-EPI: 44 ML/MIN/1.73
GFR SERPLBLD CREATININE-BSD FMLA CKD-EPI: 53 ML/MIN/1.73
GLOBULIN SER CALC-MCNC: 2.7 GM/DL
GLUCOSE SERPL-MCNC: 163 MG/DL (ref 65–99)
HBA1C MFR BLD: 7.3 % (ref 4.8–5.6)
HCT VFR BLD AUTO: 44.8 % (ref 40.4–52.2)
HDLC SERPL-MCNC: 45 MG/DL (ref 40–60)
HGB BLD-MCNC: 14 G/DL (ref 13.7–17.6)
INTERPRETATION: NORMAL
LDLC SERPL CALC-MCNC: 65 MG/DL (ref 0–100)
Lab: NORMAL
POTASSIUM SERPL-SCNC: 4.4 MMOL/L (ref 3.5–5.2)
PROT SERPL-MCNC: 7 G/DL (ref 6–8.5)
PSA SERPL-MCNC: 1.1 NG/ML (ref 0–4)
SHBG SERPL-SCNC: 27.1 NMOL/L (ref 19.3–76.4)
SODIUM SERPL-SCNC: 143 MMOL/L (ref 136–145)
TESTOST FREE SERPL-MCNC: 5.1 PG/ML (ref 6.6–18.1)
TESTOST SERPL-MCNC: 130 NG/DL (ref 264–916)
TRIGL SERPL-MCNC: 175 MG/DL (ref 0–150)
VLDLC SERPL CALC-MCNC: 35 MG/DL (ref 5–40)

## 2018-05-15 ENCOUNTER — OFFICE VISIT (OUTPATIENT)
Dept: ENDOCRINOLOGY | Age: 73
End: 2018-05-15

## 2018-05-15 VITALS
DIASTOLIC BLOOD PRESSURE: 66 MMHG | SYSTOLIC BLOOD PRESSURE: 124 MMHG | WEIGHT: 277 LBS | BODY MASS INDEX: 41.03 KG/M2 | HEIGHT: 69 IN

## 2018-05-15 DIAGNOSIS — E55.9 VITAMIN D DEFICIENCY: ICD-10-CM

## 2018-05-15 DIAGNOSIS — E78.5 HYPERLIPIDEMIA, UNSPECIFIED HYPERLIPIDEMIA TYPE: ICD-10-CM

## 2018-05-15 DIAGNOSIS — IMO0002 UNCONTROLLED TYPE 2 DIABETES MELLITUS WITH OTHER SPECIFIED COMPLICATION, WITH LONG-TERM CURRENT USE OF INSULIN: ICD-10-CM

## 2018-05-15 DIAGNOSIS — E34.9 TESTOSTERONE DEFICIENCY: ICD-10-CM

## 2018-05-15 DIAGNOSIS — I10 BENIGN ESSENTIAL HYPERTENSION: Primary | ICD-10-CM

## 2018-05-15 PROCEDURE — 99214 OFFICE O/P EST MOD 30 MIN: CPT | Performed by: NURSE PRACTITIONER

## 2018-05-15 RX ORDER — SYRINGE W-NEEDLE,DISPOSAB,3 ML 25GX5/8"
SYRINGE, EMPTY DISPOSABLE MISCELLANEOUS
Qty: 6 EACH | Refills: 1 | Status: SHIPPED | OUTPATIENT
Start: 2018-05-15 | End: 2018-12-27 | Stop reason: SDUPTHER

## 2018-05-15 RX ORDER — LIRAGLUTIDE 6 MG/ML
1.8 INJECTION SUBCUTANEOUS DAILY
Qty: 9 PEN | Refills: 1 | Status: SHIPPED | OUTPATIENT
Start: 2018-05-15 | End: 2019-03-20 | Stop reason: SINTOL

## 2018-05-15 RX ORDER — TESTOSTERONE CYPIONATE 200 MG/ML
200 INJECTION, SOLUTION INTRAMUSCULAR
Qty: 2 ML | Refills: 0 | Status: SHIPPED | OUTPATIENT
Start: 2018-05-15 | End: 2018-06-10 | Stop reason: SDUPTHER

## 2018-05-15 RX ORDER — INSULIN GLARGINE 300 U/ML
75 INJECTION, SOLUTION SUBCUTANEOUS DAILY
Qty: 27 ML | Refills: 1 | Status: SHIPPED | OUTPATIENT
Start: 2018-05-15 | End: 2018-08-01 | Stop reason: HOSPADM

## 2018-05-15 NOTE — PATIENT INSTRUCTIONS
testosteone cypionate 200 mg/ml 1 cc IM every 14 days  Continue all other meds  Increase toujeo to 78 units once daily

## 2018-05-15 NOTE — PROGRESS NOTES
"Subjective   James Mckeon is a 73 y.o. male is here today for follow-up.  Chief Complaint   Patient presents with   • Diabetes     recent labs, pt tests BG 1x daily, pt brought meter   • Hyperlipidemia      pt is not currently taking the testosterone. he said that he was in the dougnut hole and it was too expensive, but he wouldl like to try it again.    • Hypertension   • Vitamin D Deficiency   • testosterone deficiency     /66   Ht 175.3 cm (69\")   Wt 126 kg (277 lb)   BMI 40.91 kg/m²   Current Outpatient Prescriptions on File Prior to Visit   Medication Sig   • aspirin 81 MG EC tablet Take 81 mg by mouth daily.   • Calcium Carbonate-Vitamin D (CALCIUM-VITAMIN D) 500-200 MG-UNIT tablet per tablet Take 500 mg by mouth Daily.   • escitalopram (LEXAPRO) 5 MG tablet Take 1 tablet by mouth Daily.   • fenofibrate (TRICOR) 145 MG tablet TAKE 1 TABLET BY MOUTH EVERY DAY   • furosemide (LASIX) 40 MG tablet Take 40 mg by mouth daily.   • glucose blood test strip Test 4 times daily   • Insulin Pen Needle 31G X 8 MM misc Use to inject 2 times daily   • losartan (COZAAR) 25 MG tablet Take 25 mg by mouth daily.   • LUMIGAN 0.01 % ophthalmic drops INSTILL 1 DROP IN OU QHS   • metoprolol succinate XL (TOPROL-XL) 50 MG 24 hr tablet Take 1.5 tablets by mouth every 12 (twelve) hours.   • nateglinide (STARLIX) 120 MG tablet Take 1 tablet by mouth 3 (Three) Times a Day Before Meals.   • potassium chloride (MICRO-K) 10 MEQ CR capsule Take 10 mEq by mouth daily.   • pramipexole (MIRAPEX) 0.25 MG tablet Take 0.25 mg by mouth daily.   • rosuvastatin (CRESTOR) 20 MG tablet Take 20 mg by mouth daily.   • TOUJEO SOLOSTAR 300 UNIT/ML solution pen-injector Inject 75 Units under the skin Daily.   • VICTOZA 18 MG/3ML solution pen-injector injection Inject 1.8 mg under the skin Daily.   • AXIRON 30 MG/ACT solution APPLY 2 PUMPS UNDER EACH ARM DAILY     No current facility-administered medications on file prior to visit.      Family " History   Problem Relation Age of Onset   • Adopted: Yes   • Malig Hyperthermia Neg Hx      Social History   Substance Use Topics   • Smoking status: Former Smoker   • Smokeless tobacco: Not on file      Comment: 50 yrs ago   • Alcohol use Not on file     No Known Allergies      History of Present Illness  Encounter Diagnoses   Name Primary?   • Benign essential hypertension Yes   • Hyperlipidemia, unspecified hyperlipidemia type    • Vitamin D deficiency    • Uncontrolled type 2 diabetes mellitus with other specified complication, with long-term current use of insulin    • Testosterone deficiency      This is a 73-year-old male patient here today for routine follow-up visit.  He is being seen for the above-mentioned problems.  He is currently in a doughnut hole and cannot afford his Axiron testosterone which is causing him $600 a month.  He does want to consider alternate cheaper forms of testosterone therapy.  We discussed testosterone injections in which she was given a prescription.  MAGDALENA Mendoza was reviewed at today's visit.  He had recent labs which were reviewed and he was provided 2 copies since he has an appointment follow-up with his cardiologist tomorrow.  He does follow with nephrology at least yearly.  A copy of the labs will be sent to his nephrologist.  He is checking his blood sugars in the morning his blood sugars typically on the 150 range.  His pump download was reviewed  The following portions of the patient's history were reviewed and updated as appropriate: allergies, current medications, past family history, past medical history, past social history, past surgical history and problem list.    Review of Systems   Constitutional: Negative for fatigue.   HENT: Negative for trouble swallowing.    Eyes: Negative for visual disturbance.   Respiratory: Negative for shortness of breath.    Cardiovascular: Negative for leg swelling.   Endocrine: Negative for polyuria.   Skin: Negative for wound.    Neurological: Negative for numbness.       Objective   Physical Exam   Constitutional: He is oriented to person, place, and time. He appears well-developed and well-nourished. No distress.   HENT:   Head: Normocephalic and atraumatic.   Right Ear: External ear normal.   Left Ear: External ear normal.   Nose: Nose normal.   Eyes: Pupils are equal, round, and reactive to light. Right eye exhibits no discharge. Left eye exhibits no discharge.   Neck: Normal range of motion. Neck supple. Carotid bruit is not present. No tracheal deviation, no edema and no erythema present. No thyromegaly present.   Cardiovascular: Normal rate, regular rhythm, normal heart sounds and intact distal pulses.  Exam reveals no gallop and no friction rub.    No murmur heard.  Pulmonary/Chest: Effort normal and breath sounds normal. No respiratory distress. He has no wheezes. He has no rales.   Abdominal: He exhibits no distension. There is no tenderness.   Musculoskeletal: Normal range of motion. He exhibits edema. He exhibits no deformity.   Trace edema in left lower ext   Lymphadenopathy:     He has no cervical adenopathy.   Neurological: He is alert and oriented to person, place, and time. Coordination normal.   Skin: Skin is warm and dry. No rash noted. He is not diaphoretic. No erythema. No pallor.   Psychiatric: He has a normal mood and affect. His behavior is normal. Judgment and thought content normal.   Nursing note and vitals reviewed.    Lab Results   Component Value Date    HGBA1C 7.30 (H) 04/30/2018     Lab Results   Component Value Date    GLUCOSE 248 (H) 06/02/2014    BUN 31 (H) 04/30/2018    CREATININE 1.57 (H) 04/30/2018    EGFRIFNONA 44 (L) 04/30/2018    EGFRIFAFRI 53 (L) 04/30/2018    BCR 19.7 04/30/2018    K 4.4 04/30/2018    CO2 29.7 (H) 04/30/2018    CALCIUM 9.7 04/30/2018    PROTENTOTREF 7.0 04/30/2018    ALBUMIN 4.30 04/30/2018    LABIL2 1.6 04/30/2018    AST 20 04/30/2018    ALT 27 04/30/2018     Lab Results    Component Value Date    CHLPL 145 04/30/2018    TRIG 175 (H) 04/30/2018    HDL 45 04/30/2018    LDL 65 04/30/2018     Lab Results   Component Value Date    TSH 2.130 11/01/2017    J5NISCV 7.87 06/27/2017         Assessment/Plan   Problems Addressed this Visit        Cardiovascular and Mediastinum    Benign essential hypertension - Primary    Hyperlipidemia       Digestive    Vitamin D deficiency       Endocrine    Testosterone deficiency    Uncontrolled type 2 diabetes mellitus      Other Visit Diagnoses    None.         In summary, patient was seen and examined.  He does have low testosterone according to his recent labs.  A Reji was reviewed and he was given a written prescription for testosterone cypionate 200 mg per 1 cc IM every 14 days.  He was instructed to bring this medication along with syringes back into the office to be shown how to give the injection correctly every 14 days.  His blood pressure is an satisfactory range.  His hemoglobin A1c has improved significantly since his last visit.  I've increased his toujeo to 78 units based on his morning blood sugars which are typically in the 150 range.  He's been advised contact the office should any questions or concerns.  He will follow-up in office in 4 months with labs prior.

## 2018-05-16 RX ORDER — FENOFIBRATE 145 MG/1
145 TABLET, COATED ORAL DAILY
Qty: 30 TABLET | Refills: 5 | Status: SHIPPED | OUTPATIENT
Start: 2018-05-16 | End: 2018-08-01 | Stop reason: HOSPADM

## 2018-06-11 RX ORDER — TESTOSTERONE CYPIONATE 200 MG/ML
INJECTION, SOLUTION INTRAMUSCULAR
Qty: 2 ML | Refills: 0 | OUTPATIENT
Start: 2018-06-11 | End: 2018-07-11 | Stop reason: SDUPTHER

## 2018-07-12 RX ORDER — TESTOSTERONE CYPIONATE 200 MG/ML
INJECTION, SOLUTION INTRAMUSCULAR
Qty: 2 ML | Refills: 0 | OUTPATIENT
Start: 2018-07-12 | End: 2018-12-04 | Stop reason: SDUPTHER

## 2018-07-29 ENCOUNTER — APPOINTMENT (OUTPATIENT)
Dept: CT IMAGING | Facility: HOSPITAL | Age: 73
End: 2018-07-29

## 2018-07-29 ENCOUNTER — HOSPITAL ENCOUNTER (OUTPATIENT)
Facility: HOSPITAL | Age: 73
Setting detail: OBSERVATION
LOS: 2 days | Discharge: HOME | End: 2018-08-01
Attending: EMERGENCY MEDICINE | Admitting: EMERGENCY MEDICINE

## 2018-07-29 DIAGNOSIS — E11.65 TYPE 2 DIABETES MELLITUS WITH HYPERGLYCEMIA (HCC): ICD-10-CM

## 2018-07-29 DIAGNOSIS — K92.2 LOWER GI BLEED: Primary | ICD-10-CM

## 2018-07-29 DIAGNOSIS — I10 BENIGN ESSENTIAL HYPERTENSION: ICD-10-CM

## 2018-07-29 DIAGNOSIS — E78.5 HYPERLIPIDEMIA: ICD-10-CM

## 2018-07-29 DIAGNOSIS — K92.2 LGI BLEED: ICD-10-CM

## 2018-07-29 DIAGNOSIS — N18.30 CHRONIC KIDNEY DISEASE, STAGE III (MODERATE) (HCC): ICD-10-CM

## 2018-07-29 DIAGNOSIS — E34.9 TESTOSTERONE DEFICIENCY: ICD-10-CM

## 2018-07-29 LAB
ABO GROUP BLD: NORMAL
ALBUMIN SERPL-MCNC: 4 G/DL (ref 3.5–5.2)
ALBUMIN/GLOB SERPL: 1.4 G/DL
ALP SERPL-CCNC: 35 U/L (ref 39–117)
ALT SERPL W P-5'-P-CCNC: 33 U/L (ref 1–41)
ANION GAP SERPL CALCULATED.3IONS-SCNC: 12.9 MMOL/L
AST SERPL-CCNC: 18 U/L (ref 1–40)
BASOPHILS # BLD AUTO: 0.04 10*3/MM3 (ref 0–0.2)
BASOPHILS NFR BLD AUTO: 0.4 % (ref 0–1.5)
BILIRUB SERPL-MCNC: 0.3 MG/DL (ref 0.1–1.2)
BLD GP AB SCN SERPL QL: NEGATIVE
BUN BLD-MCNC: 31 MG/DL (ref 8–23)
BUN/CREAT SERPL: 18.5 (ref 7–25)
CALCIUM SPEC-SCNC: 9.9 MG/DL (ref 8.6–10.5)
CHLORIDE SERPL-SCNC: 102 MMOL/L (ref 98–107)
CO2 SERPL-SCNC: 29.1 MMOL/L (ref 22–29)
CREAT BLD-MCNC: 1.68 MG/DL (ref 0.76–1.27)
D-LACTATE SERPL-SCNC: 2 MMOL/L (ref 0.5–2)
DEPRECATED RDW RBC AUTO: 47.1 FL (ref 37–54)
EOSINOPHIL # BLD AUTO: 0.22 10*3/MM3 (ref 0–0.7)
EOSINOPHIL NFR BLD AUTO: 2 % (ref 0.3–6.2)
ERYTHROCYTE [DISTWIDTH] IN BLOOD BY AUTOMATED COUNT: 14.8 % (ref 11.5–14.5)
GFR SERPL CREATININE-BSD FRML MDRD: 40 ML/MIN/1.73
GLOBULIN UR ELPH-MCNC: 2.9 GM/DL
GLUCOSE BLD-MCNC: 217 MG/DL (ref 65–99)
HCT VFR BLD AUTO: 46 % (ref 40.4–52.2)
HGB BLD-MCNC: 14.2 G/DL (ref 13.7–17.6)
HOLD SPECIMEN: NORMAL
HOLD SPECIMEN: NORMAL
IMM GRANULOCYTES # BLD: 0.08 10*3/MM3 (ref 0–0.03)
IMM GRANULOCYTES NFR BLD: 0.7 % (ref 0–0.5)
INR PPP: 1 (ref 0.9–1.1)
LYMPHOCYTES # BLD AUTO: 2.24 10*3/MM3 (ref 0.9–4.8)
LYMPHOCYTES NFR BLD AUTO: 20.4 % (ref 19.6–45.3)
MCH RBC QN AUTO: 26.9 PG (ref 27–32.7)
MCHC RBC AUTO-ENTMCNC: 30.9 G/DL (ref 32.6–36.4)
MCV RBC AUTO: 87.3 FL (ref 79.8–96.2)
MONOCYTES # BLD AUTO: 0.86 10*3/MM3 (ref 0.2–1.2)
MONOCYTES NFR BLD AUTO: 7.8 % (ref 5–12)
NEUTROPHILS # BLD AUTO: 7.62 10*3/MM3 (ref 1.9–8.1)
NEUTROPHILS NFR BLD AUTO: 69.4 % (ref 42.7–76)
PLATELET # BLD AUTO: 191 10*3/MM3 (ref 140–500)
PMV BLD AUTO: 12 FL (ref 6–12)
POTASSIUM BLD-SCNC: 4.3 MMOL/L (ref 3.5–5.2)
PROT SERPL-MCNC: 6.9 G/DL (ref 6–8.5)
PROTHROMBIN TIME: 13 SECONDS (ref 11.7–14.2)
RBC # BLD AUTO: 5.27 10*6/MM3 (ref 4.6–6)
RH BLD: POSITIVE
SODIUM BLD-SCNC: 144 MMOL/L (ref 136–145)
T&S EXPIRATION DATE: NORMAL
WBC NRBC COR # BLD: 10.98 10*3/MM3 (ref 4.5–10.7)
WHOLE BLOOD HOLD SPECIMEN: NORMAL
WHOLE BLOOD HOLD SPECIMEN: NORMAL

## 2018-07-29 PROCEDURE — 80053 COMPREHEN METABOLIC PANEL: CPT | Performed by: EMERGENCY MEDICINE

## 2018-07-29 PROCEDURE — 96360 HYDRATION IV INFUSION INIT: CPT

## 2018-07-29 PROCEDURE — 85610 PROTHROMBIN TIME: CPT | Performed by: EMERGENCY MEDICINE

## 2018-07-29 PROCEDURE — 74176 CT ABD & PELVIS W/O CONTRAST: CPT

## 2018-07-29 PROCEDURE — 86900 BLOOD TYPING SEROLOGIC ABO: CPT | Performed by: EMERGENCY MEDICINE

## 2018-07-29 PROCEDURE — 86850 RBC ANTIBODY SCREEN: CPT | Performed by: EMERGENCY MEDICINE

## 2018-07-29 PROCEDURE — 86901 BLOOD TYPING SEROLOGIC RH(D): CPT | Performed by: EMERGENCY MEDICINE

## 2018-07-29 PROCEDURE — 99284 EMERGENCY DEPT VISIT MOD MDM: CPT

## 2018-07-29 PROCEDURE — 83605 ASSAY OF LACTIC ACID: CPT | Performed by: EMERGENCY MEDICINE

## 2018-07-29 PROCEDURE — 85025 COMPLETE CBC W/AUTO DIFF WBC: CPT | Performed by: EMERGENCY MEDICINE

## 2018-07-29 RX ORDER — SODIUM CHLORIDE 0.9 % (FLUSH) 0.9 %
10 SYRINGE (ML) INJECTION AS NEEDED
Status: DISCONTINUED | OUTPATIENT
Start: 2018-07-29 | End: 2018-08-01 | Stop reason: HOSPADM

## 2018-07-29 RX ADMIN — SODIUM CHLORIDE 1000 ML: 9 INJECTION, SOLUTION INTRAVENOUS at 22:13

## 2018-07-30 ENCOUNTER — APPOINTMENT (OUTPATIENT)
Dept: NUCLEAR MEDICINE | Facility: HOSPITAL | Age: 73
End: 2018-07-30

## 2018-07-30 PROBLEM — K92.2 LGI BLEED: Status: ACTIVE | Noted: 2018-07-30

## 2018-07-30 LAB
ANION GAP SERPL CALCULATED.3IONS-SCNC: 11.1 MMOL/L
BASOPHILS # BLD AUTO: 0.05 10*3/MM3 (ref 0–0.2)
BASOPHILS NFR BLD AUTO: 0.5 % (ref 0–1.5)
BUN BLD-MCNC: 29 MG/DL (ref 8–23)
BUN/CREAT SERPL: 20.1 (ref 7–25)
CALCIUM SPEC-SCNC: 8.8 MG/DL (ref 8.6–10.5)
CHLORIDE SERPL-SCNC: 106 MMOL/L (ref 98–107)
CO2 SERPL-SCNC: 24.9 MMOL/L (ref 22–29)
CREAT BLD-MCNC: 1.44 MG/DL (ref 0.76–1.27)
DEPRECATED RDW RBC AUTO: 47.3 FL (ref 37–54)
DEPRECATED RDW RBC AUTO: 47.5 FL (ref 37–54)
DEPRECATED RDW RBC AUTO: 47.7 FL (ref 37–54)
EOSINOPHIL # BLD AUTO: 0.33 10*3/MM3 (ref 0–0.7)
EOSINOPHIL NFR BLD AUTO: 3.2 % (ref 0.3–6.2)
ERYTHROCYTE [DISTWIDTH] IN BLOOD BY AUTOMATED COUNT: 14.7 % (ref 11.5–14.5)
ERYTHROCYTE [DISTWIDTH] IN BLOOD BY AUTOMATED COUNT: 14.8 % (ref 11.5–14.5)
ERYTHROCYTE [DISTWIDTH] IN BLOOD BY AUTOMATED COUNT: 15 % (ref 11.5–14.5)
GFR SERPL CREATININE-BSD FRML MDRD: 48 ML/MIN/1.73
GLUCOSE BLD-MCNC: 111 MG/DL (ref 65–99)
GLUCOSE BLDC GLUCOMTR-MCNC: 105 MG/DL (ref 70–130)
GLUCOSE BLDC GLUCOMTR-MCNC: 106 MG/DL (ref 70–130)
GLUCOSE BLDC GLUCOMTR-MCNC: 112 MG/DL (ref 70–130)
GLUCOSE BLDC GLUCOMTR-MCNC: 94 MG/DL (ref 70–130)
HBA1C MFR BLD: 7.4 % (ref 4.8–5.6)
HCT VFR BLD AUTO: 41.9 % (ref 40.4–52.2)
HCT VFR BLD AUTO: 44.4 % (ref 40.4–52.2)
HCT VFR BLD AUTO: 44.8 % (ref 40.4–52.2)
HGB BLD-MCNC: 12.8 G/DL (ref 13.7–17.6)
HGB BLD-MCNC: 13.2 G/DL (ref 13.7–17.6)
HGB BLD-MCNC: 13.3 G/DL (ref 13.7–17.6)
IMM GRANULOCYTES # BLD: 0.05 10*3/MM3 (ref 0–0.03)
IMM GRANULOCYTES NFR BLD: 0.5 % (ref 0–0.5)
LYMPHOCYTES # BLD AUTO: 2.53 10*3/MM3 (ref 0.9–4.8)
LYMPHOCYTES NFR BLD AUTO: 24.5 % (ref 19.6–45.3)
MCH RBC QN AUTO: 26.4 PG (ref 27–32.7)
MCH RBC QN AUTO: 26.6 PG (ref 27–32.7)
MCH RBC QN AUTO: 26.6 PG (ref 27–32.7)
MCHC RBC AUTO-ENTMCNC: 29.7 G/DL (ref 32.6–36.4)
MCHC RBC AUTO-ENTMCNC: 29.7 G/DL (ref 32.6–36.4)
MCHC RBC AUTO-ENTMCNC: 30.5 G/DL (ref 32.6–36.4)
MCV RBC AUTO: 87.1 FL (ref 79.8–96.2)
MCV RBC AUTO: 88.9 FL (ref 79.8–96.2)
MCV RBC AUTO: 89.3 FL (ref 79.8–96.2)
MONOCYTES # BLD AUTO: 1 10*3/MM3 (ref 0.2–1.2)
MONOCYTES NFR BLD AUTO: 9.7 % (ref 5–12)
NEUTROPHILS # BLD AUTO: 6.42 10*3/MM3 (ref 1.9–8.1)
NEUTROPHILS NFR BLD AUTO: 62.1 % (ref 42.7–76)
PLATELET # BLD AUTO: 186 10*3/MM3 (ref 140–500)
PLATELET # BLD AUTO: 196 10*3/MM3 (ref 140–500)
PLATELET # BLD AUTO: 199 10*3/MM3 (ref 140–500)
PMV BLD AUTO: 11.6 FL (ref 6–12)
PMV BLD AUTO: 11.7 FL (ref 6–12)
PMV BLD AUTO: 12 FL (ref 6–12)
POTASSIUM BLD-SCNC: 4.1 MMOL/L (ref 3.5–5.2)
RBC # BLD AUTO: 4.81 10*6/MM3 (ref 4.6–6)
RBC # BLD AUTO: 4.97 10*6/MM3 (ref 4.6–6)
RBC # BLD AUTO: 5.04 10*6/MM3 (ref 4.6–6)
SODIUM BLD-SCNC: 142 MMOL/L (ref 136–145)
WBC NRBC COR # BLD: 10.32 10*3/MM3 (ref 4.5–10.7)
WBC NRBC COR # BLD: 10.33 10*3/MM3 (ref 4.5–10.7)
WBC NRBC COR # BLD: 9.71 10*3/MM3 (ref 4.5–10.7)

## 2018-07-30 PROCEDURE — 63710000001 DIPHENHYDRAMINE PER 50 MG: Performed by: NURSE PRACTITIONER

## 2018-07-30 PROCEDURE — 80048 BASIC METABOLIC PNL TOTAL CA: CPT | Performed by: NURSE PRACTITIONER

## 2018-07-30 PROCEDURE — 82962 GLUCOSE BLOOD TEST: CPT

## 2018-07-30 PROCEDURE — 99223 1ST HOSP IP/OBS HIGH 75: CPT | Performed by: SURGERY

## 2018-07-30 PROCEDURE — 85027 COMPLETE CBC AUTOMATED: CPT | Performed by: SURGERY

## 2018-07-30 PROCEDURE — 0 TECHNETIUM LABELED RED BLOOD CELLS: Performed by: INTERNAL MEDICINE

## 2018-07-30 PROCEDURE — 83036 HEMOGLOBIN GLYCOSYLATED A1C: CPT | Performed by: INTERNAL MEDICINE

## 2018-07-30 PROCEDURE — 78278 ACUTE GI BLOOD LOSS IMAGING: CPT

## 2018-07-30 PROCEDURE — 96361 HYDRATE IV INFUSION ADD-ON: CPT

## 2018-07-30 PROCEDURE — A9560 TC99M LABELED RBC: HCPCS | Performed by: INTERNAL MEDICINE

## 2018-07-30 PROCEDURE — 85025 COMPLETE CBC W/AUTO DIFF WBC: CPT | Performed by: NURSE PRACTITIONER

## 2018-07-30 RX ORDER — ONDANSETRON 4 MG/1
4 TABLET, FILM COATED ORAL EVERY 6 HOURS PRN
Status: DISCONTINUED | OUTPATIENT
Start: 2018-07-30 | End: 2018-08-01 | Stop reason: HOSPADM

## 2018-07-30 RX ORDER — ACETAMINOPHEN 500 MG
1000 TABLET ORAL ONCE
Status: COMPLETED | OUTPATIENT
Start: 2018-07-30 | End: 2018-07-30

## 2018-07-30 RX ORDER — POLYETHYLENE GLYCOL 3350, SODIUM CHLORIDE, POTASSIUM CHLORIDE, SODIUM BICARBONATE, AND SODIUM SULFATE 240; 5.84; 2.98; 6.72; 22.72 G/4L; G/4L; G/4L; G/4L; G/4L
2000 POWDER, FOR SOLUTION ORAL EVERY 8 HOURS SCHEDULED
Status: COMPLETED | OUTPATIENT
Start: 2018-07-30 | End: 2018-07-31

## 2018-07-30 RX ORDER — REPAGLINIDE 2 MG/1
2 TABLET ORAL
Status: DISCONTINUED | OUTPATIENT
Start: 2018-07-30 | End: 2018-08-01 | Stop reason: HOSPADM

## 2018-07-30 RX ORDER — DIPHENHYDRAMINE HCL 25 MG
25 CAPSULE ORAL ONCE
Status: COMPLETED | OUTPATIENT
Start: 2018-07-30 | End: 2018-07-30

## 2018-07-30 RX ORDER — NATEGLINIDE 120 MG/1
TABLET ORAL
Qty: 90 TABLET | Refills: 0 | OUTPATIENT
Start: 2018-07-30 | End: 2018-08-01 | Stop reason: HOSPADM

## 2018-07-30 RX ORDER — SODIUM CHLORIDE 0.9 % (FLUSH) 0.9 %
1-10 SYRINGE (ML) INJECTION AS NEEDED
Status: DISCONTINUED | OUTPATIENT
Start: 2018-07-30 | End: 2018-08-01 | Stop reason: HOSPADM

## 2018-07-30 RX ORDER — ROSUVASTATIN CALCIUM 20 MG/1
20 TABLET, COATED ORAL DAILY
Status: DISCONTINUED | OUTPATIENT
Start: 2018-07-30 | End: 2018-08-01 | Stop reason: HOSPADM

## 2018-07-30 RX ORDER — NITROGLYCERIN 0.4 MG/1
0.4 TABLET SUBLINGUAL
Status: DISCONTINUED | OUTPATIENT
Start: 2018-07-30 | End: 2018-08-01 | Stop reason: HOSPADM

## 2018-07-30 RX ORDER — MELATONIN
2000 DAILY
Status: DISCONTINUED | OUTPATIENT
Start: 2018-07-30 | End: 2018-08-01 | Stop reason: HOSPADM

## 2018-07-30 RX ORDER — ESCITALOPRAM OXALATE 5 MG/1
5 TABLET ORAL DAILY
Status: DISCONTINUED | OUTPATIENT
Start: 2018-07-30 | End: 2018-08-01 | Stop reason: HOSPADM

## 2018-07-30 RX ORDER — NICOTINE POLACRILEX 4 MG
15 LOZENGE BUCCAL
Status: DISCONTINUED | OUTPATIENT
Start: 2018-07-30 | End: 2018-08-01 | Stop reason: HOSPADM

## 2018-07-30 RX ORDER — SODIUM CHLORIDE 9 MG/ML
75 INJECTION, SOLUTION INTRAVENOUS CONTINUOUS
Status: DISCONTINUED | OUTPATIENT
Start: 2018-07-30 | End: 2018-07-31

## 2018-07-30 RX ORDER — ACETAMINOPHEN 325 MG/1
650 TABLET ORAL EVERY 4 HOURS PRN
Status: DISCONTINUED | OUTPATIENT
Start: 2018-07-30 | End: 2018-08-01 | Stop reason: HOSPADM

## 2018-07-30 RX ORDER — PRAMIPEXOLE DIHYDROCHLORIDE 0.25 MG/1
0.25 TABLET ORAL DAILY
Status: DISCONTINUED | OUTPATIENT
Start: 2018-07-30 | End: 2018-08-01 | Stop reason: HOSPADM

## 2018-07-30 RX ORDER — ACETAMINOPHEN 500 MG
TABLET ORAL
Status: COMPLETED
Start: 2018-07-30 | End: 2018-07-30

## 2018-07-30 RX ORDER — ONDANSETRON 4 MG/1
4 TABLET, ORALLY DISINTEGRATING ORAL EVERY 6 HOURS PRN
Status: DISCONTINUED | OUTPATIENT
Start: 2018-07-30 | End: 2018-08-01 | Stop reason: HOSPADM

## 2018-07-30 RX ORDER — ONDANSETRON 2 MG/ML
4 INJECTION INTRAMUSCULAR; INTRAVENOUS EVERY 6 HOURS PRN
Status: DISCONTINUED | OUTPATIENT
Start: 2018-07-30 | End: 2018-08-01 | Stop reason: HOSPADM

## 2018-07-30 RX ORDER — DEXTROSE MONOHYDRATE 25 G/50ML
25 INJECTION, SOLUTION INTRAVENOUS
Status: DISCONTINUED | OUTPATIENT
Start: 2018-07-30 | End: 2018-08-01 | Stop reason: HOSPADM

## 2018-07-30 RX ORDER — FENOFIBRATE 145 MG/1
145 TABLET, COATED ORAL DAILY
Status: DISCONTINUED | OUTPATIENT
Start: 2018-07-30 | End: 2018-08-01 | Stop reason: HOSPADM

## 2018-07-30 RX ADMIN — SODIUM CHLORIDE 500 ML: 9 INJECTION, SOLUTION INTRAVENOUS at 00:56

## 2018-07-30 RX ADMIN — ROSUVASTATIN CALCIUM 20 MG: 20 TABLET, FILM COATED ORAL at 10:55

## 2018-07-30 RX ADMIN — ACETAMINOPHEN 1000 MG: 500 TABLET, FILM COATED ORAL at 01:19

## 2018-07-30 RX ADMIN — METOPROLOL SUCCINATE 75 MG: 50 TABLET, FILM COATED, EXTENDED RELEASE ORAL at 08:27

## 2018-07-30 RX ADMIN — TECHNETIUM TC 99M-LABELED RED BLOOD CELLS 1 DOSE: KIT at 14:06

## 2018-07-30 RX ADMIN — Medication 1000 MG: at 01:19

## 2018-07-30 RX ADMIN — PRAMIPEXOLE DIHYDROCHLORIDE 0.25 MG: 0.25 TABLET ORAL at 21:00

## 2018-07-30 RX ADMIN — VITAMIN D, TAB 1000IU (100/BT) 2000 UNITS: 25 TAB at 10:56

## 2018-07-30 RX ADMIN — POLYETHYLENE GLYCOL-3350 AND ELECTROLYTES WITH FLAVOR PACK 2000 ML: 240; 5.84; 2.98; 6.72; 22.72 POWDER, FOR SOLUTION ORAL at 20:35

## 2018-07-30 RX ADMIN — DIPHENHYDRAMINE HYDROCHLORIDE 25 MG: 25 CAPSULE ORAL at 04:14

## 2018-07-30 RX ADMIN — FENOFIBRATE 145 MG: 145 TABLET ORAL at 10:55

## 2018-07-30 RX ADMIN — ESCITALOPRAM 5 MG: 5 TABLET, FILM COATED ORAL at 10:55

## 2018-07-30 RX ADMIN — METOPROLOL SUCCINATE 75 MG: 50 TABLET, FILM COATED, EXTENDED RELEASE ORAL at 20:35

## 2018-07-30 RX ADMIN — SODIUM CHLORIDE 125 ML/HR: 9 INJECTION, SOLUTION INTRAVENOUS at 04:54

## 2018-07-31 ENCOUNTER — ANESTHESIA EVENT (OUTPATIENT)
Dept: GASTROENTEROLOGY | Facility: HOSPITAL | Age: 73
End: 2018-07-31

## 2018-07-31 ENCOUNTER — ANESTHESIA (OUTPATIENT)
Dept: GASTROENTEROLOGY | Facility: HOSPITAL | Age: 73
End: 2018-07-31

## 2018-07-31 LAB
ANION GAP SERPL CALCULATED.3IONS-SCNC: 14.9 MMOL/L
BUN BLD-MCNC: 20 MG/DL (ref 8–23)
BUN/CREAT SERPL: 15.3 (ref 7–25)
CALCIUM SPEC-SCNC: 8.2 MG/DL (ref 8.6–10.5)
CHLORIDE SERPL-SCNC: 103 MMOL/L (ref 98–107)
CK MB SERPL-CCNC: 4.17 NG/ML
CK SERPL-CCNC: 190 U/L (ref 20–200)
CO2 SERPL-SCNC: 22.1 MMOL/L (ref 22–29)
CREAT BLD-MCNC: 1.31 MG/DL (ref 0.76–1.27)
DEPRECATED RDW RBC AUTO: 46.9 FL (ref 37–54)
DEPRECATED RDW RBC AUTO: 47.7 FL (ref 37–54)
ERYTHROCYTE [DISTWIDTH] IN BLOOD BY AUTOMATED COUNT: 14.7 % (ref 11.5–14.5)
ERYTHROCYTE [DISTWIDTH] IN BLOOD BY AUTOMATED COUNT: 14.7 % (ref 11.5–14.5)
GFR SERPL CREATININE-BSD FRML MDRD: 54 ML/MIN/1.73
GLUCOSE BLD-MCNC: 107 MG/DL (ref 65–99)
GLUCOSE BLDC GLUCOMTR-MCNC: 134 MG/DL (ref 70–130)
GLUCOSE BLDC GLUCOMTR-MCNC: 169 MG/DL (ref 70–130)
GLUCOSE BLDC GLUCOMTR-MCNC: 96 MG/DL (ref 70–130)
GLUCOSE BLDC GLUCOMTR-MCNC: 99 MG/DL (ref 70–130)
HCT VFR BLD AUTO: 40.1 % (ref 40.4–52.2)
HCT VFR BLD AUTO: 40.5 % (ref 40.4–52.2)
HGB BLD-MCNC: 12.3 G/DL (ref 13.7–17.6)
HGB BLD-MCNC: 12.3 G/DL (ref 13.7–17.6)
MAGNESIUM SERPL-MCNC: 2 MG/DL (ref 1.6–2.4)
MCH RBC QN AUTO: 26.6 PG (ref 27–32.7)
MCH RBC QN AUTO: 26.9 PG (ref 27–32.7)
MCHC RBC AUTO-ENTMCNC: 30.4 G/DL (ref 32.6–36.4)
MCHC RBC AUTO-ENTMCNC: 30.7 G/DL (ref 32.6–36.4)
MCV RBC AUTO: 86.6 FL (ref 79.8–96.2)
MCV RBC AUTO: 88.4 FL (ref 79.8–96.2)
PLATELET # BLD AUTO: 160 10*3/MM3 (ref 140–500)
PLATELET # BLD AUTO: 185 10*3/MM3 (ref 140–500)
PMV BLD AUTO: 11.5 FL (ref 6–12)
PMV BLD AUTO: 11.8 FL (ref 6–12)
POTASSIUM BLD-SCNC: 3.8 MMOL/L (ref 3.5–5.2)
POTASSIUM BLD-SCNC: 3.9 MMOL/L (ref 3.5–5.2)
RBC # BLD AUTO: 4.58 10*6/MM3 (ref 4.6–6)
RBC # BLD AUTO: 4.63 10*6/MM3 (ref 4.6–6)
SODIUM BLD-SCNC: 140 MMOL/L (ref 136–145)
TROPONIN T SERPL-MCNC: <0.01 NG/ML (ref 0–0.03)
WBC NRBC COR # BLD: 10.79 10*3/MM3 (ref 4.5–10.7)
WBC NRBC COR # BLD: 8.97 10*3/MM3 (ref 4.5–10.7)

## 2018-07-31 PROCEDURE — 63710000001 INSULIN ASPART PER 5 UNITS: Performed by: INTERNAL MEDICINE

## 2018-07-31 PROCEDURE — 82553 CREATINE MB FRACTION: CPT | Performed by: INTERNAL MEDICINE

## 2018-07-31 PROCEDURE — 25010000002 PROPOFOL 10 MG/ML EMULSION: Performed by: ANESTHESIOLOGY

## 2018-07-31 PROCEDURE — 96376 TX/PRO/DX INJ SAME DRUG ADON: CPT

## 2018-07-31 PROCEDURE — 25010000002 ONDANSETRON PER 1 MG: Performed by: NURSE PRACTITIONER

## 2018-07-31 PROCEDURE — 85027 COMPLETE CBC AUTOMATED: CPT | Performed by: SURGERY

## 2018-07-31 PROCEDURE — 93010 ELECTROCARDIOGRAM REPORT: CPT | Performed by: INTERNAL MEDICINE

## 2018-07-31 PROCEDURE — 96361 HYDRATE IV INFUSION ADD-ON: CPT

## 2018-07-31 PROCEDURE — 93005 ELECTROCARDIOGRAM TRACING: CPT | Performed by: INTERNAL MEDICINE

## 2018-07-31 PROCEDURE — 94799 UNLISTED PULMONARY SVC/PX: CPT

## 2018-07-31 PROCEDURE — 84484 ASSAY OF TROPONIN QUANT: CPT | Performed by: INTERNAL MEDICINE

## 2018-07-31 PROCEDURE — 96374 THER/PROPH/DIAG INJ IV PUSH: CPT

## 2018-07-31 PROCEDURE — 82962 GLUCOSE BLOOD TEST: CPT

## 2018-07-31 PROCEDURE — 84132 ASSAY OF SERUM POTASSIUM: CPT | Performed by: INTERNAL MEDICINE

## 2018-07-31 PROCEDURE — 45378 DIAGNOSTIC COLONOSCOPY: CPT | Performed by: SURGERY

## 2018-07-31 PROCEDURE — 80048 BASIC METABOLIC PNL TOTAL CA: CPT | Performed by: INTERNAL MEDICINE

## 2018-07-31 PROCEDURE — 83735 ASSAY OF MAGNESIUM: CPT | Performed by: INTERNAL MEDICINE

## 2018-07-31 PROCEDURE — 82550 ASSAY OF CK (CPK): CPT | Performed by: INTERNAL MEDICINE

## 2018-07-31 RX ORDER — LIDOCAINE HYDROCHLORIDE 10 MG/ML
0.5 INJECTION, SOLUTION INFILTRATION; PERINEURAL ONCE AS NEEDED
Status: DISCONTINUED | OUTPATIENT
Start: 2018-07-31 | End: 2018-07-31

## 2018-07-31 RX ORDER — LIDOCAINE HYDROCHLORIDE 20 MG/ML
INJECTION, SOLUTION INFILTRATION; PERINEURAL AS NEEDED
Status: DISCONTINUED | OUTPATIENT
Start: 2018-07-31 | End: 2018-07-31 | Stop reason: SURG

## 2018-07-31 RX ORDER — SODIUM CHLORIDE 0.9 % (FLUSH) 0.9 %
3 SYRINGE (ML) INJECTION AS NEEDED
Status: DISCONTINUED | OUTPATIENT
Start: 2018-07-31 | End: 2018-07-31

## 2018-07-31 RX ORDER — SODIUM CHLORIDE, SODIUM LACTATE, POTASSIUM CHLORIDE, CALCIUM CHLORIDE 600; 310; 30; 20 MG/100ML; MG/100ML; MG/100ML; MG/100ML
1000 INJECTION, SOLUTION INTRAVENOUS CONTINUOUS
Status: DISCONTINUED | OUTPATIENT
Start: 2018-07-31 | End: 2018-08-01 | Stop reason: HOSPADM

## 2018-07-31 RX ORDER — PROPOFOL 10 MG/ML
VIAL (ML) INTRAVENOUS AS NEEDED
Status: DISCONTINUED | OUTPATIENT
Start: 2018-07-31 | End: 2018-07-31 | Stop reason: SURG

## 2018-07-31 RX ORDER — LATANOPROST 50 UG/ML
1 SOLUTION/ DROPS OPHTHALMIC NIGHTLY
Status: DISCONTINUED | OUTPATIENT
Start: 2018-07-31 | End: 2018-08-01 | Stop reason: HOSPADM

## 2018-07-31 RX ADMIN — ESCITALOPRAM 5 MG: 5 TABLET, FILM COATED ORAL at 08:24

## 2018-07-31 RX ADMIN — PRAMIPEXOLE DIHYDROCHLORIDE 0.25 MG: 0.25 TABLET ORAL at 20:04

## 2018-07-31 RX ADMIN — ACETAMINOPHEN 650 MG: 325 TABLET, FILM COATED ORAL at 07:24

## 2018-07-31 RX ADMIN — REPAGLINIDE 2 MG: 2 TABLET ORAL at 17:28

## 2018-07-31 RX ADMIN — SODIUM CHLORIDE 75 ML/HR: 9 INJECTION, SOLUTION INTRAVENOUS at 05:17

## 2018-07-31 RX ADMIN — NITROGLYCERIN 0.4 MG: 0.4 TABLET SUBLINGUAL at 00:33

## 2018-07-31 RX ADMIN — PROPOFOL 100 MG: 10 INJECTION, EMULSION INTRAVENOUS at 13:26

## 2018-07-31 RX ADMIN — NITROGLYCERIN 0.4 MG: 0.4 TABLET SUBLINGUAL at 00:28

## 2018-07-31 RX ADMIN — ACETAMINOPHEN 650 MG: 325 TABLET, FILM COATED ORAL at 14:39

## 2018-07-31 RX ADMIN — ACETAMINOPHEN 650 MG: 325 TABLET, FILM COATED ORAL at 20:03

## 2018-07-31 RX ADMIN — SODIUM CHLORIDE, POTASSIUM CHLORIDE, SODIUM LACTATE AND CALCIUM CHLORIDE 1000 ML: 600; 310; 30; 20 INJECTION, SOLUTION INTRAVENOUS at 12:29

## 2018-07-31 RX ADMIN — NITROGLYCERIN 0.4 MG: 0.4 TABLET SUBLINGUAL at 00:23

## 2018-07-31 RX ADMIN — ONDANSETRON 4 MG: 2 INJECTION, SOLUTION INTRAMUSCULAR; INTRAVENOUS at 00:47

## 2018-07-31 RX ADMIN — PROPOFOL 200 MG: 10 INJECTION, EMULSION INTRAVENOUS at 13:15

## 2018-07-31 RX ADMIN — ONDANSETRON 4 MG: 2 INJECTION, SOLUTION INTRAMUSCULAR; INTRAVENOUS at 08:18

## 2018-07-31 RX ADMIN — PROPOFOL 200 MG: 10 INJECTION, EMULSION INTRAVENOUS at 13:37

## 2018-07-31 RX ADMIN — FENOFIBRATE 145 MG: 145 TABLET ORAL at 08:24

## 2018-07-31 RX ADMIN — PROPOFOL 100 MG: 10 INJECTION, EMULSION INTRAVENOUS at 13:22

## 2018-07-31 RX ADMIN — ONDANSETRON 4 MG: 2 INJECTION, SOLUTION INTRAMUSCULAR; INTRAVENOUS at 22:55

## 2018-07-31 RX ADMIN — INSULIN ASPART 2 UNITS: 100 INJECTION, SOLUTION INTRAVENOUS; SUBCUTANEOUS at 22:51

## 2018-07-31 RX ADMIN — POLYETHYLENE GLYCOL-3350 AND ELECTROLYTES WITH FLAVOR PACK 2000 ML: 240; 5.84; 2.98; 6.72; 22.72 POWDER, FOR SOLUTION ORAL at 05:17

## 2018-07-31 RX ADMIN — METOPROLOL SUCCINATE 75 MG: 50 TABLET, FILM COATED, EXTENDED RELEASE ORAL at 08:24

## 2018-07-31 RX ADMIN — LIDOCAINE HYDROCHLORIDE 100 MG: 20 INJECTION, SOLUTION INFILTRATION; PERINEURAL at 13:14

## 2018-07-31 RX ADMIN — METOPROLOL SUCCINATE 75 MG: 50 TABLET, FILM COATED, EXTENDED RELEASE ORAL at 20:04

## 2018-07-31 RX ADMIN — LATANOPROST 1 DROP: 50 SOLUTION OPHTHALMIC at 20:04

## 2018-07-31 RX ADMIN — ROSUVASTATIN CALCIUM 20 MG: 20 TABLET, FILM COATED ORAL at 08:24

## 2018-07-31 NOTE — ANESTHESIA PROCEDURE NOTES
Airway  Urgency: emergent    Date/Time: 7/31/2018 1:23 PM  End Time:7/31/2018 1:23 PM  Difficult airway    General Information and Staff    Patient location during procedure: OR (endo)  Anesthesiologist: AURELIO BRADY    Indications and Patient Condition  Indications for airway management: airway protection and respiratory distress/failure    Preoxygenated: yes  MILS not maintained throughout      Final Airway Details  Final airway type: supraglottic airway      Successful airway: classic  Size 5    Number of attempts at approach: 1    Additional Comments  Inserted easily, Atraumatic  Teeth ok

## 2018-07-31 NOTE — ANESTHESIA POSTPROCEDURE EVALUATION
"Patient: James Mckeon    Procedure Summary     Date:  07/31/18 Room / Location:  [facility] ENDOSCOPY 8 /  [facility]    Anesthesia Start:  1312 Anesthesia Stop:  1401    Procedure:  COLONOSCOPY into cecum and TI (N/A ) Diagnosis:       LGI bleed      (LGI bleed [K92.2])    Surgeon:  Olimpia Blanc MD Provider:  Penny Pettit MD    Anesthesia Type:  MAC ASA Status:  3          Anesthesia Type: MAC  Last vitals  BP   155/76 (07/31/18 1443)   Temp   37.1 °C (98.7 °F) (07/31/18 0745)   Pulse   76 (07/31/18 1439)   Resp   18 (07/31/18 1439)     SpO2   92 % (07/31/18 1452)     Post Anesthesia Care and Evaluation    Patient location during evaluation: bedside  Patient participation: complete - patient participated  Level of consciousness: awake and alert  Pain management: adequate  Airway patency: patent  Anesthetic complications: No anesthetic complications  PONV Status: none  Cardiovascular status: acceptable  Respiratory status: acceptable  Hydration status: acceptable    Comments: /76 (BP Location: Left arm, Patient Position: Lying)   Pulse 76   Temp 37.1 °C (98.7 °F) (Oral)   Resp 18   Ht 177.8 cm (70\")   Wt 132 kg (290 lb 9.1 oz)   SpO2 92%   BMI 41.69 kg/m²         "

## 2018-07-31 NOTE — ANESTHESIA PREPROCEDURE EVALUATION
Anesthesia Evaluation     Patient summary reviewed and Nursing notes reviewed   NPO Solid Status: > 8 hours  NPO Liquid Status: > 4 hours           Airway   Mallampati: IV  TM distance: >3 FB  Neck ROM: limited  Difficult intubation highly probable, Small opening and Narrow palate  Dental - normal exam     Pulmonary - normal exam   (+) sleep apnea on CPAP,   Cardiovascular - normal exam    ECG reviewed  Patient on routine beta blocker and Beta blocker given within 24 hours of surgery    (+) hypertension 2 medications or greater, CAD, CABG,       Neuro/Psych  (+) psychiatric history Anxiety,     GI/Hepatic/Renal/Endo    (+)  GI bleeding, diabetes mellitus type 2 poorly controlled,     Musculoskeletal     Abdominal    Substance History      OB/GYN          Other                        Anesthesia Plan    ASA 3     MAC     Anesthetic plan and risks discussed with patient.

## 2018-08-01 ENCOUNTER — EPISODE CHANGES (OUTPATIENT)
Dept: CASE MANAGEMENT | Facility: OTHER | Age: 73
End: 2018-08-01

## 2018-08-01 VITALS
BODY MASS INDEX: 41.6 KG/M2 | WEIGHT: 290.57 LBS | HEIGHT: 70 IN | DIASTOLIC BLOOD PRESSURE: 83 MMHG | TEMPERATURE: 98.5 F | SYSTOLIC BLOOD PRESSURE: 146 MMHG | HEART RATE: 85 BPM | OXYGEN SATURATION: 90 % | RESPIRATION RATE: 20 BRPM

## 2018-08-01 PROBLEM — K92.2 LOWER GI BLEED: Status: ACTIVE | Noted: 2018-08-01

## 2018-08-01 LAB
ANION GAP SERPL CALCULATED.3IONS-SCNC: 14.2 MMOL/L
BUN BLD-MCNC: 14 MG/DL (ref 8–23)
BUN/CREAT SERPL: 10.2 (ref 7–25)
CALCIUM SPEC-SCNC: 9.2 MG/DL (ref 8.6–10.5)
CHLORIDE SERPL-SCNC: 102 MMOL/L (ref 98–107)
CO2 SERPL-SCNC: 23.8 MMOL/L (ref 22–29)
CREAT BLD-MCNC: 1.37 MG/DL (ref 0.76–1.27)
DEPRECATED RDW RBC AUTO: 47.8 FL (ref 37–54)
ERYTHROCYTE [DISTWIDTH] IN BLOOD BY AUTOMATED COUNT: 14.8 % (ref 11.5–14.5)
GFR SERPL CREATININE-BSD FRML MDRD: 51 ML/MIN/1.73
GLUCOSE BLD-MCNC: 110 MG/DL (ref 65–99)
GLUCOSE BLDC GLUCOMTR-MCNC: 124 MG/DL (ref 70–130)
GLUCOSE BLDC GLUCOMTR-MCNC: 191 MG/DL (ref 70–130)
HCT VFR BLD AUTO: 40.5 % (ref 40.4–52.2)
HGB BLD-MCNC: 12.4 G/DL (ref 13.7–17.6)
MCH RBC QN AUTO: 27 PG (ref 27–32.7)
MCHC RBC AUTO-ENTMCNC: 30.6 G/DL (ref 32.6–36.4)
MCV RBC AUTO: 88.2 FL (ref 79.8–96.2)
PLATELET # BLD AUTO: 194 10*3/MM3 (ref 140–500)
PMV BLD AUTO: 11.9 FL (ref 6–12)
POTASSIUM BLD-SCNC: 4.3 MMOL/L (ref 3.5–5.2)
RBC # BLD AUTO: 4.59 10*6/MM3 (ref 4.6–6)
SODIUM BLD-SCNC: 140 MMOL/L (ref 136–145)
WBC NRBC COR # BLD: 10.55 10*3/MM3 (ref 4.5–10.7)

## 2018-08-01 PROCEDURE — 63710000001 INSULIN ASPART PER 5 UNITS: Performed by: INTERNAL MEDICINE

## 2018-08-01 PROCEDURE — 80048 BASIC METABOLIC PNL TOTAL CA: CPT | Performed by: INTERNAL MEDICINE

## 2018-08-01 PROCEDURE — G0378 HOSPITAL OBSERVATION PER HR: HCPCS

## 2018-08-01 PROCEDURE — 85027 COMPLETE CBC AUTOMATED: CPT | Performed by: INTERNAL MEDICINE

## 2018-08-01 PROCEDURE — 82962 GLUCOSE BLOOD TEST: CPT

## 2018-08-01 PROCEDURE — 63710000001 INSULIN DETEMIR PER 5 UNITS: Performed by: INTERNAL MEDICINE

## 2018-08-01 PROCEDURE — 99213 OFFICE O/P EST LOW 20 MIN: CPT | Performed by: SURGERY

## 2018-08-01 RX ORDER — FENOFIBRATE 145 MG/1
145 TABLET, COATED ORAL DAILY
Qty: 30 TABLET | Refills: 0
Start: 2018-08-01 | End: 2019-12-06

## 2018-08-01 RX ORDER — ASPIRIN 81 MG/1
81 TABLET ORAL DAILY
Start: 2018-08-08

## 2018-08-01 RX ORDER — FUROSEMIDE 40 MG/1
40 TABLET ORAL DAILY
Start: 2018-08-03 | End: 2019-05-07 | Stop reason: SDUPTHER

## 2018-08-01 RX ADMIN — FENOFIBRATE 145 MG: 145 TABLET ORAL at 08:33

## 2018-08-01 RX ADMIN — VITAMIN D, TAB 1000IU (100/BT) 2000 UNITS: 25 TAB at 08:33

## 2018-08-01 RX ADMIN — ROSUVASTATIN CALCIUM 20 MG: 20 TABLET, FILM COATED ORAL at 08:33

## 2018-08-01 RX ADMIN — INSULIN DETEMIR 5 UNITS: 100 INJECTION, SOLUTION SUBCUTANEOUS at 06:55

## 2018-08-01 RX ADMIN — REPAGLINIDE 2 MG: 2 TABLET ORAL at 08:33

## 2018-08-01 RX ADMIN — INSULIN ASPART 2 UNITS: 100 INJECTION, SOLUTION INTRAVENOUS; SUBCUTANEOUS at 11:25

## 2018-08-01 RX ADMIN — ACETAMINOPHEN 650 MG: 325 TABLET, FILM COATED ORAL at 08:37

## 2018-08-01 RX ADMIN — METOPROLOL SUCCINATE 75 MG: 50 TABLET, FILM COATED, EXTENDED RELEASE ORAL at 08:33

## 2018-08-01 RX ADMIN — REPAGLINIDE 2 MG: 2 TABLET ORAL at 11:23

## 2018-08-01 RX ADMIN — ESCITALOPRAM 5 MG: 5 TABLET, FILM COATED ORAL at 08:33

## 2018-08-01 NOTE — PROGRESS NOTES
Continued Stay Note  [facility]     Patient Name: James Mckeon  MRN: [id number]  Today's Date: 8/1/2018    Admit Date: 7/29/2018          Discharge Plan     Row Name 08/01/18 0952       Plan    Plan Home    Plan Comments CCP followed up with patient and plan remains home with SO at discharge no needs. RASHEED Gates              Discharge Codes    No documentation.       Expected Discharge Date and Time     Expected Discharge Date Expected Discharge Time    Aug 1, 2018 11:00 AM            Anne Dobson RN

## 2018-08-01 NOTE — PROGRESS NOTES
"General Surgery  James Mckeon  1945 08/01/18    CC:  \"i'm better\"      Reason for consult/admit: lower gi bleed    HPI:  Has not had any bleeding since 2 days ago.  c scope yesterday with red tinged liquid in the descending colon, none in the transverse, with suspected diverticular bleed, since stopped spontaneously.  No active bleeding detected.  No abdominal pain. Tolerated po.  Ready for discharge.    He wishes to go to [place] for months at a time.  In general, does not have constipation    Diet:  Consistent carb    Temp:  [98.5 °F (36.9 °C)-99 °F (37.2 °C)] 98.5 °F (36.9 °C)  Heart Rate:  [70-97] 85  Resp:  [18-20] 20  BP: (125-167)/(49-91) 146/83    Intake & Output (last day)       07/31 0701 - 08/01 0700 08/01 0701 - 08/02 0700    P.O. 120     I.V. (mL/kg) 1002.5 (7.6)     Total Intake(mL/kg) 1122.5 (8.5)     Net +1122.5                Physical Exam   Constitutional: He is oriented to person, place, and time. He appears well-nourished.   HENT:   Head: Normocephalic and atraumatic.   Eyes: No scleral icterus.   Cardiovascular: Normal rate.    Pulmonary/Chest: Effort normal. No stridor. No respiratory distress. He has no wheezes.   Abdominal: Soft. He exhibits distension. There is no tenderness.   Neurological: He is alert and oriented to person, place, and time.   Skin: Skin is warm and dry.   Psychiatric: He has a normal mood and affect.   Vitals reviewed.      Results from last 7 days  Lab Units 08/01/18  0431   WBC 10*3/mm3 10.55   HEMOGLOBIN g/dL 12.4*   HEMATOCRIT % 40.5   PLATELETS 10*3/mm3 194       Assessment:  · Lower GI bleed, suspected diverticular descending colon his only thinning agent being aspirin.  Tagged bleeding scan was negative.  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  Ultrasound " recommended  · Diabetes with hemoglobin A1c 7.4, on 2J0, mucosa, Starlix, insulin  · Hyperlipidemia on Crestor  · Anxiety on Lexapro  · Chronic kidney disease stage III  · Coronary artery disease, status post coronary artery bypass graft  · Sleep apnea  · Desire to go to [place]    Plan  · We discussed the need for him to go I have told him to indicate to the emergency room physicians that they can access my operative note, which indicates the patient will need a repeat bleeding scan if he bleeds again.  We talked about the fact that 80% of individuals who have their first diverticular bleed stopped spontaneously, but of the 20% that rebleed, the cessation rate is dramatically lower, thus meaning that the patient would be much more likely to require surgery or an intervention if he rebleeds.  · Thus if he rebleeds, to the ER for a stat bleeding scan, followed by colonoscopy with attempted clipping or injection with saline, and if unsuccessful, consideration of surgery, which at present would be a descending colectomy.  Hopefully he could preserve his transverse colon, via the middle colic vessel.  · Copy of operative note to be given to patient, so that he can take that with him to [place].  If he stops bleeding there, would recommend that he come immediately to the level so that he can receive the rest of his care here, his old records being here.  I placed an order to the nurse to make a copy of the operative note for the patient.      Olimpia Blanc MD  08/01/18  11:27 AM

## 2018-08-01 NOTE — PROGRESS NOTES
Case Management Discharge Note    Final Note: Discharged home with SO.    Destination     No service has been selected for the patient.      Durable Medical Equipment     No service has been selected for the patient.      Dialysis/Infusion     No service has been selected for the patient.      Home Medical Care     No service has been selected for the patient.      Social Care     No service has been selected for the patient.        Other:  (private auto)    Final Discharge Disposition Code: 01 - home or self-care

## 2018-08-01 NOTE — DISCHARGE SUMMARY
Date of Admission: 7/29/2018  Date of Discharge:  8/1/2018  Primary Care Physician: Radha Haji MD     Discharge Diagnosis:  Active Hospital Problems    Diagnosis Date Noted   • **LGI bleed [K92.2] 07/30/2018   • Uncontrolled type 2 diabetes mellitus (CMS/HCC) [E11.65] 05/26/2016   • Obstructive sleep apnea syndrome [G47.33] 02/25/2016   • Chronic kidney disease, stage III (moderate) [N18.3] 02/25/2016   • Benign essential hypertension [I10] 02/25/2016      Resolved Hospital Problems    Diagnosis Date Noted Date Resolved   No resolved problems to display.       Presenting Problem/History of Present Illness:  LGI bleed [K92.2]  Lower GI bleed [K92.2]     Hospital Course:  The patient is a 73 y.o. man who presented with rectal bleeding.  This was suspected to be diverticular.  He had a history of right colon resection for an unresectable polyp bordering on in situ cancer.  He underwent colonoscopy on 7/31/18 which showed diverticulosis without evidence of a singular bleeding diverticulum.  There was blood tinged liquid in the colon; large nonbleeding hemorrhoids were present.  He was started on a diet yesterday afternoon.  He has had no further bleeding.  If he rebleeds, further consideration will need to be given for surgery.  He may restart aspirin in a week.     Regarding his diabetes, he has only gotten 5 units of Levemir daily with very small amounts of sliding scale NovoLog.  He may restart Victoza and Starlix.  Hold on Toujeo at home for now.  He will need to call his endocrinologist if and when Accu-Cheks consistently greater than 200.    Renal function has been stable.  Lasix and Cozaar have been on hold.  He can restart the Lasix in 2 or 3 days.  Cozaar can be restarted today.  Follow-up with PCP for blood pressure and electrolytes.    Stable condition; good prognosis    Exam Today:  Feels fine.  Bowels have not moved since before the colonoscopy.  Has had flatus.  Tolerating regular diet.  Heart is  regular with a grade 2-3/6 early systolic murmur left upper sternal border.  Lungs clear.  Abdomen soft and nontender.  Extremities trace to 1+ edema    Procedures Performed:  Procedure(s):  COLONOSCOPY into cecum and TI  7/31/18     Ct Abdomen Pelvis Without Contrast    Result Date: 7/29/2018  1.  No definite acute abdominal pathology, no obstructive uropathy or inflammatory bowel disease. Postcholecystectomy. Right nephrectomy. 2.  New exophytic 2 cm hyperdense lesion of the left kidney may be further evaluated by ultrasound to exclude neoplasm. 3.  A previously noted 1.7 x 1.9 cm cyst of the left kidney now measures 3 x 3.4 cm. 4.  Diverticulosis of the colon, no evidence for diverticulitis.       Nm Gi Blood Loss    Result Date: 7/30/2018  1. Exam is normal at one hour. 2. Follow-up emergency imaging (without additional radionuclide) is recommended if patient rebleeds within the next 24 hours.  This report was finalized on 7/30/2018 5:22 PM by Dr. Dax Jacobs M.D.        Labs:  Lab Results   Component Value Date    WBC 10.55 08/01/2018    HGB 12.4 (L) 08/01/2018    HCT 40.5 08/01/2018     08/01/2018     Lab Results   Component Value Date     08/01/2018    K 4.3 08/01/2018     08/01/2018    CO2 23.8 08/01/2018    BUN 14 08/01/2018    CREATININE 1.37 (H) 08/01/2018    GLUCOSE 110 (H) 08/01/2018     Lab Results   Component Value Date    AST 18 07/29/2018    ALT 33 07/29/2018    ALKPHOS 35 (L) 07/29/2018     Lab Results   Component Value Date    INR 1.00 07/29/2018     A1c 7.4       Consults:   Dr. Blanc      Discharge Disposition:  Home or Self Care    Discharge Medications:     Discharge Medications      Changes to Medications      Instructions Start Date   aspirin 81 MG EC tablet  What changed:  These instructions start on 8/8/2018. If you are unsure what to do until then, ask your doctor or other care provider.   81 mg, Oral, Daily   Start Date:  8/8/2018     fenofibrate 145 MG  "tablet  Commonly known as:  TRICOR  What changed:  · See the new instructions.  · Another medication with the same name was removed. Continue taking this medication, and follow the directions you see here.   145 mg, Oral, Daily      furosemide 40 MG tablet  Commonly known as:  LASIX  What changed:  These instructions start on 8/3/2018. If you are unsure what to do until then, ask your doctor or other care provider.   40 mg, Oral, Daily   Start Date:  8/3/2018        Continue These Medications      Instructions Start Date   escitalopram 5 MG tablet  Commonly known as:  LEXAPRO   5 mg, Oral, Daily      glucose blood test strip   Test 4 times daily       Insulin Pen Needle 31G X 8 MM misc   Use to inject 2 times daily      losartan 25 MG tablet  Commonly known as:  COZAAR   25 mg, Oral, Daily      LUMIGAN 0.01 % ophthalmic drops  Generic drug:  bimatoprost   INSTILL 1 DROP IN OU QHS      metoprolol succinate XL 50 MG 24 hr tablet  Commonly known as:  TOPROL-XL   1.5 tablets, Oral, Every 12 Hours      nateglinide 120 MG tablet  Commonly known as:  STARLIX   120 mg, Oral, 3 Times Daily Before Meals      pramipexole 0.25 MG tablet  Commonly known as:  MIRAPEX   0.25 mg, Oral, Daily      rosuvastatin 20 MG tablet  Commonly known as:  CRESTOR   20 mg, Oral, Daily      Syringe 23G X 1\" 3 ML misc   Use as directed for testosterone injection      Testosterone Cypionate 200 MG/ML injection  Commonly known as:  DEPOTESTOTERONE CYPIONATE   INJECT 1 ML INTO THE SHOULDER, THIGH, OR BUTTOCKS EVERY 14 DAYS      VICTOZA 18 MG/3ML solution pen-injector injection  Generic drug:  Liraglutide   1.8 mg, Subcutaneous, Daily      VITAMIN D PO   2,000 Units, Oral, Daily         Stop These Medications    FISH OIL PO     potassium chloride 10 MEQ CR capsule  Commonly known as:  MICRO-K     TOUJEO SOLOSTAR 300 UNIT/ML solution pen-injector  Generic drug:  Insulin Glargine            Discharge Diet:   Diet Instructions     Diet: Consistent " Carbohydrate, Cardiac       Discharge Diet:   Consistent Carbohydrate  Cardiac             Activity at Discharge:   Activity Instructions     Activity as Tolerated             Follow-up Appointments:  Future Appointments  Date Time Provider Department Center   11/6/2018 8:40 AM [facility] None   11/13/2018 10:00 AM Radha Haji MD [facility] None   12/21/2018 9:00 AM [facility] None   1/4/2019 2:00 PM Meri Ocasio MD [facility] None   2/15/2019 9:00 AM Humberto Barone MD [facility] None     Follow-up Information     Radha Haji MD Follow up in 1 week(s).    Specialty:  Internal Medicine  Why:  htn, gi bleed, CKD  Contact information:  [address]  [facility] [address]  [place] [address]  [phone]             [facility] Follow up.    Specialty:  Endocrinology  Why:  call office for dosing on trujeo if accuchecks consistently > 200.  Contact information:  [address]  [facility] [address]  [place] [address]  [phone]                     Test Results Pending at Discharge: none       Nell Castro MD  08/01/18  8:46 AM    Time Spent on Discharge Activities: 35 min

## 2018-08-01 NOTE — PLAN OF CARE
Problem: Patient Care Overview  Goal: Plan of Care Review  Outcome: Ongoing (interventions implemented as appropriate)   08/01/18 0616   Coping/Psychosocial   Plan of Care Reviewed With patient   Plan of Care Review   Progress improving   OTHER   Outcome Summary no reports or c/o bleeding overnight. Ambulating well. c/o headache at beginning of shift, otherwise no discomfort. VSS. Tolerated Full Liquid diet well. Will continue to monitor.        Problem: Fall Risk (Adult)  Goal: Absence of Fall  Outcome: Ongoing (interventions implemented as appropriate)      Problem: Gastrointestinal Bleeding (Adult)  Goal: Signs and Symptoms of Listed Potential Problems Will be Absent, Minimized or Managed (Gastrointestinal Bleeding)  Outcome: Ongoing (interventions implemented as appropriate)

## 2018-08-02 ENCOUNTER — READMISSION MANAGEMENT (OUTPATIENT)
Dept: CALL CENTER | Facility: HOSPITAL | Age: 73
End: 2018-08-02

## 2018-08-03 ENCOUNTER — READMISSION MANAGEMENT (OUTPATIENT)
Dept: CALL CENTER | Facility: HOSPITAL | Age: 73
End: 2018-08-03

## 2018-08-03 NOTE — OUTREACH NOTE
Medical Week 1 Survey      Responses   Facility patient discharged from?  [facility]   Does the patient have one of the following disease processes/diagnoses(primary or secondary)?  Other   Is there a successful TCM telephone encounter documented?  No   Week 1 attempt successful?  Yes   Call start time  1036   Call end time  1048   Discharge diagnosis  Last attending • Treatment team LGI bleed    Meds reviewed with patient/caregiver?  Yes   Is the patient having any side effects they believe may be caused by any medication additions or changes?  No   Does the patient have all medications ordered at discharge?  Yes   Is the patient taking all medications as directed (includes completed medication regime)?  No   What is preventing the patient from taking all medications as directed?  Desires to consult PCP first   Nursing Interventions  Nurse provided patient education   Medication comments  Doesnt want to stop Toujeo. Advised pt to call his endocrinology APRN..   Does the patient have a primary care provider?   Yes   Has the patient kept scheduled appointments due by today?  N/A   Comments  Advised pt to see PCP and Endocrinology.   Has home health visited the patient within 72 hours of discharge?  N/A   Psychosocial issues?  No   Did the patient receive a copy of their discharge instructions?  Yes   Nursing interventions  Reviewed instructions with patient   What is the patient's perception of their health status since discharge?  Improving   Is the patient/caregiver able to teach back signs and symptoms related to disease process for when to call PCP?  Yes   Is the patient/caregiver able to teach back signs and symptoms related to disease process for when to call 911?  Yes   Is the patient/caregiver able to teach back the hierarchy of who to call/visit for symptoms/problems? PCP, Specialist, Home health nurse, Urgent Care, ED, 911  No   Additional teach back comments  Encouraged pt that he discuss his insulin  regimen with his providers.   Week 1 call completed?  Yes          Edgar Engle RN

## 2018-08-10 ENCOUNTER — READMISSION MANAGEMENT (OUTPATIENT)
Dept: CALL CENTER | Facility: HOSPITAL | Age: 73
End: 2018-08-10

## 2018-08-10 NOTE — OUTREACH NOTE
Medical Week 2 Survey      Responses   Facility patient discharged from?  [facility]   Does the patient have one of the following disease processes/diagnoses(primary or secondary)?  Other   Week 2 attempt successful?  No   Unsuccessful attempts  Attempt 1          Sharad Herman RN

## 2018-08-13 ENCOUNTER — READMISSION MANAGEMENT (OUTPATIENT)
Dept: CALL CENTER | Facility: HOSPITAL | Age: 73
End: 2018-08-13

## 2018-08-13 NOTE — OUTREACH NOTE
Medical Week 2 Survey      Responses   Facility patient discharged from?  [place]   Does the patient have one of the following disease processes/diagnoses(primary or secondary)?  Other   Week 2 attempt successful?  Yes   Call start time  1337   Rescheduled  Rescheduled-pt requested   Wrap up additional comments  Pt was having Lunch. He couldnt speak now.          Edgar Engle RN

## 2018-08-14 RX ORDER — ESCITALOPRAM OXALATE 5 MG/1
5 TABLET ORAL DAILY
Qty: 90 TABLET | Refills: 0 | Status: SHIPPED | OUTPATIENT
Start: 2018-08-14 | End: 2018-11-13 | Stop reason: SDUPTHER

## 2018-08-17 ENCOUNTER — READMISSION MANAGEMENT (OUTPATIENT)
Dept: CALL CENTER | Facility: HOSPITAL | Age: 73
End: 2018-08-17

## 2018-08-17 NOTE — OUTREACH NOTE
"Medical Week 2 Survey      Responses   Facility patient discharged from?  [facility]   Does the patient have one of the following disease processes/diagnoses(primary or secondary)?  Other   Week 2 attempt successful?  Yes   Call start time  0953   Call end time  0955   Meds reviewed with patient/caregiver?  Yes   Is the patient having any side effects they believe may be caused by any medication additions or changes?  No   Does the patient have all medications ordered at discharge?  Yes   Is the patient taking all medications as directed (includes completed medication regime)?  No   What is preventing the patient from taking all medications as directed?  Desires to consult PCP first   Nursing Interventions  Nurse provided patient education   Medication comments  Doesnt want to stop Toujeo. Advised pt to call his endocrinology APRN..   Does the patient have a primary care provider?   Yes   Comments regarding PCP  Reports has PCP appt, but did not provide the date and time.    Comments  Advised to keep appt with PCP    Has home health visited the patient within 72 hours of discharge?  Unsure   Psychosocial issues?  No   Did the patient receive a copy of their discharge instructions?  Yes   Nursing interventions  Reviewed instructions with patient   What is the patient's perception of their health status since discharge?  Improving   Is the patient/caregiver able to teach back signs and symptoms related to disease process for when to call PCP?  Yes   Is the patient/caregiver able to teach back signs and symptoms related to disease process for when to call 911?  Yes   Is the patient/caregiver able to teach back the hierarchy of who to call/visit for symptoms/problems? PCP, Specialist, Home health nurse, Urgent Care, ED, 911  No [Pt states \"I\"m fine, and know when to go in.\" ]   Additional teach back comments  Reports his blood sugar this AM was 135   Week 2 Call Completed?  Yes          Sharad Herman RN  "

## 2018-08-28 ENCOUNTER — READMISSION MANAGEMENT (OUTPATIENT)
Dept: CALL CENTER | Facility: HOSPITAL | Age: 73
End: 2018-08-28

## 2018-08-28 NOTE — OUTREACH NOTE
Medical Week 3 Survey      Responses   Facility patient discharged from?  [facility]   Does the patient have one of the following disease processes/diagnoses(primary or secondary)?  Other   Week 3 attempt successful?  No   Unsuccessful attempts  Attempt 1          Anne Leonard RN

## 2018-08-31 ENCOUNTER — READMISSION MANAGEMENT (OUTPATIENT)
Dept: CALL CENTER | Facility: HOSPITAL | Age: 73
End: 2018-08-31

## 2018-09-11 ENCOUNTER — READMISSION MANAGEMENT (OUTPATIENT)
Dept: CALL CENTER | Facility: HOSPITAL | Age: 73
End: 2018-09-11

## 2018-09-11 NOTE — OUTREACH NOTE
Medical Week 4 Survey      Responses   Facility patient discharged from?  [place]   Does the patient have one of the following disease processes/diagnoses(primary or secondary)?  Other   Week 4 attempt successful?  No          Yue Giron RN

## 2018-09-12 ENCOUNTER — OFFICE VISIT (OUTPATIENT)
Dept: INTERNAL MEDICINE | Facility: CLINIC | Age: 73
End: 2018-09-12

## 2018-09-12 ENCOUNTER — EPISODE CHANGES (OUTPATIENT)
Dept: CASE MANAGEMENT | Facility: OTHER | Age: 73
End: 2018-09-12

## 2018-09-12 VITALS
HEART RATE: 89 BPM | BODY MASS INDEX: 40.09 KG/M2 | DIASTOLIC BLOOD PRESSURE: 80 MMHG | WEIGHT: 280 LBS | HEIGHT: 70 IN | TEMPERATURE: 98.6 F | SYSTOLIC BLOOD PRESSURE: 146 MMHG | OXYGEN SATURATION: 95 %

## 2018-09-12 DIAGNOSIS — D50.0 IRON DEFICIENCY ANEMIA DUE TO CHRONIC BLOOD LOSS: ICD-10-CM

## 2018-09-12 DIAGNOSIS — K57.91 DIVERTICULOSIS OF INTESTINE WITH BLEEDING, UNSPECIFIED INTESTINAL TRACT LOCATION: Primary | ICD-10-CM

## 2018-09-12 LAB
ALBUMIN SERPL-MCNC: 4.5 G/DL (ref 3.5–5.2)
ALBUMIN/GLOB SERPL: 1.7 G/DL
ALP SERPL-CCNC: 38 U/L (ref 39–117)
ALT SERPL-CCNC: 33 U/L (ref 1–41)
AST SERPL-CCNC: 18 U/L (ref 1–40)
BASOPHILS # BLD AUTO: 0.08 10*3/MM3 (ref 0–0.2)
BASOPHILS NFR BLD AUTO: 0.9 % (ref 0–1.5)
BILIRUB SERPL-MCNC: 0.3 MG/DL (ref 0.1–1.2)
BUN SERPL-MCNC: 31 MG/DL (ref 8–23)
BUN/CREAT SERPL: 17.6 (ref 7–25)
CALCIUM SERPL-MCNC: 10.1 MG/DL (ref 8.6–10.5)
CHLORIDE SERPL-SCNC: 103 MMOL/L (ref 98–107)
CO2 SERPL-SCNC: 29.3 MMOL/L (ref 22–29)
CREAT SERPL-MCNC: 1.76 MG/DL (ref 0.76–1.27)
EOSINOPHIL # BLD AUTO: 0.29 10*3/MM3 (ref 0–0.7)
EOSINOPHIL NFR BLD AUTO: 3.4 % (ref 0.3–6.2)
ERYTHROCYTE [DISTWIDTH] IN BLOOD BY AUTOMATED COUNT: 15.7 % (ref 11.5–14.5)
GLOBULIN SER CALC-MCNC: 2.7 GM/DL
GLUCOSE SERPL-MCNC: 164 MG/DL (ref 65–99)
HCT VFR BLD AUTO: 46.5 % (ref 40.4–52.2)
HGB BLD-MCNC: 14.2 G/DL (ref 13.7–17.6)
IMM GRANULOCYTES # BLD: 0.05 10*3/MM3 (ref 0–0.03)
IMM GRANULOCYTES NFR BLD: 0.6 % (ref 0–0.5)
LYMPHOCYTES # BLD AUTO: 1.89 10*3/MM3 (ref 0.9–4.8)
LYMPHOCYTES NFR BLD AUTO: 21.9 % (ref 19.6–45.3)
MCH RBC QN AUTO: 25.9 PG (ref 27–32.7)
MCHC RBC AUTO-ENTMCNC: 30.5 G/DL (ref 32.6–36.4)
MCV RBC AUTO: 84.9 FL (ref 79.8–96.2)
MONOCYTES # BLD AUTO: 0.61 10*3/MM3 (ref 0.2–1.2)
MONOCYTES NFR BLD AUTO: 7.1 % (ref 5–12)
NEUTROPHILS # BLD AUTO: 5.76 10*3/MM3 (ref 1.9–8.1)
NEUTROPHILS NFR BLD AUTO: 66.7 % (ref 42.7–76)
PLATELET # BLD AUTO: 212 10*3/MM3 (ref 140–500)
POTASSIUM SERPL-SCNC: 4.5 MMOL/L (ref 3.5–5.2)
PROT SERPL-MCNC: 7.2 G/DL (ref 6–8.5)
RBC # BLD AUTO: 5.48 10*6/MM3 (ref 4.6–6)
SODIUM SERPL-SCNC: 144 MMOL/L (ref 136–145)
WBC # BLD AUTO: 8.63 10*3/MM3 (ref 4.5–10.7)

## 2018-09-12 PROCEDURE — G0008 ADMIN INFLUENZA VIRUS VAC: HCPCS | Performed by: INTERNAL MEDICINE

## 2018-09-12 PROCEDURE — 90662 IIV NO PRSV INCREASED AG IM: CPT | Performed by: INTERNAL MEDICINE

## 2018-09-12 PROCEDURE — 99213 OFFICE O/P EST LOW 20 MIN: CPT | Performed by: INTERNAL MEDICINE

## 2018-09-12 NOTE — PROGRESS NOTES
Subjective     James Mckeon is a 73 y.o. male who presents with   Chief Complaint   Patient presents with   • GI Bleeding     follow up        History of Present Illness     Patient recently admitted last month for diverticular bleed.  I reviewed the following d/c summary with him.      The patient is a 73 y.o. man who presented with rectal bleeding.  This was suspected to be diverticular.  He had a history of right colon resection for an unresectable polyp bordering on in situ cancer.  He underwent colonoscopy on 7/31/18 which showed diverticulosis without evidence of a singular bleeding diverticulum.  There was blood tinged liquid in the colon; large nonbleeding hemorrhoids were present.  He was started on a diet yesterday afternoon.  He has had no further bleeding.  If he rebleeds, further consideration will need to be given for surgery.  He may restart aspirin in a week.     He has had no further bleeding.  He is overall feeling well.  He is back on aspirin.  Found to be mildly anemic during hospital stay at 12.     Review of Systems   Respiratory: Negative.    Cardiovascular: Negative.        The following portions of the patient's history were reviewed and updated as appropriate: allergies, current medications and problem list.    Patient Active Problem List    Diagnosis Date Noted   • Lower GI bleed 08/01/2018   • LGI bleed 07/30/2018   • History of colon cancer 12/20/2017     Note Last Updated: 12/20/2017     Added automatically from request for surgery [id number]     • Colon cancer screening 12/20/2017     Note Last Updated: 12/20/2017     Added automatically from request for surgery [id number]     • History of colon polyps 12/20/2017     Note Last Updated: 1/29/2018     Severe atypia, bordering on carcinoma in situ, s/p resection right colon 2006     • Vitamin D deficiency 02/16/2017   • Uncontrolled type 2 diabetes mellitus (CMS/HCC) 05/26/2016   • Coronary arteriosclerosis in native artery 03/29/2016   •  "Anxiety 02/25/2016   • Asymptomatic coronary heart disease 02/25/2016   • Benign essential hypertension 02/25/2016   • Benign prostatic hyperplasia 02/25/2016   • Chronic kidney disease, stage III (moderate) 02/25/2016   • Hyperlipidemia 02/25/2016   • Testosterone deficiency 02/25/2016   • Obstructive sleep apnea syndrome 02/25/2016   • History of coronary artery bypass surgery 05/14/2013       Current Outpatient Prescriptions on File Prior to Visit   Medication Sig Dispense Refill   • aspirin 81 MG EC tablet Take 1 tablet by mouth Daily.     • Cholecalciferol (VITAMIN D PO) Take 2,000 Units by mouth Daily.     • escitalopram (LEXAPRO) 5 MG tablet TAKE 1 TABLET BY MOUTH DAILY 90 tablet 0   • fenofibrate (TRICOR) 145 MG tablet Take 1 tablet by mouth Daily. 30 tablet 0   • furosemide (LASIX) 40 MG tablet Take 1 tablet by mouth Daily.     • glucose blood test strip Test 4 times daily 200 each 3   • Insulin Pen Needle 31G X 8 MM misc Use to inject 2 times daily 100 each 5   • losartan (COZAAR) 25 MG tablet Take 25 mg by mouth daily.     • metoprolol succinate XL (TOPROL-XL) 50 MG 24 hr tablet Take 1.5 tablets by mouth every 12 (twelve) hours.     • nateglinide (STARLIX) 120 MG tablet Take 1 tablet by mouth 3 (Three) Times a Day Before Meals. 90 tablet 1   • pramipexole (MIRAPEX) 0.25 MG tablet Take 0.25 mg by mouth daily.     • rosuvastatin (CRESTOR) 20 MG tablet Take 20 mg by mouth daily.     • Syringe 23G X 1\" 3 ML misc Use as directed for testosterone injection 6 each 1   • Testosterone Cypionate (DEPOTESTOTERONE CYPIONATE) 200 MG/ML injection INJECT 1 ML INTO THE SHOULDER, THIGH, OR BUTTOCKS EVERY 14 DAYS 2 mL 0   • VICTOZA 18 MG/3ML solution pen-injector injection Inject 1.8 mg under the skin Daily. 9 pen 1   • LUMIGAN 0.01 % ophthalmic drops INSTILL 1 DROP IN OU QHS  5     No current facility-administered medications on file prior to visit.        Objective     /80 (BP Location: Left arm, Patient Position: " "Sitting, Cuff Size: Adult)   Pulse 89   Temp 98.6 °F (37 °C) (Oral)   Ht 177.8 cm (70\")   Wt 127 kg (280 lb)   SpO2 95%   BMI 40.18 kg/m²     Physical Exam   Constitutional: He is oriented to person, place, and time. He appears well-developed and well-nourished.   HENT:   Head: Atraumatic.   Neurological: He is alert and oriented to person, place, and time.   Psychiatric: He has a normal mood and affect.       Assessment/Plan   James was seen today for gi bleeding.    Diagnoses and all orders for this visit:    Diverticulosis of intestine with bleeding, unspecified intestinal tract location  -     CBC & Differential  -     Comprehensive Metabolic Panel    Iron deficiency anemia due to chronic blood loss    Other orders  -     Fluzone High Dose =>65Years        Discussion  Patient presents in f/u of diverticular bleed resulting in anemia.  He is doing well with no further bleeding.  Check labs to f/u on anemia.  He will let me know any further bleeding. 10/15 minutes was spent in counseling of the following topics:, test results, impressions         Future Appointments  Date Time Provider Department Center   11/6/2018 8:40 AM [facility] None   11/13/2018 10:00 AM Radha Haji MD [facility] None   12/21/2018 9:00 AM [facility] None   1/4/2019 2:00 PM Meri Ocasio MD [facility] None   2/15/2019 9:00 AM Humberto Barone MD [facility] None         "

## 2018-09-13 RX ORDER — LIRAGLUTIDE 6 MG/ML
INJECTION SUBCUTANEOUS
Qty: 9 ML | Refills: 1 | Status: SHIPPED | OUTPATIENT
Start: 2018-09-13 | End: 2018-11-12 | Stop reason: SDUPTHER

## 2018-09-17 ENCOUNTER — LAB (OUTPATIENT)
Dept: INTERNAL MEDICINE | Facility: CLINIC | Age: 73
End: 2018-09-17

## 2018-09-17 PROCEDURE — 90471 IMMUNIZATION ADMIN: CPT | Performed by: INTERNAL MEDICINE

## 2018-09-17 PROCEDURE — 90632 HEPA VACCINE ADULT IM: CPT | Performed by: INTERNAL MEDICINE

## 2018-10-09 ENCOUNTER — EPISODE CHANGES (OUTPATIENT)
Dept: CASE MANAGEMENT | Facility: OTHER | Age: 73
End: 2018-10-09

## 2018-10-17 ENCOUNTER — TELEPHONE (OUTPATIENT)
Dept: ENDOCRINOLOGY | Age: 73
End: 2018-10-17

## 2018-10-17 NOTE — TELEPHONE ENCOUNTER
----- Message from Blake Gregg sent at 10/17/2018 12:56 PM EDT -----  Contact: Patient  [facility] [id number] [place] [address] [address] - [phone]  - [phone] (Phone)  [phone] (Fax)    Called in regards to get a verbal approval for an early refill on the patient Testosterone Cypionate (DEPOTESTOTERONE CYPIONATE) 200 MG/ML injection. Patient was last seen in may and has upcoming appt in [place]        Medication was just refilled on 10/7/2018, pt will need to wait for refill

## 2018-11-05 DIAGNOSIS — Z12.5 SCREENING PSA (PROSTATE SPECIFIC ANTIGEN): ICD-10-CM

## 2018-11-05 DIAGNOSIS — E78.5 HYPERLIPIDEMIA, UNSPECIFIED HYPERLIPIDEMIA TYPE: Primary | ICD-10-CM

## 2018-11-05 DIAGNOSIS — E55.9 VITAMIN D DEFICIENCY: ICD-10-CM

## 2018-11-05 DIAGNOSIS — I10 HYPERTENSION, UNSPECIFIED TYPE: ICD-10-CM

## 2018-11-06 LAB
25(OH)D3+25(OH)D2 SERPL-MCNC: 47.1 NG/ML (ref 30–100)
ALBUMIN SERPL-MCNC: 4.2 G/DL (ref 3.5–5.2)
ALBUMIN/GLOB SERPL: 1.4 G/DL
ALP SERPL-CCNC: 39 U/L (ref 39–117)
ALT SERPL-CCNC: 40 U/L (ref 1–41)
APPEARANCE UR: CLEAR
AST SERPL-CCNC: 17 U/L (ref 1–40)
BACTERIA #/AREA URNS HPF: NORMAL /HPF
BASOPHILS # BLD AUTO: 0.06 10*3/MM3 (ref 0–0.2)
BASOPHILS NFR BLD AUTO: 0.7 % (ref 0–1.5)
BILIRUB SERPL-MCNC: 0.3 MG/DL (ref 0.1–1.2)
BILIRUB UR QL STRIP: NEGATIVE
BUN SERPL-MCNC: 39 MG/DL (ref 8–23)
BUN/CREAT SERPL: 23.1 (ref 7–25)
CALCIUM SERPL-MCNC: 10.5 MG/DL (ref 8.6–10.5)
CASTS URNS MICRO: NORMAL
CHLORIDE SERPL-SCNC: 101 MMOL/L (ref 98–107)
CHOLEST SERPL-MCNC: 141 MG/DL (ref 0–200)
CO2 SERPL-SCNC: 27.8 MMOL/L (ref 22–29)
COLOR UR: YELLOW
CREAT SERPL-MCNC: 1.69 MG/DL (ref 0.76–1.27)
EOSINOPHIL # BLD AUTO: 0.33 10*3/MM3 (ref 0–0.7)
EOSINOPHIL NFR BLD AUTO: 3.8 % (ref 0.3–6.2)
EPI CELLS #/AREA URNS HPF: NORMAL /HPF
ERYTHROCYTE [DISTWIDTH] IN BLOOD BY AUTOMATED COUNT: 16.9 % (ref 11.5–14.5)
GLOBULIN SER CALC-MCNC: 3.1 GM/DL
GLUCOSE SERPL-MCNC: 147 MG/DL (ref 65–99)
GLUCOSE UR QL: NEGATIVE
HCT VFR BLD AUTO: 50.8 % (ref 40.4–52.2)
HDLC SERPL-MCNC: 40 MG/DL (ref 40–60)
HGB BLD-MCNC: 15.4 G/DL (ref 13.7–17.6)
HGB UR QL STRIP: NEGATIVE
IMM GRANULOCYTES # BLD: 0.06 10*3/MM3 (ref 0–0.03)
IMM GRANULOCYTES NFR BLD: 0.7 % (ref 0–0.5)
KETONES UR QL STRIP: NEGATIVE
LDLC SERPL CALC-MCNC: 55 MG/DL (ref 0–100)
LEUKOCYTE ESTERASE UR QL STRIP: NEGATIVE
LYMPHOCYTES # BLD AUTO: 2.02 10*3/MM3 (ref 0.9–4.8)
LYMPHOCYTES NFR BLD AUTO: 23.5 % (ref 19.6–45.3)
MCH RBC QN AUTO: 25.1 PG (ref 27–32.7)
MCHC RBC AUTO-ENTMCNC: 30.3 G/DL (ref 32.6–36.4)
MCV RBC AUTO: 82.9 FL (ref 79.8–96.2)
MONOCYTES # BLD AUTO: 0.84 10*3/MM3 (ref 0.2–1.2)
MONOCYTES NFR BLD AUTO: 9.8 % (ref 5–12)
NEUTROPHILS # BLD AUTO: 5.35 10*3/MM3 (ref 1.9–8.1)
NEUTROPHILS NFR BLD AUTO: 62.2 % (ref 42.7–76)
NITRITE UR QL STRIP: NEGATIVE
NRBC BLD AUTO-RTO: 0 /100 WBC (ref 0–0)
PH UR STRIP: 5.5 [PH] (ref 5–8)
PLATELET # BLD AUTO: 178 10*3/MM3 (ref 140–500)
POTASSIUM SERPL-SCNC: 4.8 MMOL/L (ref 3.5–5.2)
PROT SERPL-MCNC: 7.3 G/DL (ref 6–8.5)
PROT UR QL STRIP: NEGATIVE
PSA SERPL-MCNC: 1.61 NG/ML (ref 0–4)
RBC # BLD AUTO: 6.13 10*6/MM3 (ref 4.6–6)
RBC #/AREA URNS HPF: NORMAL /HPF
SODIUM SERPL-SCNC: 141 MMOL/L (ref 136–145)
SP GR UR: 1.02 (ref 1–1.03)
TRIGL SERPL-MCNC: 232 MG/DL (ref 0–150)
TSH SERPL DL<=0.005 MIU/L-ACNC: 2.49 MIU/ML (ref 0.27–4.2)
UROBILINOGEN UR STRIP-MCNC: (no result) MG/DL
VLDLC SERPL CALC-MCNC: 46.4 MG/DL (ref 5–40)
WBC # BLD AUTO: 8.6 10*3/MM3 (ref 4.5–10.7)
WBC #/AREA URNS HPF: NORMAL /HPF

## 2018-11-13 ENCOUNTER — OFFICE VISIT (OUTPATIENT)
Dept: INTERNAL MEDICINE | Facility: CLINIC | Age: 73
End: 2018-11-13

## 2018-11-13 VITALS
WEIGHT: 287 LBS | HEART RATE: 85 BPM | HEIGHT: 70 IN | DIASTOLIC BLOOD PRESSURE: 76 MMHG | OXYGEN SATURATION: 94 % | BODY MASS INDEX: 41.09 KG/M2 | SYSTOLIC BLOOD PRESSURE: 144 MMHG

## 2018-11-13 DIAGNOSIS — E78.5 HYPERLIPIDEMIA, UNSPECIFIED HYPERLIPIDEMIA TYPE: ICD-10-CM

## 2018-11-13 DIAGNOSIS — Z00.00 MEDICARE ANNUAL WELLNESS VISIT, SUBSEQUENT: Primary | ICD-10-CM

## 2018-11-13 DIAGNOSIS — E11.9 TYPE 2 DIABETES MELLITUS WITHOUT COMPLICATION, UNSPECIFIED WHETHER LONG TERM INSULIN USE (HCC): ICD-10-CM

## 2018-11-13 DIAGNOSIS — N52.9 ERECTILE DYSFUNCTION, UNSPECIFIED ERECTILE DYSFUNCTION TYPE: ICD-10-CM

## 2018-11-13 DIAGNOSIS — N18.30 CHRONIC KIDNEY DISEASE, STAGE III (MODERATE) (HCC): ICD-10-CM

## 2018-11-13 DIAGNOSIS — N40.1 BENIGN PROSTATIC HYPERPLASIA WITH LOWER URINARY TRACT SYMPTOMS, SYMPTOM DETAILS UNSPECIFIED: ICD-10-CM

## 2018-11-13 DIAGNOSIS — F41.9 ANXIETY: ICD-10-CM

## 2018-11-13 DIAGNOSIS — I10 BENIGN ESSENTIAL HYPERTENSION: ICD-10-CM

## 2018-11-13 PROBLEM — Z12.11 COLON CANCER SCREENING: Status: RESOLVED | Noted: 2017-12-20 | Resolved: 2018-11-13

## 2018-11-13 PROBLEM — Z85.038 HISTORY OF COLON CANCER: Status: RESOLVED | Noted: 2017-12-20 | Resolved: 2018-11-13

## 2018-11-13 PROBLEM — E66.01 MORBIDLY OBESE (HCC): Status: ACTIVE | Noted: 2018-11-13

## 2018-11-13 PROBLEM — G25.81 RESTLESS LEGS SYNDROME (RLS): Status: ACTIVE | Noted: 2018-11-13

## 2018-11-13 PROBLEM — K92.2 LGI BLEED: Status: RESOLVED | Noted: 2018-07-30 | Resolved: 2018-11-13

## 2018-11-13 PROBLEM — Z85.038 HISTORY OF COLON CANCER: Status: ACTIVE | Noted: 2017-12-20

## 2018-11-13 PROBLEM — E66.01 MORBIDLY OBESE: Status: RESOLVED | Noted: 2018-11-13 | Resolved: 2018-11-13

## 2018-11-13 PROCEDURE — G0439 PPPS, SUBSEQ VISIT: HCPCS | Performed by: INTERNAL MEDICINE

## 2018-11-13 PROCEDURE — G0009 ADMIN PNEUMOCOCCAL VACCINE: HCPCS | Performed by: INTERNAL MEDICINE

## 2018-11-13 PROCEDURE — 90732 PPSV23 VACC 2 YRS+ SUBQ/IM: CPT | Performed by: INTERNAL MEDICINE

## 2018-11-13 PROCEDURE — 99214 OFFICE O/P EST MOD 30 MIN: CPT | Performed by: INTERNAL MEDICINE

## 2018-11-13 RX ORDER — POTASSIUM CHLORIDE 750 MG/1
10 TABLET, FILM COATED, EXTENDED RELEASE ORAL 2 TIMES DAILY
COMMUNITY

## 2018-11-13 RX ORDER — PRAMIPEXOLE DIHYDROCHLORIDE 0.25 MG/1
0.25 TABLET ORAL DAILY
Qty: 90 TABLET | Refills: 3 | Status: SHIPPED | OUTPATIENT
Start: 2018-11-13 | End: 2019-12-03 | Stop reason: SDUPTHER

## 2018-11-13 RX ORDER — ESCITALOPRAM OXALATE 5 MG/1
5 TABLET ORAL DAILY
Qty: 90 TABLET | Refills: 3 | Status: SHIPPED | OUTPATIENT
Start: 2018-11-13 | End: 2019-11-20 | Stop reason: SDUPTHER

## 2018-11-13 RX ORDER — LIRAGLUTIDE 6 MG/ML
INJECTION SUBCUTANEOUS
Qty: 9 ML | Refills: 1 | Status: SHIPPED | OUTPATIENT
Start: 2018-11-13 | End: 2018-11-13

## 2018-11-13 NOTE — PROGRESS NOTES
Subjective     James Mckeon is a 73 y.o. male who presents for an annual wellness visit as well as check up of dm-2, htn, hld, ckd.        History of Present Illness     Dm-2.  Followed by wendy. Reviewed regimen.  HTN. Control is good with current.    HLD.  Control is good with current regimen.  CKD.  Numbers are stable.   Anxiety.  Good with lexapro.        Review of Systems   Respiratory: Negative.    Cardiovascular: Negative.    Genitourinary:        ED is bothersome despite testosterone.  Viagra does not work for him.  He has seen urology in the past.       The following portions of the patient's history were reviewed and updated as appropriate: allergies, current medications, past family history, past medical history, past social history, past surgical history and problem list.  Health maintenance tab was reviewed and updated with the patient.       Patient Active Problem List    Diagnosis Date Noted   • Lower GI bleed 08/01/2018   • History of colon cancer 12/20/2017     Note Last Updated: 12/20/2017     Added automatically from request for surgery [id number]     • History of colon polyps 12/20/2017     Note Last Updated: 1/29/2018     Severe atypia, bordering on carcinoma in situ, s/p resection right colon 2006     • Vitamin D deficiency 02/16/2017   • Uncontrolled type 2 diabetes mellitus (CMS/HCC) 05/26/2016   • Coronary arteriosclerosis in native artery 03/29/2016   • Anxiety 02/25/2016   • Benign essential hypertension 02/25/2016   • Benign prostatic hyperplasia 02/25/2016   • Chronic kidney disease, stage III (moderate) (CMS/HCC) 02/25/2016   • Hyperlipidemia 02/25/2016   • Testosterone deficiency 02/25/2016   • Obstructive sleep apnea syndrome 02/25/2016   • History of coronary artery bypass surgery 05/14/2013       Past Medical History:   Diagnosis Date   • Colon polyps    • Coronary artery disease    • Hypertension    • Sleep apnea    • Testosterone deficiency    • Type 2 diabetes mellitus (CMS/HCC)         Past Surgical History:   Procedure Laterality Date   • CARDIAC CATHETERIZATION     • CHOLECYSTECTOMY     • COLECTOMY PARTIAL / TOTAL     • CORONARY ARTERY BYPASS GRAFT     • HERNIA REPAIR     • NEPHRECTOMY         Family History   Adopted: Yes   Problem Relation Age of Onset   • Malig Hyperthermia Neg Hx        Social History     Socioeconomic History   • Marital status:      Spouse name: Not on file   • Number of children: Not on file   • Years of education: Not on file   • Highest education level: Not on file   Social Needs   • Financial resource strain: Not on file   • Food insecurity - worry: Not on file   • Food insecurity - inability: Not on file   • Transportation needs - medical: Not on file   • Transportation needs - non-medical: Not on file   Occupational History   • Not on file   Tobacco Use   • Smoking status: Former Smoker   • Smokeless tobacco: Never Used   • Tobacco comment: 50 yrs ago   Substance and Sexual Activity   • Alcohol use: No   • Drug use: No   • Sexual activity: Not on file   Other Topics Concern   • Not on file   Social History Narrative   • Not on file       Current Outpatient Medications on File Prior to Visit   Medication Sig Dispense Refill   • aspirin 81 MG EC tablet Take 1 tablet by mouth Daily.     • Cholecalciferol (VITAMIN D PO) Take 2,000 Units by mouth Daily.     • escitalopram (LEXAPRO) 5 MG tablet TAKE 1 TABLET BY MOUTH DAILY 90 tablet 0   • fenofibrate (TRICOR) 145 MG tablet Take 1 tablet by mouth Daily. 30 tablet 0   • furosemide (LASIX) 40 MG tablet Take 1 tablet by mouth Daily.     • glucose blood test strip Test 4 times daily 200 each 3   • Insulin Pen Needle 31G X 8 MM misc Use to inject 2 times daily 100 each 5   • losartan (COZAAR) 25 MG tablet Take 25 mg by mouth daily.     • LUMIGAN 0.01 % ophthalmic drops INSTILL 1 DROP IN OU QHS  5   • metoprolol succinate XL (TOPROL-XL) 50 MG 24 hr tablet Take 1.5 tablets by mouth every 12 (twelve) hours.     •  "nateglinide (STARLIX) 120 MG tablet Take 1 tablet by mouth 3 (Three) Times a Day Before Meals. 90 tablet 1   • potassium chloride (K-DUR) 10 MEQ CR tablet Take 10 mEq by mouth 2 (Two) Times a Day.     • rosuvastatin (CRESTOR) 20 MG tablet Take 20 mg by mouth daily.     • Syringe 23G X 1\" 3 ML misc Use as directed for testosterone injection 6 each 1   • Testosterone Cypionate (DEPOTESTOTERONE CYPIONATE) 200 MG/ML injection INJECT 1 ML INTO THE SHOULDER, THIGH, OR BUTTOCKS EVERY 14 DAYS 2 mL 0   • VICTOZA 18 MG/3ML solution pen-injector injection Inject 1.8 mg under the skin Daily. 9 pen 1   • [DISCONTINUED] pramipexole (MIRAPEX) 0.25 MG tablet Take 0.25 mg by mouth daily.     • [DISCONTINUED] VICTOZA 18 MG/3ML solution pen-injector injection ADMINISTER 1.8 MG UNDER THE SKIN DAILY 9 mL 1   • Insulin Glargine (TOUJEO MAX SOLOSTAR) 300 UNIT/ML solution pen-injector Inject  under the skin into the appropriate area as directed.       No current facility-administered medications on file prior to visit.        No Known Allergies    Immunization History   Administered Date(s) Administered   • Flu Vaccine High Dose PF 65YR+ 10/05/2015, 10/04/2016, 09/14/2017, 09/12/2018   • Hepatitis A 09/17/2018   • Pneumococcal Conjugate 13-Valent (PCV13) 11/07/2017   • Tdap 01/01/2008       Objective     /76   Pulse 85   Ht 177.8 cm (70\")   Wt 130 kg (287 lb)   SpO2 94%   BMI 41.18 kg/m²     Physical Exam   Constitutional: He is oriented to person, place, and time. He appears well-developed and well-nourished.   HENT:   Head: Normocephalic and atraumatic.   Right Ear: Hearing and tympanic membrane normal.   Left Ear: Hearing and tympanic membrane normal.   Mouth/Throat: No posterior oropharyngeal erythema.   Neck: Neck supple. No thyromegaly present.   Cardiovascular: Normal rate, regular rhythm and normal heart sounds.   No murmur heard.  Pulmonary/Chest: Effort normal and breath sounds normal.   Abdominal: Soft. He exhibits no " distension. There is no hepatosplenomegaly. There is no tenderness.    James had a diabetic foot exam performed today.   During the foot exam he had a monofilament test performed (normal monofilament).  Skin Integrity  -  His right foot skin is intact.His left foot skin is intact..   Foot Structure and Biomechanics -  His right foot has no hallux valgus and no hammer toes present. His left foot has no hallux valgus and no hammer toes.  Lymphadenopathy:     He has no cervical adenopathy.   Neurological: He is alert and oriented to person, place, and time.   Skin: Skin is warm and dry.   Psychiatric: He has a normal mood and affect. His speech is normal and behavior is normal. Judgment and thought content normal. Cognition and memory are normal.       Assessment/Plan   James was seen today for medicare wellness-subsequent.    Diagnoses and all orders for this visit:    Medicare annual wellness visit, subsequent    Benign essential hypertension  -     pramipexole (MIRAPEX) 0.25 MG tablet; Take 1 tablet by mouth Daily.    Hyperlipidemia, unspecified hyperlipidemia type    Chronic kidney disease, stage III (moderate) (CMS/[facility])  -     pramipexole (MIRAPEX) 0.25 MG tablet; Take 1 tablet by mouth Daily.    Type 2 diabetes mellitus without complication, unspecified whether long term insulin use (CMS/[facility])  -     Microalbumin / Creatinine Urine Ratio - Urine, Clean Catch    Benign prostatic hyperplasia with lower urinary tract symptoms, symptom details unspecified  -     Ambulatory Referral to Urology    Erectile dysfunction, unspecified erectile dysfunction type  -     Ambulatory Referral to Urology    Other orders  -     Pneumococcal Polysaccharide Vaccine 23-Valent Greater Than or Equal To 3yo Subcutaneous / IM        Discussion    AWV.  See scanned forms and/or computer for airam history, PHQ-9, functional ability questionnaire, cognitive impairment screening.  Direct observation of cognitive abilities:  The patient does  not exhibit any impairment in cognitive abilities upon direct observation at today's visit.     These were all reviewed with the patient and the patient was provided with a personal prevention plan of service in patient instructions.  Patient was given advice or handouts on the following topics:  exercise   DM-2.  Continue with endo.  HTN.  Continue current regimen.  HLD. Continue current regimen.  CKD.  Continue NSAID avoidance and plenty of water.   Anxiety.  Continue lexapro.  BPH/ED.  Refer back to urology.    I have recommended that the patient get the following immunizations:  Shingrix and tdap.        Health Maintenance   Topic Date Due   • ZOSTER VACCINE (2 of 2) 04/24/2007   • DIABETIC EYE EXAM  04/10/2017   • TDAP/TD VACCINES (2 - Td) 01/01/2018   • URINE MICROALBUMIN  11/01/2018   • PNEUMOCOCCAL VACCINES (65+ LOW/MEDIUM RISK) (2 of 2 - PPSV23) 11/07/2018   • HEMOGLOBIN A1C  01/31/2019   • LIPID PANEL  11/06/2019   • MEDICARE ANNUAL WELLNESS  11/13/2019   • DIABETIC FOOT EXAM  11/13/2019   • COLONOSCOPY  07/31/2022   • INFLUENZA VACCINE  Completed   • AAA SCREEN (ONE-TIME)  Completed   • HEPATITIS C SCREENING  Addressed            Future Appointments   Date Time Provider Department Center   12/21/2018  9:00 AM [facility] None   1/4/2019  2:00 PM Meri Ocasio MD [facility] None   2/15/2019  9:00 AM Humberto Barone MD [facility] None   3/18/2019  9:30 AM [facility] None

## 2018-11-13 NOTE — PATIENT INSTRUCTIONS
Medicare Wellness  Personal Prevention Plan of Service     Date of Office Visit:  2018  Encounter Provider:  Radha Haji MD  Place of Service:  [facility] INTERNAL MEDICINE  Patient Name: James Mckeon  :  1945    As part of the Medicare Wellness portion of your visit today, we are providing you with this personalized preventive plan of services (PPPS). This plan is based upon recommendations of the [place] Preventive Services Task Force (USPSTF) and the Advisory Committee on Immunization Practices (ACIP).    This lists the preventive care services that should be considered, and provides dates of when you are due. Items listed as completed are up-to-date and do not require any further intervention.    Health Maintenance   Topic Date Due   • ZOSTER VACCINE (2 of 2) 2007   • DIABETIC EYE EXAM  04/10/2017   • TDAP/TD VACCINES (2 - Td) 2018   • URINE MICROALBUMIN  2018   • MEDICARE ANNUAL WELLNESS  2018   • PNEUMOCOCCAL VACCINES (65+ LOW/MEDIUM RISK) (2 of 2 - PPSV23) 2018   • DIABETIC FOOT EXAM  2018   • HEMOGLOBIN A1C  2019   • LIPID PANEL  2019   • COLONOSCOPY  2022   • INFLUENZA VACCINE  Completed   • AAA SCREEN (ONE-TIME)  Completed   • HEPATITIS C SCREENING  Addressed       No orders of the defined types were placed in this encounter.      No Follow-up on file.

## 2018-11-14 LAB
ALBUMIN/CREAT UR: 37.1 MG/G CREAT (ref 0–30)
CREAT UR-MCNC: 41.2 MG/DL
MICROALBUMIN UR-MCNC: 15.3 UG/ML

## 2018-12-03 RX ORDER — TESTOSTERONE CYPIONATE 200 MG/ML
INJECTION, SOLUTION INTRAMUSCULAR
Qty: 10 ML | Refills: 0 | Status: CANCELLED | OUTPATIENT
Start: 2018-12-03

## 2018-12-04 ENCOUNTER — OFFICE VISIT (OUTPATIENT)
Dept: ENDOCRINOLOGY | Age: 73
End: 2018-12-04

## 2018-12-04 VITALS
DIASTOLIC BLOOD PRESSURE: 76 MMHG | SYSTOLIC BLOOD PRESSURE: 142 MMHG | WEIGHT: 278 LBS | HEIGHT: 70 IN | BODY MASS INDEX: 39.8 KG/M2

## 2018-12-04 DIAGNOSIS — E78.2 MIXED HYPERLIPIDEMIA: ICD-10-CM

## 2018-12-04 DIAGNOSIS — I10 BENIGN ESSENTIAL HYPERTENSION: Primary | ICD-10-CM

## 2018-12-04 DIAGNOSIS — E55.9 VITAMIN D DEFICIENCY: ICD-10-CM

## 2018-12-04 DIAGNOSIS — E34.9 TESTOSTERONE DEFICIENCY: ICD-10-CM

## 2018-12-04 DIAGNOSIS — R39.12 POOR URINARY STREAM: ICD-10-CM

## 2018-12-04 DIAGNOSIS — E11.65 UNCONTROLLED TYPE 2 DIABETES MELLITUS WITH HYPERGLYCEMIA (HCC): ICD-10-CM

## 2018-12-04 PROCEDURE — 99214 OFFICE O/P EST MOD 30 MIN: CPT | Performed by: NURSE PRACTITIONER

## 2018-12-04 RX ORDER — TESTOSTERONE CYPIONATE 200 MG/ML
INJECTION, SOLUTION INTRAMUSCULAR
Qty: 2 ML | Refills: 0
Start: 2018-12-04 | End: 2019-06-05 | Stop reason: SDUPTHER

## 2018-12-04 RX ORDER — FENOFIBRATE 145 MG/1
145 TABLET, COATED ORAL DAILY
Qty: 90 TABLET | Refills: 1 | Status: SHIPPED | OUTPATIENT
Start: 2018-12-04 | End: 2019-05-07

## 2018-12-04 NOTE — PROGRESS NOTES
"Subjective   James Mckeon is a 73 y.o. male is here today for follow-up.  Chief Complaint   Patient presents with   • Diabetes     Recent labs, Pt tests BG 1x daily, pt brought meter   • Hyperlipidemia   • Hypertension   • Vitamin D Deficiency   • low testosterone     /76   Ht 177.8 cm (70\")   Wt 126 kg (278 lb)   BMI 39.89 kg/m²   Current Outpatient Medications on File Prior to Visit   Medication Sig   • aspirin 81 MG EC tablet Take 1 tablet by mouth Daily.   • Cholecalciferol (VITAMIN D PO) Take 2,000 Units by mouth Daily.   • escitalopram (LEXAPRO) 5 MG tablet Take 1 tablet by mouth Daily.   • fenofibrate (TRICOR) 145 MG tablet Take 1 tablet by mouth Daily.   • furosemide (LASIX) 40 MG tablet Take 1 tablet by mouth Daily.   • glucose blood test strip Test 4 times daily   • Insulin Glargine (TOUJEO MAX SOLOSTAR) 300 UNIT/ML solution pen-injector Inject 77 Units under the skin into the appropriate area as directed Daily.   • Insulin Pen Needle 31G X 8 MM misc Use to inject 2 times daily   • losartan (COZAAR) 25 MG tablet Take 25 mg by mouth daily.   • LUMIGAN 0.01 % ophthalmic drops INSTILL 1 DROP IN OU QHS   • metoprolol succinate XL (TOPROL-XL) 50 MG 24 hr tablet Take 1.5 tablets by mouth every 12 (twelve) hours.   • nateglinide (STARLIX) 120 MG tablet Take 1 tablet by mouth 3 (Three) Times a Day Before Meals.   • potassium chloride (K-DUR) 10 MEQ CR tablet Take 10 mEq by mouth 2 (Two) Times a Day.   • pramipexole (MIRAPEX) 0.25 MG tablet Take 1 tablet by mouth Daily.   • rosuvastatin (CRESTOR) 20 MG tablet Take 20 mg by mouth daily.   • Syringe 23G X 1\" 3 ML misc Use as directed for testosterone injection   • VICTOZA 18 MG/3ML solution pen-injector injection Inject 1.8 mg under the skin Daily.   • [DISCONTINUED] Testosterone Cypionate (DEPOTESTOTERONE CYPIONATE) 200 MG/ML injection INJECT 1 ML INTO THE SHOULDER, THIGH, OR BUTTOCKS EVERY 14 DAYS     No current facility-administered medications on file " prior to visit.      Family History   Adopted: Yes   Problem Relation Age of Onset   • Malig Hyperthermia Neg Hx      Social History     Tobacco Use   • Smoking status: Former Smoker   • Smokeless tobacco: Never Used   • Tobacco comment: 50 yrs ago   Substance Use Topics   • Alcohol use: No   • Drug use: No     No Known Allergies      History of Present Illness  Encounter Diagnoses   Name Primary?   • Benign essential hypertension Yes   • Mixed hyperlipidemia    • Vitamin D deficiency    • Testosterone deficiency    • Uncontrolled type 2 diabetes mellitus with hyperglycemia (CMS/[facility])      This is a 73-year-old male patient here today for routine follow-up visit.  He is being seen for the above-mentioned problems.  He had recent labs done his primary care provider's office and he was provided a copy.  He will have additional labs today to check his testosterone level as well as his hemoglobin A1c.  He states he feels good on testosterone therapy.  He's had to reschedule his appointment for Dr. Ocasio in January 4 because [place] be out of [place] however he's been told he cannot get an appointment until July.  He is very upset at today's visit due to problems he had with his office getting his controlled substance filled as well as calling in rescheduling appointments.  He was able to speak with the  at today's visit.  Prescription for testosterone cypionate 1 cc IM was written for patient and prescribed by Dr. Ocasio. A dennis was reviewed. He states he is taking his meds as prescribed. His bp is elevated and he was agitated at visit.   The following portions of the patient's history were reviewed and updated as appropriate: allergies, current medications, past family history, past medical history, past social history, past surgical history and problem list.    Review of Systems   Constitutional: Negative for fatigue.   HENT: Negative for trouble swallowing.    Eyes: Negative for visual disturbance.    Respiratory: Negative for shortness of breath.    Cardiovascular: Negative for leg swelling.   Endocrine: Negative for polyuria.   Skin: Negative for wound.   Neurological: Negative for numbness.       Objective   Physical Exam   Constitutional: He is oriented to person, place, and time. He appears well-developed and well-nourished. No distress.   HENT:   Head: Normocephalic and atraumatic.   Right Ear: External ear normal.   Left Ear: External ear normal.   Nose: Nose normal.   Eyes: Pupils are equal, round, and reactive to light. Right eye exhibits no discharge. Left eye exhibits no discharge.   Neck: Normal range of motion. Neck supple. Carotid bruit is not present. No tracheal deviation, no edema and no erythema present. No thyromegaly present.   Cardiovascular: Normal rate, regular rhythm, normal heart sounds and intact distal pulses. Exam reveals no gallop and no friction rub.   No murmur heard.  Pulmonary/Chest: Effort normal and breath sounds normal. No respiratory distress. He has no wheezes. He has no rales.   Abdominal: He exhibits no distension. There is no tenderness.   Musculoskeletal: Normal range of motion. He exhibits edema. He exhibits no deformity.   Trace edema in left lower ext   Lymphadenopathy:     He has no cervical adenopathy.   Neurological: He is alert and oriented to person, place, and time. Coordination normal.   Skin: Skin is warm and dry. No rash noted. He is not diaphoretic. No erythema. No pallor.   Psychiatric: He has a normal mood and affect. His behavior is normal. Judgment and thought content normal.   Nursing note and vitals reviewed.    Lab Results   Component Value Date    HGBA1C 7.40 (H) 07/30/2018     Lab Results   Component Value Date    GLUCOSE 110 (H) 08/01/2018    BUN 39 (H) 11/06/2018    CREATININE 1.69 (H) 11/06/2018    EGFRIFNONA 40 (L) 11/06/2018    EGFRIFAFRI 49 (L) 11/06/2018    BCR 23.1 11/06/2018    K 4.8 11/06/2018    CO2 27.8 11/06/2018    CALCIUM 10.5  11/06/2018    PROTENTOTREF 7.3 11/06/2018    ALBUMIN 4.20 11/06/2018    LABIL2 1.4 11/06/2018    AST 17 11/06/2018    ALT 40 11/06/2018     Lab Results   Component Value Date    CHLPL 141 11/06/2018    TRIG 232 (H) 11/06/2018    HDL 40 11/06/2018    LDL 55 11/06/2018     Lab Results   Component Value Date    TSH 2.49 11/06/2018    M4QOZHG 7.87 06/27/2017         Assessment/Plan   James was seen today for diabetes, hyperlipidemia, hypertension, vitamin d deficiency and low testosterone.    Diagnoses and all orders for this visit:    Benign essential hypertension  -     C-Peptide  -     Hemoglobin A1c  -     Vitamin D 25 Hydroxy  -     TestT+TestF+SHBG  -     PSA DIAGNOSTIC    Mixed hyperlipidemia  -     C-Peptide  -     Hemoglobin A1c  -     Vitamin D 25 Hydroxy  -     TestT+TestF+SHBG  -     PSA DIAGNOSTIC    Vitamin D deficiency  -     C-Peptide  -     Hemoglobin A1c  -     Vitamin D 25 Hydroxy  -     TestT+TestF+SHBG  -     PSA DIAGNOSTIC    Testosterone deficiency  -     C-Peptide  -     Hemoglobin A1c  -     Vitamin D 25 Hydroxy  -     TestT+TestF+SHBG  -     PSA DIAGNOSTIC    Uncontrolled type 2 diabetes mellitus with hyperglycemia (CMS/[facility])  -     C-Peptide  -     Hemoglobin A1c  -     Vitamin D 25 Hydroxy  -     TestT+TestF+SHBG  -     PSA DIAGNOSTIC    Poor urinary stream   -     PSA DIAGNOSTIC    Other orders  -     Testosterone Cypionate (DEPOTESTOTERONE CYPIONATE) 200 MG/ML injection; 1 cc im every 14 days      In summary, patient was seen and examined.  He will have additional labs done today's visit on be notified of the results along with any further recommendations.  Reji was reviewed at today's visit.  He was given a written prescription for testosterone therapy signed by Dr. Ocasio.  He was able to speak to the manager at today's visit to address his concerns and complaints.  He will follow-up with Dr. Ocasio or myself in 6 months with labs.  His blood pressure is slightly elevated at today's  visit.  He was agitated during most of his visit.  Last from his primary care were reviewed.  His lipids are stable with the exception of triglycerides. He refused rx vascepa and states instead he will get and take otc fish oil.

## 2018-12-05 LAB
25(OH)D3+25(OH)D2 SERPL-MCNC: 50.3 NG/ML (ref 30–100)
C PEPTIDE SERPL-MCNC: 13.1 NG/ML (ref 1.1–4.4)
HBA1C MFR BLD: 7.9 % (ref 4.8–5.6)
PSA SERPL-MCNC: 1.96 NG/ML (ref 0–4)
SHBG SERPL-SCNC: 25.9 NMOL/L (ref 19.3–76.4)
TESTOST FREE SERPL-MCNC: 15.2 PG/ML (ref 6.6–18.1)
TESTOST SERPL-MCNC: 456 NG/DL (ref 264–916)

## 2018-12-14 ENCOUNTER — TELEPHONE (OUTPATIENT)
Dept: ENDOCRINOLOGY | Age: 73
End: 2018-12-14

## 2018-12-14 RX ORDER — INSULIN GLARGINE 300 U/ML
INJECTION, SOLUTION SUBCUTANEOUS
Qty: 27 ML | Refills: 1 | Status: SHIPPED | OUTPATIENT
Start: 2018-12-14 | End: 2019-03-20 | Stop reason: SDUPTHER

## 2018-12-14 NOTE — TELEPHONE ENCOUNTER
----- Message from Olesya Baum sent at 12/14/2018 10:30 AM EST -----  Contact: Nadia - Pharmacy intern  Nadia with [facility] ph. [phone] advised that patient is out of Toujeo pens and they sent request to our office 12-11, 12-13-18 and have not heard back.   75 units 1xday, 30 day supply. She said patient normally gets 27 ML's  authorized and they dispense accordingly and patient gets  5 pens.             Rx has been sent into the pharmacy.

## 2018-12-20 RX ORDER — NATEGLINIDE 120 MG/1
TABLET ORAL
Qty: 90 TABLET | Refills: 1 | Status: SHIPPED | OUTPATIENT
Start: 2018-12-20 | End: 2019-04-07 | Stop reason: SDUPTHER

## 2019-01-09 RX ORDER — LIRAGLUTIDE 6 MG/ML
INJECTION SUBCUTANEOUS
Qty: 9 ML | Refills: 0 | Status: SHIPPED | OUTPATIENT
Start: 2019-01-09 | End: 2019-02-09 | Stop reason: SDUPTHER

## 2019-02-11 RX ORDER — LIRAGLUTIDE 6 MG/ML
INJECTION SUBCUTANEOUS
Qty: 9 ML | Refills: 0 | Status: SHIPPED | OUTPATIENT
Start: 2019-02-11 | End: 2019-03-11 | Stop reason: SDUPTHER

## 2019-02-15 ENCOUNTER — APPOINTMENT (OUTPATIENT)
Dept: SLEEP MEDICINE | Facility: HOSPITAL | Age: 74
End: 2019-02-15
Attending: INTERNAL MEDICINE

## 2019-03-11 RX ORDER — LIRAGLUTIDE 6 MG/ML
INJECTION SUBCUTANEOUS
Qty: 9 ML | Refills: 0 | Status: SHIPPED | OUTPATIENT
Start: 2019-03-11 | End: 2019-03-20 | Stop reason: SDUPTHER

## 2019-03-20 ENCOUNTER — TELEPHONE (OUTPATIENT)
Dept: ENDOCRINOLOGY | Age: 74
End: 2019-03-20

## 2019-03-20 NOTE — TELEPHONE ENCOUNTER
Spoke with pt and let him know to monitor bg and to stop medications. He expressed understanding    ----- Message from UMU Chavez sent at 3/20/2019 10:21 AM EDT -----  Contact: patient  Stop both medications. See if symptoms improve. He will need to check blood sugars because they are going to go up. List meds as allergy along with his complaints of symptoms associated. Make this a phone encounter. Update medication list to reflect these meds where stopped.   ----- Message -----  From: Stormy Hale MA  Sent: 3/20/2019   9:59 AM  To: UMU Chavez    I let pt know that you thought he might have a cold. He states that he's had these symptoms for over a year. He is adamant about it being the medication.  ----- Message -----  From: Pamella Fontaine APRN  Sent: 3/19/2019   3:53 PM  To: Stormy Hale MA    He most likely has a cold  ----- Message -----  From: Stormy Hale MA  Sent: 3/19/2019   3:31 PM  To: UMU Chavez    bg in door  ----- Message -----  From: Unique Vera  Sent: 3/18/2019   4:17 PM  To: Stormy Hale MA    I called patient for the blood sugar readings   He said he is going to get them tonight  He is out of town and he is going to call me in the morning with them      ----- Message -----  From: Stormy Hale MA  Sent: 3/18/2019   2:07 PM  To: Unique Vera    I need blood sugar readings  ----- Message -----  From: Unique Vera  Sent: 3/18/2019  12:47 PM  To: Stormy Hale MA    Medication is making him cough with drainage  victoza and toujeo is not agreeing  With him and he needs to change them    Patient would like a return call  [phone]

## 2019-03-29 ENCOUNTER — APPOINTMENT (OUTPATIENT)
Dept: SLEEP MEDICINE | Facility: HOSPITAL | Age: 74
End: 2019-03-29
Attending: INTERNAL MEDICINE

## 2019-04-08 RX ORDER — NATEGLINIDE 120 MG/1
TABLET ORAL
Qty: 90 TABLET | Refills: 0 | Status: SHIPPED | OUTPATIENT
Start: 2019-04-08 | End: 2019-06-05

## 2019-04-16 RX ORDER — LIRAGLUTIDE 6 MG/ML
INJECTION SUBCUTANEOUS
Qty: 9 ML | Refills: 0 | Status: SHIPPED | OUTPATIENT
Start: 2019-04-16 | End: 2019-06-05 | Stop reason: SDUPTHER

## 2019-04-23 RX ORDER — LIRAGLUTIDE 6 MG/ML
INJECTION SUBCUTANEOUS
Qty: 9 ML | Refills: 0 | Status: SHIPPED | OUTPATIENT
Start: 2019-04-23 | End: 2019-05-07 | Stop reason: SDUPTHER

## 2019-05-06 DIAGNOSIS — E78.5 HYPERLIPIDEMIA, UNSPECIFIED HYPERLIPIDEMIA TYPE: ICD-10-CM

## 2019-05-06 DIAGNOSIS — I10 BENIGN ESSENTIAL HYPERTENSION: ICD-10-CM

## 2019-05-06 DIAGNOSIS — N18.30 CHRONIC KIDNEY DISEASE, STAGE III (MODERATE) (HCC): ICD-10-CM

## 2019-05-06 DIAGNOSIS — E34.9 TESTOSTERONE DEFICIENCY: ICD-10-CM

## 2019-05-06 DIAGNOSIS — E11.65 TYPE 2 DIABETES MELLITUS WITH HYPERGLYCEMIA (HCC): ICD-10-CM

## 2019-05-07 ENCOUNTER — OFFICE VISIT (OUTPATIENT)
Dept: INTERNAL MEDICINE | Facility: CLINIC | Age: 74
End: 2019-05-07

## 2019-05-07 VITALS
DIASTOLIC BLOOD PRESSURE: 78 MMHG | HEIGHT: 70 IN | WEIGHT: 285 LBS | SYSTOLIC BLOOD PRESSURE: 130 MMHG | OXYGEN SATURATION: 97 % | BODY MASS INDEX: 40.8 KG/M2 | HEART RATE: 80 BPM

## 2019-05-07 DIAGNOSIS — E11.65 TYPE 2 DIABETES MELLITUS WITH HYPERGLYCEMIA (HCC): ICD-10-CM

## 2019-05-07 DIAGNOSIS — N18.30 CHRONIC KIDNEY DISEASE, STAGE III (MODERATE) (HCC): ICD-10-CM

## 2019-05-07 DIAGNOSIS — I10 BENIGN ESSENTIAL HYPERTENSION: ICD-10-CM

## 2019-05-07 DIAGNOSIS — E78.5 HYPERLIPIDEMIA: ICD-10-CM

## 2019-05-07 DIAGNOSIS — R06.02 SHORTNESS OF BREATH: Primary | ICD-10-CM

## 2019-05-07 PROCEDURE — 71046 X-RAY EXAM CHEST 2 VIEWS: CPT | Performed by: INTERNAL MEDICINE

## 2019-05-07 PROCEDURE — 93000 ELECTROCARDIOGRAM COMPLETE: CPT | Performed by: INTERNAL MEDICINE

## 2019-05-07 PROCEDURE — 99214 OFFICE O/P EST MOD 30 MIN: CPT | Performed by: INTERNAL MEDICINE

## 2019-05-07 RX ORDER — FUROSEMIDE 40 MG/1
60 TABLET ORAL DAILY
Qty: 135 TABLET | Refills: 3
Start: 2019-05-07 | End: 2022-12-14 | Stop reason: SDUPTHER

## 2019-05-07 NOTE — PROGRESS NOTES
Subjective     James Mckeon is a 74 y.o. male who presents with   Chief Complaint   Patient presents with   • Shortness of Breath       History of Present Illness     SOB started three months ago.  SOB primarily at night.  Propping up bed helps.  Raising bed helpful and resolves problem.  SOA with exertion as well.  No wheezing.    He is coughing a lot with production.  No fever is associated.  Head and nasal congestion.  No CP.  No increased edema.  He sees Dr. Das in a couple weeks.      Review of Systems   Constitutional: Negative for fever.   HENT: Positive for congestion.    Respiratory: Positive for shortness of breath. Negative for chest tightness and wheezing.    Cardiovascular: Negative for chest pain.       The following portions of the patient's history were reviewed and updated as appropriate: allergies, current medications and problem list.    Patient Active Problem List    Diagnosis Date Noted   • Restless legs syndrome (RLS) 11/13/2018   • Lower GI bleed 08/01/2018   • History of colon cancer 12/20/2017     Note Last Updated: 1/29/2018     Severe atypia, bordering on carcinoma in situ, s/p resection right colon 2006     • Vitamin D deficiency 02/16/2017   • Uncontrolled type 2 diabetes mellitus (CMS/[facility]) 05/26/2016   • Coronary arteriosclerosis in native artery 03/29/2016   • Anxiety 02/25/2016   • Benign essential hypertension 02/25/2016   • Benign prostatic hyperplasia 02/25/2016   • Chronic kidney disease, stage III (moderate) (CMS/[facility]) 02/25/2016   • Hyperlipidemia 02/25/2016   • Testosterone deficiency 02/25/2016   • Obstructive sleep apnea syndrome 02/25/2016   • History of coronary artery bypass surgery 05/14/2013       Current Outpatient Medications on File Prior to Visit   Medication Sig Dispense Refill   • aspirin 81 MG EC tablet Take 1 tablet by mouth Daily.     • Cholecalciferol (VITAMIN D PO) Take 2,000 Units by mouth Daily.     • escitalopram (LEXAPRO) 5 MG tablet Take 1 tablet by  "mouth Daily. 90 tablet 3   • fenofibrate (TRICOR) 145 MG tablet Take 1 tablet by mouth Daily. 30 tablet 0   • glucose blood test strip Test 4 times daily 200 each 0   • Insulin Pen Needle 31G X 5 MM misc Use to inject insulin 2 x daily 100 each 0   • Insulin Pen Needle 31G X 8 MM misc Use to inject 2 times daily 100 each 5   • losartan (COZAAR) 25 MG tablet Take 25 mg by mouth daily.     • LUMIGAN 0.01 % ophthalmic drops INSTILL 1 DROP IN OU QHS  5   • metoprolol succinate XL (TOPROL-XL) 50 MG 24 hr tablet Take 1.5 tablets by mouth every 12 (twelve) hours.     • nateglinide (STARLIX) 120 MG tablet TAKE 1 TABLET BY MOUTH THREE TIMES DAILY BEFORE MEALS 90 tablet 0   • potassium chloride (K-DUR) 10 MEQ CR tablet Take 10 mEq by mouth 2 (Two) Times a Day.     • pramipexole (MIRAPEX) 0.25 MG tablet Take 1 tablet by mouth Daily. 90 tablet 3   • rosuvastatin (CRESTOR) 20 MG tablet Take 20 mg by mouth daily.     • Syringe/Needle, Disp, (B-D 3CC LUER-CHAPIS SYR 23GX1\") 23G X 1\" 3 ML misc USE AS DIRECTED WITH TESTOSTERONE INJECTION 6 each 0   • Testosterone Cypionate (DEPOTESTOTERONE CYPIONATE) 200 MG/ML injection 1 cc im every 14 days 2 mL 0   • VICTOZA 18 MG/3ML solution pen-injector injection ADMINISTER 1.8 MG UNDER THE SKIN DAILY 9 mL 0   • [DISCONTINUED] fenofibrate (TRICOR) 145 MG tablet TAKE 1 TABLET BY MOUTH DAILY 90 tablet 1   • [DISCONTINUED] furosemide (LASIX) 40 MG tablet Take 1 tablet by mouth Daily.     • [DISCONTINUED] VICTOZA 18 MG/3ML solution pen-injector injection ADMINISTER 1.8 MG UNDER THE SKIN DAILY 9 mL 0     No current facility-administered medications on file prior to visit.        Objective     /78   Pulse 80   Ht 177.8 cm (70\")   Wt 129 kg (285 lb)   SpO2 97%   BMI 40.89 kg/m²     Physical Exam   Constitutional: He is oriented to person, place, and time. He appears well-developed and well-nourished.   HENT:   Head: Atraumatic.   Cardiovascular: Normal rate, regular rhythm and normal heart " sounds.   Pulmonary/Chest: Effort normal and breath sounds normal.   Neurological: He is alert and oriented to person, place, and time.   Skin: Skin is warm and dry.   Psychiatric: He has a normal mood and affect.        ECG 12 Lead  Date/Time: 5/8/2019 6:22 PM  Performed by: Radha Haji MD  Authorized by: Radha Haji MD   Comparison: not compared with previous ECG   Previous ECG: no previous ECG available  Rhythm: sinus rhythm  Rate: normal  Conduction: right bundle branch block  ST Segments: ST segments normal  T Waves: T waves normal  QRS axis: normal    Clinical impression: non-specific ECG              X-rays of the chest  performed today for following indication:   soa.  X-ray reveal nad.  There is no available x-ray for comparison.  X-ray sent to radiology for official interpretation and findings.        Assessment/Plan   James was seen today for shortness of breath.    Diagnoses and all orders for this visit:    Shortness of breath  -     XR Chest PA & Lateral (In Office)  -     CBC & Differential  -     Comprehensive Metabolic Panel  -     Lipid Panel  -     TSH Rfx On Abnormal To Free T4  -     Hemoglobin A1c  -     proBNP  -     Adult Transthoracic Echo Complete W/ Cont if Necessary Per Protocol; Future    Benign essential hypertension  -     furosemide (LASIX) 40 MG tablet; Take 1.5 tablets by mouth Daily.  -     CBC & Differential  -     Comprehensive Metabolic Panel  -     Lipid Panel  -     TSH Rfx On Abnormal To Free T4  -     Hemoglobin A1c  -     proBNP  -     Adult Transthoracic Echo Complete W/ Cont if Necessary Per Protocol; Future    Type 2 diabetes mellitus with hyperglycemia (CMS/HCC)  -     furosemide (LASIX) 40 MG tablet; Take 1.5 tablets by mouth Daily.  -     CBC & Differential  -     Comprehensive Metabolic Panel  -     Lipid Panel  -     TSH Rfx On Abnormal To Free T4  -     Hemoglobin A1c  -     proBNP  -     Adult Transthoracic Echo Complete W/ Cont if Necessary Per  Protocol; Future    Hyperlipidemia  -     furosemide (LASIX) 40 MG tablet; Take 1.5 tablets by mouth Daily.  -     CBC & Differential  -     Comprehensive Metabolic Panel  -     Lipid Panel  -     TSH Rfx On Abnormal To Free T4  -     Hemoglobin A1c  -     proBNP  -     Adult Transthoracic Echo Complete W/ Cont if Necessary Per Protocol; Future    Chronic kidney disease, stage III (moderate) (CMS/HCC)  -     furosemide (LASIX) 40 MG tablet; Take 1.5 tablets by mouth Daily.        Discussion    Patient with DM-2, htn, HLD, CAD, CKD presents with new LACY and SOA.  Check labs today.  He is due for routine labs as well.  Further evaluation warranted with echo as well to rule out CHF.  He is on 40 mg of Lasix daily.  I am going to increase him to 60mg daily.         Future Appointments   Date Time Provider Department Center   5/15/2019  9:00 AM Radha aHji MD [facility] None   5/22/2019  9:00 AM [facility] None   5/24/2019  2:15 PM Humberto Barone MD [facility] None   6/5/2019 10:20 AM Pamella Fontaine APRN [facility] None

## 2019-05-08 LAB
ALBUMIN SERPL-MCNC: 4.3 G/DL (ref 3.5–5.2)
ALBUMIN/GLOB SERPL: 1.6 G/DL
ALP SERPL-CCNC: 37 U/L (ref 39–117)
ALT SERPL-CCNC: 24 U/L (ref 1–41)
AST SERPL-CCNC: 17 U/L (ref 1–40)
BASOPHILS # BLD AUTO: 0.08 10*3/MM3 (ref 0–0.2)
BASOPHILS NFR BLD AUTO: 0.9 % (ref 0–1.5)
BILIRUB SERPL-MCNC: 0.3 MG/DL (ref 0.2–1.2)
BUN SERPL-MCNC: 39 MG/DL (ref 8–23)
BUN/CREAT SERPL: 23.1 (ref 7–25)
CALCIUM SERPL-MCNC: 9.8 MG/DL (ref 8.6–10.5)
CHLORIDE SERPL-SCNC: 101 MMOL/L (ref 98–107)
CHOLEST SERPL-MCNC: 109 MG/DL (ref 0–200)
CO2 SERPL-SCNC: 26.6 MMOL/L (ref 22–29)
CREAT SERPL-MCNC: 1.69 MG/DL (ref 0.76–1.27)
EOSINOPHIL # BLD AUTO: 0.25 10*3/MM3 (ref 0–0.4)
EOSINOPHIL NFR BLD AUTO: 2.9 % (ref 0.3–6.2)
ERYTHROCYTE [DISTWIDTH] IN BLOOD BY AUTOMATED COUNT: 17.5 % (ref 12.3–15.4)
GLOBULIN SER CALC-MCNC: 2.7 GM/DL
GLUCOSE SERPL-MCNC: 187 MG/DL (ref 65–99)
HBA1C MFR BLD: 8 % (ref 4.8–5.6)
HCT VFR BLD AUTO: 50.6 % (ref 37.5–51)
HDLC SERPL-MCNC: 35 MG/DL (ref 40–60)
HGB BLD-MCNC: 14.8 G/DL (ref 13–17.7)
IMM GRANULOCYTES # BLD AUTO: 0.05 10*3/MM3 (ref 0–0.05)
IMM GRANULOCYTES NFR BLD AUTO: 0.6 % (ref 0–0.5)
LDLC SERPL CALC-MCNC: 39 MG/DL (ref 0–100)
LYMPHOCYTES # BLD AUTO: 1.64 10*3/MM3 (ref 0.7–3.1)
LYMPHOCYTES NFR BLD AUTO: 19.1 % (ref 19.6–45.3)
MCH RBC QN AUTO: 25.3 PG (ref 26.6–33)
MCHC RBC AUTO-ENTMCNC: 29.2 G/DL (ref 31.5–35.7)
MCV RBC AUTO: 86.3 FL (ref 79–97)
MONOCYTES # BLD AUTO: 0.69 10*3/MM3 (ref 0.1–0.9)
MONOCYTES NFR BLD AUTO: 8.1 % (ref 5–12)
NEUTROPHILS # BLD AUTO: 5.86 10*3/MM3 (ref 1.7–7)
NEUTROPHILS NFR BLD AUTO: 68.4 % (ref 42.7–76)
NRBC BLD AUTO-RTO: 0 /100 WBC (ref 0–0.2)
NT-PROBNP SERPL-MCNC: 170 PG/ML (ref 0–376)
PLATELET # BLD AUTO: 175 10*3/MM3 (ref 140–450)
POTASSIUM SERPL-SCNC: 4.8 MMOL/L (ref 3.5–5.2)
PROT SERPL-MCNC: 7 G/DL (ref 6–8.5)
RBC # BLD AUTO: 5.86 10*6/MM3 (ref 4.14–5.8)
SODIUM SERPL-SCNC: 141 MMOL/L (ref 136–145)
TRIGL SERPL-MCNC: 176 MG/DL (ref 0–150)
TSH SERPL DL<=0.005 MIU/L-ACNC: 1.98 MIU/ML (ref 0.27–4.2)
VLDLC SERPL CALC-MCNC: 35.2 MG/DL
WBC # BLD AUTO: 8.57 10*3/MM3 (ref 3.4–10.8)

## 2019-05-15 ENCOUNTER — OFFICE VISIT (OUTPATIENT)
Dept: INTERNAL MEDICINE | Facility: CLINIC | Age: 74
End: 2019-05-15

## 2019-05-15 VITALS
HEIGHT: 70 IN | SYSTOLIC BLOOD PRESSURE: 140 MMHG | DIASTOLIC BLOOD PRESSURE: 72 MMHG | WEIGHT: 277 LBS | OXYGEN SATURATION: 96 % | HEART RATE: 66 BPM | BODY MASS INDEX: 39.65 KG/M2

## 2019-05-15 DIAGNOSIS — I10 BENIGN ESSENTIAL HYPERTENSION: ICD-10-CM

## 2019-05-15 DIAGNOSIS — F41.9 ANXIETY: ICD-10-CM

## 2019-05-15 DIAGNOSIS — E11.65 TYPE 2 DIABETES MELLITUS WITH HYPERGLYCEMIA, WITHOUT LONG-TERM CURRENT USE OF INSULIN (HCC): Primary | ICD-10-CM

## 2019-05-15 DIAGNOSIS — N18.30 CHRONIC KIDNEY DISEASE, STAGE III (MODERATE) (HCC): ICD-10-CM

## 2019-05-15 DIAGNOSIS — I27.20 PULMONARY HYPERTENSION, MODERATE TO SEVERE (HCC): ICD-10-CM

## 2019-05-15 DIAGNOSIS — E78.2 MIXED HYPERLIPIDEMIA: ICD-10-CM

## 2019-05-15 LAB
BUN SERPL-MCNC: 43 MG/DL (ref 8–23)
BUN/CREAT SERPL: 21.8 (ref 7–25)
CALCIUM SERPL-MCNC: 9.8 MG/DL (ref 8.6–10.5)
CHLORIDE SERPL-SCNC: 101 MMOL/L (ref 98–107)
CO2 SERPL-SCNC: 29.1 MMOL/L (ref 22–29)
CREAT SERPL-MCNC: 1.97 MG/DL (ref 0.76–1.27)
GLUCOSE SERPL-MCNC: 111 MG/DL (ref 65–99)
POTASSIUM SERPL-SCNC: 4.7 MMOL/L (ref 3.5–5.2)
SODIUM SERPL-SCNC: 142 MMOL/L (ref 136–145)

## 2019-05-15 PROCEDURE — 99214 OFFICE O/P EST MOD 30 MIN: CPT | Performed by: INTERNAL MEDICINE

## 2019-05-15 RX ORDER — METOPROLOL SUCCINATE 50 MG/1
TABLET, EXTENDED RELEASE ORAL
COMMUNITY
Start: 2019-05-10

## 2019-05-15 NOTE — PROGRESS NOTES
Subjective     James Mckeon is a 74 y.o. male who presents with   Chief Complaint   Patient presents with   • Diabetes   • Hypertension   • Hyperlipidemia       History of Present Illness     Dm-2.  Followed by endo. Reviewed regimen.  HTN. Control is good with current.    HLD.  Control is good with current regimen.  CKD.  Numbers are stable.   Anxiety.  Good with lexapro.      He states breathing is back to baseline.  Reviewed CXR, labs and echo.  Echo reveals pulmonary hypertension.   He has appointment next week to see cardiology.  Also he has pulmonary sleep medicine consultation.  He will discuss with both.     Review of Systems   Respiratory: Negative.    Cardiovascular: Negative.        The following portions of the patient's history were reviewed and updated as appropriate: allergies, current medications and problem list.    Patient Active Problem List    Diagnosis Date Noted   • Pulmonary hypertension, moderate to severe (CMS/[facility]) 05/15/2019   • Restless legs syndrome (RLS) 11/13/2018   • Lower GI bleed 08/01/2018   • History of colon cancer 12/20/2017     Note Last Updated: 1/29/2018     Severe atypia, bordering on carcinoma in situ, s/p resection right colon 2006     • Vitamin D deficiency 02/16/2017   • Uncontrolled type 2 diabetes mellitus (CMS/HCC) 05/26/2016   • Coronary arteriosclerosis in native artery 03/29/2016   • Anxiety 02/25/2016   • Benign essential hypertension 02/25/2016   • Benign prostatic hyperplasia 02/25/2016   • Chronic kidney disease, stage III (moderate) (CMS/[facility]) 02/25/2016   • Hyperlipidemia 02/25/2016   • Testosterone deficiency 02/25/2016   • Obstructive sleep apnea syndrome 02/25/2016   • History of coronary artery bypass surgery 05/14/2013       Current Outpatient Medications on File Prior to Visit   Medication Sig Dispense Refill   • aspirin 81 MG EC tablet Take 1 tablet by mouth Daily.     • Cholecalciferol (VITAMIN D PO) Take 2,000 Units by mouth Daily.     •  "escitalopram (LEXAPRO) 5 MG tablet Take 1 tablet by mouth Daily. 90 tablet 3   • fenofibrate (TRICOR) 145 MG tablet Take 1 tablet by mouth Daily. 30 tablet 0   • furosemide (LASIX) 40 MG tablet Take 1.5 tablets by mouth Daily. 135 tablet 3   • glucose blood test strip Test 4 times daily 200 each 0   • Insulin Pen Needle 31G X 5 MM misc Use to inject insulin 2 x daily 100 each 0   • Insulin Pen Needle 31G X 8 MM misc Use to inject 2 times daily 100 each 5   • losartan (COZAAR) 25 MG tablet Take 25 mg by mouth daily.     • LUMIGAN 0.01 % ophthalmic drops INSTILL 1 DROP IN OU QHS  5   • metoprolol succinate XL (TOPROL-XL) 50 MG 24 hr tablet TAKE 1 AND 1/2 TABLETS BY MOUTH TWICE DAILY     • nateglinide (STARLIX) 120 MG tablet TAKE 1 TABLET BY MOUTH THREE TIMES DAILY BEFORE MEALS 90 tablet 0   • potassium chloride (K-DUR) 10 MEQ CR tablet Take 10 mEq by mouth 2 (Two) Times a Day.     • pramipexole (MIRAPEX) 0.25 MG tablet Take 1 tablet by mouth Daily. 90 tablet 3   • rosuvastatin (CRESTOR) 20 MG tablet Take 20 mg by mouth daily.     • Syringe/Needle, Disp, (B-D 3CC LUER-CHAPIS SYR 23GX1\") 23G X 1\" 3 ML misc USE AS DIRECTED WITH TESTOSTERONE INJECTION 6 each 0   • Testosterone Cypionate (DEPOTESTOTERONE CYPIONATE) 200 MG/ML injection 1 cc im every 14 days 2 mL 0   • VICTOZA 18 MG/3ML solution pen-injector injection ADMINISTER 1.8 MG UNDER THE SKIN DAILY 9 mL 0   • [DISCONTINUED] metoprolol succinate XL (TOPROL-XL) 50 MG 24 hr tablet Take 1.5 tablets by mouth every 12 (twelve) hours.       No current facility-administered medications on file prior to visit.        Objective     /72   Pulse 66   Ht 177.8 cm (70\")   Wt 126 kg (277 lb)   SpO2 96%   BMI 39.75 kg/m²     Physical Exam   Constitutional: He is oriented to person, place, and time. He appears well-developed and well-nourished.   HENT:   Head: Atraumatic.   Cardiovascular: Normal rate, regular rhythm and normal heart sounds.   Pulmonary/Chest: Effort normal " and breath sounds normal.   Neurological: He is alert and oriented to person, place, and time.   Skin: Skin is warm and dry.   Psychiatric: He has a normal mood and affect.       Assessment/Plan   James was seen today for diabetes, hypertension and hyperlipidemia.    Diagnoses and all orders for this visit:    Type 2 diabetes mellitus with hyperglycemia, without long-term current use of insulin (CMS/[facility])  -     Basic Metabolic Panel    Benign essential hypertension  -     Basic Metabolic Panel    Mixed hyperlipidemia  -     Basic Metabolic Panel    Chronic kidney disease, stage III (moderate) (CMS/[facility])  -     Basic Metabolic Panel    Anxiety    Pulmonary hypertension, moderate to severe (CMS/[facility])        Discussion    DM-2.  Continue with endo.  HTN.  Continue current regimen.  HLD. Continue current regimen.  CKD.  Continue NSAID avoidance and plenty of water.   With increase of Lasix recheck BMP.    Anxiety.  Continue lexapro.  Pulmonary hypertension.  See cardiology and pulmonary (sleep medicine) in f/u.               Future Appointments   Date Time Provider Department Center   5/22/2019  9:00 AM [facility] None   5/24/2019  2:15 PM Humberto Barone MD [facility] None   6/5/2019 10:15 AM Pamella Fontaine APRN [facility] None   11/12/2019 10:20 AM [facility] None   11/18/2019  3:30 PM Radha Haji MD [facility] None

## 2019-05-16 RX ORDER — INSULIN GLARGINE 300 U/ML
INJECTION, SOLUTION SUBCUTANEOUS
Qty: 27 ML | Refills: 0 | Status: SHIPPED | OUTPATIENT
Start: 2019-05-16 | End: 2019-06-05 | Stop reason: SDUPTHER

## 2019-05-17 DIAGNOSIS — E55.9 VITAMIN D DEFICIENCY: ICD-10-CM

## 2019-05-17 DIAGNOSIS — E78.5 HYPERLIPIDEMIA, UNSPECIFIED HYPERLIPIDEMIA TYPE: ICD-10-CM

## 2019-05-17 DIAGNOSIS — E11.65 TYPE 2 DIABETES MELLITUS WITH HYPERGLYCEMIA, WITHOUT LONG-TERM CURRENT USE OF INSULIN (HCC): Primary | ICD-10-CM

## 2019-05-17 DIAGNOSIS — E34.9 TESTOSTERONE DEFICIENCY: ICD-10-CM

## 2019-05-17 DIAGNOSIS — N40.0 BENIGN PROSTATIC HYPERPLASIA WITHOUT LOWER URINARY TRACT SYMPTOMS: ICD-10-CM

## 2019-05-22 ENCOUNTER — LAB (OUTPATIENT)
Dept: ENDOCRINOLOGY | Age: 74
End: 2019-05-22

## 2019-05-22 DIAGNOSIS — E55.9 VITAMIN D DEFICIENCY: ICD-10-CM

## 2019-05-22 DIAGNOSIS — E34.9 TESTOSTERONE DEFICIENCY: ICD-10-CM

## 2019-05-22 DIAGNOSIS — E11.65 TYPE 2 DIABETES MELLITUS WITH HYPERGLYCEMIA, WITHOUT LONG-TERM CURRENT USE OF INSULIN (HCC): ICD-10-CM

## 2019-05-22 DIAGNOSIS — N40.0 BENIGN PROSTATIC HYPERPLASIA WITHOUT LOWER URINARY TRACT SYMPTOMS: ICD-10-CM

## 2019-05-22 DIAGNOSIS — E78.5 HYPERLIPIDEMIA, UNSPECIFIED HYPERLIPIDEMIA TYPE: ICD-10-CM

## 2019-05-22 RX ORDER — LIRAGLUTIDE 6 MG/ML
INJECTION SUBCUTANEOUS
Qty: 9 ML | Refills: 0 | Status: SHIPPED | OUTPATIENT
Start: 2019-05-22 | End: 2019-06-05 | Stop reason: SDUPTHER

## 2019-05-24 ENCOUNTER — OFFICE VISIT (OUTPATIENT)
Dept: SLEEP MEDICINE | Facility: HOSPITAL | Age: 74
End: 2019-05-24
Attending: INTERNAL MEDICINE

## 2019-05-24 VITALS
HEIGHT: 70 IN | SYSTOLIC BLOOD PRESSURE: 154 MMHG | OXYGEN SATURATION: 95 % | WEIGHT: 277 LBS | HEART RATE: 50 BPM | DIASTOLIC BLOOD PRESSURE: 70 MMHG | BODY MASS INDEX: 39.65 KG/M2

## 2019-05-24 DIAGNOSIS — E66.9 OBESITY (BMI 30-39.9): ICD-10-CM

## 2019-05-24 DIAGNOSIS — R68.2 DRY MOUTH: ICD-10-CM

## 2019-05-24 DIAGNOSIS — G47.33 OBSTRUCTIVE SLEEP APNEA: Primary | ICD-10-CM

## 2019-05-24 DIAGNOSIS — R06.00 DYSPNEA, UNSPECIFIED TYPE: ICD-10-CM

## 2019-05-24 LAB
25(OH)D3+25(OH)D2 SERPL-MCNC: 36.7 NG/ML (ref 30–100)
ALBUMIN SERPL-MCNC: 4.3 G/DL (ref 3.5–5.2)
ALBUMIN/GLOB SERPL: 1.7 G/DL
ALP SERPL-CCNC: 37 U/L (ref 39–117)
ALT SERPL-CCNC: 22 U/L (ref 1–41)
AST SERPL-CCNC: 13 U/L (ref 1–40)
BILIRUB SERPL-MCNC: 0.3 MG/DL (ref 0.2–1.2)
BUN SERPL-MCNC: 39 MG/DL (ref 8–23)
BUN/CREAT SERPL: 22.5 (ref 7–25)
C PEPTIDE SERPL-MCNC: 11.5 NG/ML (ref 1.1–4.4)
CALCIUM SERPL-MCNC: 10.7 MG/DL (ref 8.6–10.5)
CHLORIDE SERPL-SCNC: 100 MMOL/L (ref 98–107)
CHOLEST SERPL-MCNC: 142 MG/DL (ref 0–200)
CO2 SERPL-SCNC: 28.5 MMOL/L (ref 22–29)
CREAT SERPL-MCNC: 1.73 MG/DL (ref 0.76–1.27)
GLOBULIN SER CALC-MCNC: 2.5 GM/DL
GLUCOSE SERPL-MCNC: 223 MG/DL (ref 65–99)
HBA1C MFR BLD: 8 % (ref 4.8–5.6)
HCT VFR BLD AUTO: 48.1 % (ref 37.5–51)
HDLC SERPL-MCNC: 41 MG/DL (ref 40–60)
HGB BLD-MCNC: 14.3 G/DL (ref 13–17.7)
INTERPRETATION: NORMAL
LDLC SERPL CALC-MCNC: 54 MG/DL (ref 0–100)
Lab: NORMAL
POTASSIUM SERPL-SCNC: 4.8 MMOL/L (ref 3.5–5.2)
PROT SERPL-MCNC: 6.8 G/DL (ref 6–8.5)
PSA SERPL-MCNC: 2.5 NG/ML (ref 0–4)
SHBG SERPL-SCNC: 33.7 NMOL/L (ref 19.3–76.4)
SODIUM SERPL-SCNC: 141 MMOL/L (ref 136–145)
TESTOST FREE SERPL-MCNC: 39.7 PG/ML (ref 6.6–18.1)
TESTOST SERPL-MCNC: 1486 NG/DL (ref 264–916)
TRIGL SERPL-MCNC: 236 MG/DL (ref 0–150)
VLDLC SERPL CALC-MCNC: 47.2 MG/DL

## 2019-05-24 PROCEDURE — G0463 HOSPITAL OUTPT CLINIC VISIT: HCPCS

## 2019-05-24 NOTE — PROGRESS NOTES
[facility] SLEEP MEDICINE  [address] [facility]  3rd Floor  [facility] [address]  [phone]    PCP: Radha Haji MD    Reason for visit:  Sleep disorders: BRUCE, PLMD    James is a 74 y.o.male who was seen in the [facility] today. He reports SOA for past 4-5 months. Difficulty laying flat in bed, though some better lately. He gets soa walking short distance - moderate exertion. He had a cough, but now gone. He has chronic LE edema. Quit smoking 1984. Previous 1/2 ppd. He denies wheezing. Dr. Haji had him increase his lasix dose and his soa is better.  He uses CPAP from 11p to 8a. He uses whenever he sleeps. Using ffm. Fits well. Very dry mouth. Using pramipexole 0.25 mg qhs for RLS with control of symptoms.  [place] Sleepiness Scale is 6. Caffeine 1 per day. Alcohol 0 per week.    James  reports that he has quit smoking. He has never used smokeless tobacco.    Pertinent Positive Review of Systems of nasal congestion, pnd, soa, cough  Rest of Review of Systems was negative as recorded in Sleep Questionnaire.    Patient  has a past medical history of Colon polyps, Coronary artery disease, Hypertension, Sleep apnea, Testosterone deficiency, and Type 2 diabetes mellitus (CMS/[facility]).     Current Medications:    Current Outpatient Medications:   •  aspirin 81 MG EC tablet, Take 1 tablet by mouth Daily., Disp: , Rfl:   •  Cholecalciferol (VITAMIN D PO), Take 2,000 Units by mouth Daily., Disp: , Rfl:   •  escitalopram (LEXAPRO) 5 MG tablet, Take 1 tablet by mouth Daily., Disp: 90 tablet, Rfl: 3  •  fenofibrate (TRICOR) 145 MG tablet, Take 1 tablet by mouth Daily., Disp: 30 tablet, Rfl: 0  •  furosemide (LASIX) 40 MG tablet, Take 1.5 tablets by mouth Daily., Disp: 135 tablet, Rfl: 3  •  glucose blood test strip, Test 4 times daily, Disp: 200 each, Rfl: 0  •  Insulin Pen Needle 31G X 5 MM misc, Use to inject insulin 2 x daily, Disp: 100 each, Rfl: 0  •  Insulin Pen Needle 31G X 8 MM misc, Use to  "inject 2 times daily, Disp: 100 each, Rfl: 5  •  losartan (COZAAR) 25 MG tablet, Take 25 mg by mouth daily., Disp: , Rfl:   •  LUMIGAN 0.01 % ophthalmic drops, INSTILL 1 DROP IN OU QHS, Disp: , Rfl: 5  •  metoprolol succinate XL (TOPROL-XL) 50 MG 24 hr tablet, TAKE 1 AND 1/2 TABLETS BY MOUTH TWICE DAILY, Disp: , Rfl:   •  nateglinide (STARLIX) 120 MG tablet, TAKE 1 TABLET BY MOUTH THREE TIMES DAILY BEFORE MEALS, Disp: 90 tablet, Rfl: 0  •  potassium chloride (K-DUR) 10 MEQ CR tablet, Take 10 mEq by mouth 2 (Two) Times a Day., Disp: , Rfl:   •  pramipexole (MIRAPEX) 0.25 MG tablet, Take 1 tablet by mouth Daily., Disp: 90 tablet, Rfl: 3  •  rosuvastatin (CRESTOR) 20 MG tablet, Take 20 mg by mouth daily., Disp: , Rfl:   •  Syringe/Needle, Disp, (B-D 3CC LUER-CHAPIS SYR 23GX1\") 23G X 1\" 3 ML misc, USE AS DIRECTED WITH TESTOSTERONE INJECTION, Disp: 6 each, Rfl: 0  •  Testosterone Cypionate (DEPOTESTOTERONE CYPIONATE) 200 MG/ML injection, 1 cc im every 14 days, Disp: 2 mL, Rfl: 0  •  TOUJEO SOLOSTAR 300 UNIT/ML solution pen-injector, INJECT 75 UNITS UNDER THE SKIN DAILY, Disp: 27 mL, Rfl: 0  •  VICTOZA 18 MG/3ML solution pen-injector injection, ADMINISTER 1.8 MG UNDER THE SKIN DAILY, Disp: 9 mL, Rfl: 0  •  VICTOZA 18 MG/3ML solution pen-injector injection, ADMINISTER 1.8 MG UNDER THE SKIN DAILY, Disp: 9 mL, Rfl: 0   also entered in Sleep Questionnaire         Vital Signs: /70   Pulse 50   Ht 177.8 cm (70\")   Wt 126 kg (277 lb)   SpO2 95%   BMI 39.75 kg/m²     Body mass index is 39.75 kg/m².       Tongue: normal      Dentition: good       Pharynx: Posterior pharyngeal pillars are narrow   Mallampatti: IV (only hard palate visible)        General: Alert. Cooperative. Well developed. No acute distress.             Head:  Normocephalic. Symmetrical. Atraumatic.              Nose: No septal deviation. No drainage.          Throat: No oral lesions. No thrush. Moist mucous membranes.    Chest Wall:  Normal shape. " Symmetric expansion with respiration. No tenderness.             Neck:  Trachea midline.           Lungs:  Clear to auscultation bilaterally. No wheezes. No rhonchi. No rales. Respirations regular, even and unlabored.            Heart:  Regular rhythm and normal rate. Normal S1 and S2. No murmur.     Abdomen:  Soft, non-tender and non-distended. Normal bowel sounds. No masses.  Extremities:  Moves all extremities well. No edema.    Psychiatric: Normal mood and affect.    Study:    · Diagnostic PSG Advanced Sleep Disorder 2009- AHI 74.    ·  Titration 5/12/15 Titration study from 10:29 p.m. to 6:26 a.m. Sleep efficiency 81% with 6.48 hours of total sleep time. Sleep distribution showed decreased amounts of slow-wave sleep and REM sleep. The apnea-hypopnea index was decreased with application of positive airway pressure device. CPAP was increased all the way up to 20 cm. However persistent apneas-hypopneas noted even at this pressure setting. At this pressure setting the patient had some difficulty with exhalation. Due to inadequate treatment of the sleep disorder breathing with the highest CPAP pressure, a bilevel device and 24/20 had to be employed. The patient did achieve supine position sleep as well as REM sleep. Several PVCs were noted throughout the night. Arousal index was high at 35. The patient's PLM index was very high at 47 and PLM arousal index was also extremely high at 20.8.     Testing:  · Compliance 100% average usage 8 hours 42 minutes AHI 1.5 with average CPAP pressure 12 to 13 cm.  Quarter CPAP between 12 and 20 cm    DME Company: [employer]    Impression:  1. Obstructive sleep apnea    2. Obesity (BMI 30-39.9)    3. Dyspnea, unspecified type    4. Dry mouth        Plan:  Jaems is compliant.  Advised to change his mask and supplies regularly.  Only occasional air leaks when he moves.    He reports a very dry mouth.  He may need to use a chinstrap.  He can also try changing the style of his full  facemask.    Patient reports orthopnea and dysnea, likely fluid overload. Pending further cardiac ruiz. If persists can see at [facility]    I reiterated the importance of effective treatment of obstructive sleep apnea with PAP machine.  Cardiovascular health risks of untreated sleep apnea were again reviewed.  Patient was asked to remain cautious if there is persistent hypersomnolence. The benefit of weight loss in reducing severity of obstructive sleep apnea was discussed.  Patient would benefit from adhering to a strict diet to achieve ideal BMI.     Change of PAP supplies regularly is important for effective use.  Change of cushion on the mask or plugs on nasal pillows along with disposable filters once every month and change of mask frame, tubing, headgear and Velcro straps every 6 months at the minimum was reiterated.    This patient is compliant with PAP machine and benefits from its use.  Apnea hypopneas index is corrected/improved.  Daytime hypersomnolence has resolved.     Patient will follow up in this clinic in 1 year APRN    Thank you for allowing me to participate in your patient's care.    Humberto Barone MD    Part of this note may be an electronic transcription/translation of spoken language to printed text using the Dragon Dictation System.

## 2019-06-02 NOTE — PROGRESS NOTES
"SURGERY  1945  James Mckeon     Patient's Chief Complaint: Rectal bleeding     HPI     Patient is a delightful, funny 73 y.o. male who presents with rectal bleeding.  He describes this as occurring about 2 weeks ago, which was preceded by an episode which lasted almost a week of abdominal pain, abdominal rumbling which was so loud but others could hear it, he said it would rumble around and then he would just have shooting out projectile diarrhea.  He tells me that his son had it as well, though his wife does not seem to be able to corroborate this.  He did not have a fever associated with it.  He said he was just \"sick\".  Towards the end of the diarrhea, he began to see a little blood in the stool, then at the end, had an episode where there was dripping of blood in the toilet and then it stopped.  He said it felt like there was something down at his rectum, and this is all consistent with a hemorrhoidal bleed.    He actually had fairly significant bleeding last July, having presented thru the ER, with 8 episodes prior/during admission.  It is pure blood, not mixed with stool.  It is not really associated with abdominal pain.  The character of it is bright red, although he had a darker stool the time before last, with less volume.   He wasn't symptomatic with SOB, lightheadedness or weakness.  His hemoglobin went from 14 to at 12.4.  He did have a c scope while admitted, 7/31/18 and it showed diverticulosis, without evidence of a singular bleeding tic, with green bile in the transverse colon and at the anastomosis.  Blood was evident up to the splenic flexure,.  He had large hemorrhoids, internal and external without evidence of bleed.  He did desat requiring an LMA by Dr Pettit.  Bleeding scan during that admission was negative.       He has a history of a right colon resection, for an endoscopically unresectable polyp that was bordering on in situ cancer.  Following that, he underwent a right kidney " "resection for oncocytoma.  In 2010, he underwent repair of multiple recurrent hernias, via a component separation, with retro-muscular Strattice mesh, 16 x 10 cm.  Thus, happily, if we have to go back in and operate, he does not have synthetic mesh that would need to be traversed.     In 2014, he had a colonoscopy showing an anastomotic ulcer.  He is presently on aspirin, but no other blood thinners.     Medical History        Past Medical History:   Diagnosis Date   • Colon polyps     • Coronary artery disease     • Hypertension     • Sleep apnea     • Testosterone deficiency     • Type 2 diabetes mellitus (CMS/HCC)           Surgical History         Past Surgical History:   Procedure Laterality Date   • CARDIAC CATHETERIZATION       • CHOLECYSTECTOMY       • COLECTOMY PARTIAL / TOTAL       • COLONOSCOPY N/A 1/29/2018     Procedure: COLONOSCOPY TO ANASTOMOSIS WITH COLD BIOPSY POLYPECTOMY;  Surgeon: Olimpia Blanc MD;  Location: [facility] ENDOSCOPY;  Service:    • CORONARY ARTERY BYPASS GRAFT       • HERNIA REPAIR       • NEPHRECTOMY                   Family History   Problem Relation Age of Onset   • Adopted: Yes   • Malig Hyperthermia Neg Hx        Social History            Social History   • Marital status:        Spouse name: N/A   • Number of children: N/A   • Years of education: N/A          Occupational History   • Not on file.             Social History Main Topics   • Smoking status: Former Smoker   • Smokeless tobacco: Not on file         Comment: 50 yrs ago   • Alcohol use No   • Drug use: No   • Sexual activity: Not on file           Other Topics Concern   • Not on file          Social History Narrative   • No narrative on file      Occupation/Additional Social Hx: Is , with his wife \"just moving back and with me \"        Current Facility-Administered Medications:   •  acetaminophen (TYLENOL) tablet 650 mg, 650 mg, Oral, Q4H PRN, UMU Chung  •  metoprolol succinate XL (TOPROL-XL) 24 " "hr tablet 75 mg, 75 mg, Oral, Q12H, Manuela Islas APRN  •  nitroglycerin (NITROSTAT) SL tablet 0.4 mg, 0.4 mg, Sublingual, Q5 Min PRN, Manuela Islas APRN  •  ondansetron (ZOFRAN) tablet 4 mg, 4 mg, Oral, Q6H PRN **OR** ondansetron ODT (ZOFRAN-ODT) disintegrating tablet 4 mg, 4 mg, Oral, Q6H PRN **OR** ondansetron (ZOFRAN) injection 4 mg, 4 mg, Intravenous, Q6H PRN, Manuela Islas APRN  •  pramipexole (MIRAPEX) tablet 0.25 mg, 0.25 mg, Oral, Daily, Manuela Islas APRN  •  sodium chloride 0.9 % flush 1-10 mL, 1-10 mL, Intravenous, PRN, Manuela Islas APRN  •  sodium chloride 0.9 % flush 10 mL, 10 mL, Intravenous, PRN, Edgar Whitman MD  •  Insert peripheral IV, , , Once **AND** sodium chloride 0.9 % flush 10 mL, 10 mL, Intravenous, PRN, TRAE Prieto III  •  sodium chloride 0.9 % infusion, 125 mL/hr, Intravenous, Continuous, Robe Brown MD, Last Rate: 125 mL/hr at 07/30/18 0454, 125 mL/hr at 07/30/18 0454     No Known Allergies     Preventative Medicine  Colonoscopy: 2018     Review of Systems   Constitutional: Positive for fatigue.   Respiratory: Positive for shortness of breath (with anxiety).    Cardiovascular: Negative for chest pain.   Gastrointestinal: Positive for anal bleeding, blood in stool and rectal pain. Negative for abdominal pain.   All other systems reviewed and are negative.        Vitals          Vitals:     07/30/18 0233 07/30/18 0420 07/30/18 0421 07/30/18 0424   BP: 156/86 141/84 134/80 140/88   BP Location: Right arm Right arm Right arm Right arm   Patient Position: Lying Lying Standing Standing   Pulse: 81         Resp: 18         Temp: 98.3 °F (36.8 °C)         TempSrc: Oral         SpO2: 94%         Weight: 132 kg (290 lb 9.1 oz)         Height: 177.8 cm (70\")                  PHYSICAL EXAM:    There were no vitals taken for this visit.       Constitutional: well developed, well nourished  Eyes: sclera nonicteric, conjunctiva not injected or edematous  ENMT: Hearing intact, " trachea midline, thyroid without masses  CVS: RRR, no murmur, peripheral edema not present  Respiratory: CTA, normal respiratory effort   Gastrointestinal: no hepatosplenomegaly, abdomen distended, moderately tight, subcostal incisional scar, midline incisional scar, abdominal hernia not detected  Genitourinary: inguinal hernia not detected  Musculoskeletal: gait slightly slowed, muscle mass normal  Skin: warm and dry, no rashes visible  Neurological: awake and alert, seems to have reasonable capacity for understanding for medical decision making  Psychiatric: appears to have reasonable judgement, pleasant  Lymphatics: no cervical adenopathy     Radiographic Findings 7/2018: CT scan shows no definite acute abdominal pathology, and status post right nephrectomy with 2 new exophytic hyperdense lesions of the left kidney, recommendation of follow-up ultrasound to exclude neoplasm.  There is diverticulosis of the colon without evidence of diverticulitis.  Review of his CT scan shows that his anastomosis is in the far right upper outer quadrant, somewhat lateral      IMPRESSION:  · Lower GI bleed, this time seeming like it is the result of diarrhea from a viral entity, with a hemorrhoidal bleed.  · Admission 7/2018 with presumed diverticular bleed.    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  Ultrasound recommended  · Diabetes with hemoglobin A1c 7.4, on 2J0, mucosa, Starlix, insulin  · Hyperlipidemia on Crestor  · Anxiety on Lexapro  · Chronic kidney disease stage III  · Coronary artery disease, status post coronary artery bypass graft  · Sleep apnea     PLAN:  · Stat nuclear bleeding scan to assess for the location of the bleed if he has a recurrent episode.  I wrote this down for them today.  The other opportunity is a CT angiogram,  but that usually requires a more brisk bleed.  In addition, I favor a nuclear scanover a CT angiogram  in the context of his kidney disease and prior kidney removal, combined with diabetes.  I discussed with him the possibility of interventional radiology for embolization, but again with his kidney disease I don't think it's ideal, and we would have to transfer him, which she is opposed to.  If the patient recurs, we could consider a flex will sigmoidoscopy with banding of the internal hemorrhoids  · Again I am not anxious to do this unless he has more persistent symptoms.  · Should the patient not quit bleeding, will ultimately need to have a resection.  I understand that he is not enthusiastic about this, Cynthia, but we cannot allow him to continue to bleed.  We'll see how he does tonight, and the colonoscopy prior to suggesting any further that we go in that direction.   · I have apologized for the fact that he could get in to see me for 2 weeks.  They complemented the staff on asking questions and make sure that it would be okay to wait for this 2 weeks.  He is extremely likable but I did tell him that I could not condone his practice of entering an ER and telling them that  he has chest pain in order to get seen more quickly for a leg issue, which apparently he has done.  Having said that, he is 1 of my favorite patients.       Olimpia Blanc MD  7:26 PM

## 2019-06-03 ENCOUNTER — OFFICE VISIT (OUTPATIENT)
Dept: SURGERY | Facility: CLINIC | Age: 74
End: 2019-06-03

## 2019-06-03 VITALS — HEIGHT: 70 IN | HEART RATE: 84 BPM | OXYGEN SATURATION: 94 % | BODY MASS INDEX: 40.37 KG/M2 | WEIGHT: 282 LBS

## 2019-06-03 DIAGNOSIS — N18.30 CHRONIC KIDNEY DISEASE, STAGE III (MODERATE) (HCC): ICD-10-CM

## 2019-06-03 DIAGNOSIS — F41.9 ANXIETY: ICD-10-CM

## 2019-06-03 DIAGNOSIS — I27.20 PULMONARY HYPERTENSION, MODERATE TO SEVERE (HCC): ICD-10-CM

## 2019-06-03 DIAGNOSIS — I10 BENIGN ESSENTIAL HYPERTENSION: Primary | ICD-10-CM

## 2019-06-03 DIAGNOSIS — G47.33 OBSTRUCTIVE SLEEP APNEA SYNDROME: ICD-10-CM

## 2019-06-03 DIAGNOSIS — Z85.038 HISTORY OF COLON CANCER: ICD-10-CM

## 2019-06-03 DIAGNOSIS — K92.2 LOWER GI BLEED: ICD-10-CM

## 2019-06-03 PROCEDURE — 99214 OFFICE O/P EST MOD 30 MIN: CPT | Performed by: SURGERY

## 2019-06-03 RX ORDER — FENOFIBRATE 145 MG/1
145 TABLET, COATED ORAL DAILY
Qty: 90 TABLET | Refills: 0 | Status: SHIPPED | OUTPATIENT
Start: 2019-06-03 | End: 2019-06-05 | Stop reason: SDUPTHER

## 2019-06-03 RX ORDER — TAMSULOSIN HYDROCHLORIDE 0.4 MG/1
1 CAPSULE ORAL DAILY
COMMUNITY
Start: 2019-06-02

## 2019-06-05 ENCOUNTER — OFFICE VISIT (OUTPATIENT)
Dept: ENDOCRINOLOGY | Age: 74
End: 2019-06-05

## 2019-06-05 VITALS
BODY MASS INDEX: 40.37 KG/M2 | SYSTOLIC BLOOD PRESSURE: 138 MMHG | DIASTOLIC BLOOD PRESSURE: 78 MMHG | WEIGHT: 282 LBS | HEIGHT: 70 IN

## 2019-06-05 DIAGNOSIS — E55.9 VITAMIN D DEFICIENCY: ICD-10-CM

## 2019-06-05 DIAGNOSIS — I10 BENIGN ESSENTIAL HYPERTENSION: ICD-10-CM

## 2019-06-05 DIAGNOSIS — E11.65 UNCONTROLLED TYPE 2 DIABETES MELLITUS WITH HYPERGLYCEMIA (HCC): ICD-10-CM

## 2019-06-05 DIAGNOSIS — E34.9 TESTOSTERONE DEFICIENCY: ICD-10-CM

## 2019-06-05 DIAGNOSIS — N28.9 RENAL INSUFFICIENCY: Primary | ICD-10-CM

## 2019-06-05 DIAGNOSIS — E78.2 MIXED HYPERLIPIDEMIA: ICD-10-CM

## 2019-06-05 PROCEDURE — 99214 OFFICE O/P EST MOD 30 MIN: CPT | Performed by: NURSE PRACTITIONER

## 2019-06-05 RX ORDER — INSULIN GLARGINE 300 U/ML
75 INJECTION, SOLUTION SUBCUTANEOUS DAILY
Qty: 27 ML | Refills: 1 | Status: SHIPPED | OUTPATIENT
Start: 2019-06-05 | End: 2019-12-30

## 2019-06-05 RX ORDER — GLIMEPIRIDE 4 MG/1
4 TABLET ORAL 2 TIMES DAILY WITH MEALS
Qty: 180 TABLET | Refills: 1 | Status: SHIPPED | OUTPATIENT
Start: 2019-06-05 | End: 2020-03-08

## 2019-06-05 RX ORDER — GLIMEPIRIDE 4 MG/1
4 TABLET ORAL 2 TIMES DAILY WITH MEALS
Qty: 60 TABLET | Refills: 5 | Status: SHIPPED | OUTPATIENT
Start: 2019-06-05 | End: 2019-06-05 | Stop reason: SDUPTHER

## 2019-06-05 RX ORDER — LIRAGLUTIDE 6 MG/ML
1.8 INJECTION SUBCUTANEOUS DAILY
Qty: 27 ML | Refills: 1 | Status: SHIPPED | OUTPATIENT
Start: 2019-06-05 | End: 2019-12-06

## 2019-06-05 RX ORDER — TESTOSTERONE CYPIONATE 200 MG/ML
INJECTION, SOLUTION INTRAMUSCULAR
Qty: 10 ML | Refills: 1 | Status: SHIPPED | OUTPATIENT
Start: 2019-06-05 | End: 2019-12-13 | Stop reason: SDUPTHER

## 2019-06-05 NOTE — PROGRESS NOTES
"Subjective   James Mckeon is a 74 y.o. male is here today for follow-up.  Chief Complaint   Patient presents with   • Diabetes     recent labs, testing BG zero times daily, pt brought meter   • Hyperlipidemia   • Hypertension   • Hypogonadism   • Vitamin D Deficiency     /78   Ht 177.8 cm (70\")   Wt 128 kg (282 lb)   BMI 40.46 kg/m²   Current Outpatient Medications on File Prior to Visit   Medication Sig   • aspirin 81 MG EC tablet Take 1 tablet by mouth Daily.   • Cholecalciferol (VITAMIN D PO) Take 2,000 Units by mouth Daily.   • escitalopram (LEXAPRO) 5 MG tablet Take 1 tablet by mouth Daily.   • fenofibrate (TRICOR) 145 MG tablet Take 1 tablet by mouth Daily.   • furosemide (LASIX) 40 MG tablet Take 1.5 tablets by mouth Daily.   • glucose blood test strip Test 4 times daily   • Insulin Pen Needle 31G X 5 MM misc Use to inject insulin 2 x daily   • losartan (COZAAR) 25 MG tablet Take 25 mg by mouth daily.   • LUMIGAN 0.01 % ophthalmic drops INSTILL 1 DROP IN OU QHS   • metoprolol succinate XL (TOPROL-XL) 50 MG 24 hr tablet TAKE 1 AND 1/2 TABLETS BY MOUTH TWICE DAILY   • potassium chloride (K-DUR) 10 MEQ CR tablet Take 10 mEq by mouth 2 (Two) Times a Day.   • pramipexole (MIRAPEX) 0.25 MG tablet Take 1 tablet by mouth Daily.   • rosuvastatin (CRESTOR) 20 MG tablet Take 20 mg by mouth daily.   • Syringe/Needle, Disp, (B-D 3CC LUER-CHAPIS SYR 23GX1\") 23G X 1\" 3 ML misc USE AS DIRECTED WITH TESTOSTERONE INJECTION   • tamsulosin (FLOMAX) 0.4 MG capsule 24 hr capsule Take 1 capsule by mouth Daily.   • Testosterone Cypionate (DEPOTESTOTERONE CYPIONATE) 200 MG/ML injection 1 cc im every 14 days   • TOUJEO SOLOSTAR 300 UNIT/ML solution pen-injector INJECT 75 UNITS UNDER THE SKIN DAILY   • VICTOZA 18 MG/3ML solution pen-injector injection ADMINISTER 1.8 MG UNDER THE SKIN DAILY   • [DISCONTINUED] nateglinide (STARLIX) 120 MG tablet TAKE 1 TABLET BY MOUTH THREE TIMES DAILY BEFORE MEALS   • fenofibrate (TRICOR) 145 " MG tablet TAKE 1 TABLET BY MOUTH DAILY   • Insulin Pen Needle 31G X 8 MM misc Use to inject 2 times daily   • [DISCONTINUED] VICTOZA 18 MG/3ML solution pen-injector injection ADMINISTER 1.8 MG UNDER THE SKIN DAILY     No current facility-administered medications on file prior to visit.      Family History   Adopted: Yes   Problem Relation Age of Onset   • Malig Hyperthermia Neg Hx      Social History     Tobacco Use   • Smoking status: Former Smoker   • Smokeless tobacco: Never Used   • Tobacco comment: 50 yrs ago   Substance Use Topics   • Alcohol use: No   • Drug use: No     Allergies   Allergen Reactions   • Toujeo Max Solostar [Insulin Glargine] Cough and Unknown (See Comments)     Sinus drainage   • Victoza [Liraglutide] Other (See Comments) and Cough     Sinus drainage       History of Present Illness   Encounter Diagnoses   Name Primary?   • Renal insufficiency Yes   • Vitamin D deficiency    • Testosterone deficiency    • Uncontrolled type 2 diabetes mellitus with hyperglycemia (CMS/[facility])    • Mixed hyperlipidemia    • Benign essential hypertension      74-year-old male patient here today for routine follow-up visit.  He has been seen for the above-mentioned problems.  He is checking his blood sugars 2-3 times daily.  His meter download was reviewed.  He has been out of test strips and has been unable to check blood sugars consistently so he bought a new blood glucose meter.  Reji was reviewed at today's visit.  He had his labs drawn immediately following a testosterone injection.  He feels low if his blood sugars are in the 70 range.  He has been in [place] for the past several months.  His medication list was reviewed and updated.  He has not been taking his nateglinide consistently with lunchtime meals.  He states his highest blood sugars are typically before dinner and patient was explained that this is a result of skipping the nateglinide.  We discussed other options where he would not have to take  medications at lunch.      The following portions of the patient's history were reviewed and updated as appropriate: allergies, current medications, past family history, past medical history, past social history, past surgical history and problem list.    Review of Systems   Constitutional: Negative for fatigue.   HENT: Negative for trouble swallowing.    Eyes: Negative for visual disturbance.   Cardiovascular: Negative for leg swelling.   Endocrine: Negative for polyuria.   Skin: Negative for wound.   Neurological: Negative for numbness.       Objective   Physical Exam   Constitutional: He is oriented to person, place, and time. He appears well-developed and well-nourished. No distress.   HENT:   Head: Normocephalic and atraumatic.   Right Ear: External ear normal.   Left Ear: External ear normal.   Nose: Nose normal.   Eyes: Pupils are equal, round, and reactive to light. Right eye exhibits no discharge. Left eye exhibits no discharge.   Neck: Normal range of motion. Neck supple. Carotid bruit is not present. No tracheal deviation, no edema and no erythema present. No thyromegaly present.   Cardiovascular: Normal rate, regular rhythm, normal heart sounds and intact distal pulses. Exam reveals no gallop and no friction rub.   No murmur heard.  Pulmonary/Chest: Effort normal and breath sounds normal. No respiratory distress. He has no wheezes. He has no rales.   Abdominal: He exhibits no distension. There is no tenderness.   Musculoskeletal: Normal range of motion. He exhibits edema. He exhibits no deformity.   Trace edema in left lower ext   Lymphadenopathy:     He has no cervical adenopathy.   Neurological: He is alert and oriented to person, place, and time. Coordination normal.   Skin: Skin is warm and dry. No rash noted. He is not diaphoretic. No erythema. No pallor.   Psychiatric: He has a normal mood and affect. His behavior is normal. Judgment and thought content normal.   Nursing note and vitals  reviewed.        Assessment/Plan   Problems Addressed this Visit        Cardiovascular and Mediastinum    Benign essential hypertension    Hyperlipidemia       Digestive    Vitamin D deficiency       Endocrine    Testosterone deficiency    Uncontrolled type 2 diabetes mellitus (CMS/[facility])    Relevant Medications    glimepiride (AMARYL) 4 MG tablet      Other Visit Diagnoses     Renal insufficiency    -  Primary      In summary, patient was seen and examined.  A dennis was reviewed at today's visit.  His medication refills will be sent to his pharmacy per his request.  His blood sugars were reviewed.  His highest blood sugar reading has been 357.  He does state he has had a low blood sugar in the 60 range.  He is been advised to monitor blood sugars more closely especially with the medication change.  He will follow-up in our office in 4 months with labs prior.  He has been advised to contact the office if his blood sugars fail to improve or if he has any low blood sugars.  He will continue his insulin and Victoza.  He was educated today regarding mechanism of action of his current medications    glimiperide 4 mg 1 pill twice daily with food  Do not skip meals  Stop nateglinide for now pending to see how blood sugars do  Contact office if they do not improve goal is less than 150 mg/dl

## 2019-06-05 NOTE — PATIENT INSTRUCTIONS
glimiperide 4 mg 1 pill twice daily with food  Do not skip meals  Stop nateglinide for now pending to see how blood sugars do  Contact office if they do not improve goal is less than 150 mg/dl

## 2019-09-25 DIAGNOSIS — N40.0 BENIGN PROSTATIC HYPERPLASIA WITHOUT LOWER URINARY TRACT SYMPTOMS: ICD-10-CM

## 2019-09-25 DIAGNOSIS — E34.9 TESTOSTERONE DEFICIENCY: ICD-10-CM

## 2019-09-25 DIAGNOSIS — E55.9 VITAMIN D DEFICIENCY: ICD-10-CM

## 2019-09-25 DIAGNOSIS — N28.9 RENAL INSUFFICIENCY: Primary | ICD-10-CM

## 2019-09-25 DIAGNOSIS — E78.2 MIXED HYPERLIPIDEMIA: ICD-10-CM

## 2019-09-25 DIAGNOSIS — E11.65 UNCONTROLLED TYPE 2 DIABETES MELLITUS WITH HYPERGLYCEMIA (HCC): ICD-10-CM

## 2019-10-07 RX ORDER — FENOFIBRATE 145 MG/1
145 TABLET, COATED ORAL DAILY
Qty: 90 TABLET | Refills: 0 | Status: SHIPPED | OUTPATIENT
Start: 2019-10-07 | End: 2019-12-06

## 2019-10-25 ENCOUNTER — RESULTS ENCOUNTER (OUTPATIENT)
Dept: ENDOCRINOLOGY | Age: 74
End: 2019-10-25

## 2019-10-25 DIAGNOSIS — E11.65 UNCONTROLLED TYPE 2 DIABETES MELLITUS WITH HYPERGLYCEMIA (HCC): ICD-10-CM

## 2019-10-25 DIAGNOSIS — E55.9 VITAMIN D DEFICIENCY: ICD-10-CM

## 2019-10-25 DIAGNOSIS — N40.0 BENIGN PROSTATIC HYPERPLASIA WITHOUT LOWER URINARY TRACT SYMPTOMS: ICD-10-CM

## 2019-10-25 DIAGNOSIS — E78.2 MIXED HYPERLIPIDEMIA: ICD-10-CM

## 2019-10-25 DIAGNOSIS — E34.9 TESTOSTERONE DEFICIENCY: ICD-10-CM

## 2019-10-25 DIAGNOSIS — N28.9 RENAL INSUFFICIENCY: ICD-10-CM

## 2019-10-30 ENCOUNTER — TELEPHONE (OUTPATIENT)
Dept: ENDOCRINOLOGY | Age: 74
End: 2019-10-30

## 2019-10-30 NOTE — TELEPHONE ENCOUNTER
Patient said he is having  labs drawn at  Dr. Mary Haji  office on 11-12-19 and asked if he can cancel his lab appt on 12-9-19 and have all labs drawn at same time. He has appt with Pamella on 12-23-19.  Pt - [phone]

## 2019-10-30 NOTE — TELEPHONE ENCOUNTER
I called patient and advised him of Daisha's message and he agreed to keep his appt scheduled for labs on 12-9-19.

## 2019-11-12 DIAGNOSIS — E11.69 CONTROLLED TYPE 2 DIABETES MELLITUS WITH OTHER SPECIFIED COMPLICATION, UNSPECIFIED WHETHER LONG TERM INSULIN USE (HCC): Primary | ICD-10-CM

## 2019-11-12 DIAGNOSIS — E78.5 HYPERLIPIDEMIA, UNSPECIFIED HYPERLIPIDEMIA TYPE: ICD-10-CM

## 2019-11-13 LAB
ALBUMIN SERPL-MCNC: 4.2 G/DL (ref 3.5–5.2)
ALBUMIN/GLOB SERPL: 1.4 G/DL
ALP SERPL-CCNC: 46 U/L (ref 39–117)
ALT SERPL-CCNC: 20 U/L (ref 1–41)
AST SERPL-CCNC: 14 U/L (ref 1–40)
BILIRUB SERPL-MCNC: 0.3 MG/DL (ref 0.2–1.2)
BUN SERPL-MCNC: 28 MG/DL (ref 8–23)
BUN/CREAT SERPL: 16.4 (ref 7–25)
CALCIUM SERPL-MCNC: 9.3 MG/DL (ref 8.6–10.5)
CHLORIDE SERPL-SCNC: 103 MMOL/L (ref 98–107)
CHOLEST SERPL-MCNC: 131 MG/DL (ref 0–200)
CO2 SERPL-SCNC: 29.7 MMOL/L (ref 22–29)
CREAT SERPL-MCNC: 1.71 MG/DL (ref 0.76–1.27)
GLOBULIN SER CALC-MCNC: 3 GM/DL
GLUCOSE SERPL-MCNC: 84 MG/DL (ref 65–99)
HBA1C MFR BLD: 7.6 % (ref 4.8–5.6)
HDLC SERPL-MCNC: 40 MG/DL (ref 40–60)
LDLC SERPL CALC-MCNC: 62 MG/DL (ref 0–100)
POTASSIUM SERPL-SCNC: 5.5 MMOL/L (ref 3.5–5.2)
PROT SERPL-MCNC: 7.2 G/DL (ref 6–8.5)
SODIUM SERPL-SCNC: 145 MMOL/L (ref 136–145)
TRIGL SERPL-MCNC: 146 MG/DL (ref 0–150)
VLDLC SERPL CALC-MCNC: 29.2 MG/DL

## 2019-11-18 ENCOUNTER — OFFICE VISIT (OUTPATIENT)
Dept: INTERNAL MEDICINE | Facility: CLINIC | Age: 74
End: 2019-11-18

## 2019-11-18 VITALS
SYSTOLIC BLOOD PRESSURE: 132 MMHG | OXYGEN SATURATION: 92 % | HEIGHT: 70 IN | WEIGHT: 280 LBS | HEART RATE: 91 BPM | DIASTOLIC BLOOD PRESSURE: 76 MMHG | BODY MASS INDEX: 40.09 KG/M2

## 2019-11-18 DIAGNOSIS — F41.9 ANXIETY: ICD-10-CM

## 2019-11-18 DIAGNOSIS — N18.30 CHRONIC KIDNEY DISEASE, STAGE III (MODERATE) (HCC): ICD-10-CM

## 2019-11-18 DIAGNOSIS — I10 BENIGN ESSENTIAL HYPERTENSION: ICD-10-CM

## 2019-11-18 DIAGNOSIS — E11.65 UNCONTROLLED TYPE 2 DIABETES MELLITUS WITH HYPERGLYCEMIA (HCC): Primary | ICD-10-CM

## 2019-11-18 DIAGNOSIS — E78.2 MIXED HYPERLIPIDEMIA: ICD-10-CM

## 2019-11-18 PROBLEM — K92.2 LOWER GI BLEED: Status: RESOLVED | Noted: 2018-08-01 | Resolved: 2019-11-18

## 2019-11-18 PROCEDURE — G0439 PPPS, SUBSEQ VISIT: HCPCS | Performed by: INTERNAL MEDICINE

## 2019-11-18 PROCEDURE — 99214 OFFICE O/P EST MOD 30 MIN: CPT | Performed by: INTERNAL MEDICINE

## 2019-11-18 NOTE — PATIENT INSTRUCTIONS
Medicare Wellness  Personal Prevention Plan of Service     Date of Office Visit:  2019  Encounter Provider:  Radha Haji MD  Place of Service:  [facility] INTERNAL MEDICINE  Patient Name: James Mckeon  :  1945    As part of the Medicare Wellness portion of your visit today, we are providing you with this personalized preventive plan of services (PPPS). This plan is based upon recommendations of the [place] Preventive Services Task Force (USPSTF) and the Advisory Committee on Immunization Practices (ACIP).    This lists the preventive care services that should be considered, and provides dates of when you are due. Items listed as completed are up-to-date and do not require any further intervention.    Health Maintenance   Topic Date Due   • ZOSTER VACCINE (2 of 2) 2007   • TDAP/TD VACCINES (2 - Td) 2018   • MEDICARE ANNUAL WELLNESS  2019   • DIABETIC FOOT EXAM  2019   • URINE MICROALBUMIN  2019   • HEMOGLOBIN A1C  2020   • DIABETIC EYE EXAM  2020   • LIPID PANEL  2020   • COLONOSCOPY  2022   • INFLUENZA VACCINE  Completed   • PNEUMOCOCCAL VACCINES (65+ LOW/MEDIUM RISK)  Completed   • AAA SCREEN (ONE-TIME)  Completed   • HEPATITIS C SCREENING  Addressed       No orders of the defined types were placed in this encounter.      Return in about 6 months (around 2020) for Recheck.

## 2019-11-18 NOTE — PROGRESS NOTES
Subjective     James Mckeon is a 74 y.o. male who presents for an annual wellness visit as well as check up of dm-2, htn, hld, ckd.      History of Present Illness     Dm-2.  Followed by wendy. Reviewed regimen. Control is fair.    HTN. Control is good with current.    HLD.  Control is good with current regimen.  CKD.  Numbers are stable.   Anxiety.  Good with lexapro.      Review of Systems   Respiratory: Negative.    Cardiovascular: Negative.        The following portions of the patient's history were reviewed and updated as appropriate: allergies, current medications, past family history, past medical history, past social history, past surgical history and problem list.  Health maintenance tab was reviewed and updated with the patient.       Patient Active Problem List    Diagnosis Date Noted   • Pulmonary hypertension, moderate to severe (CMS/HCC) 05/15/2019   • Restless legs syndrome (RLS) 11/13/2018   • History of colon cancer 12/20/2017     Note Last Updated: 1/29/2018     Severe atypia, bordering on carcinoma in situ, s/p resection right colon 2006     • Vitamin D deficiency 02/16/2017   • Uncontrolled type 2 diabetes mellitus (CMS/HCC) 05/26/2016   • Coronary arteriosclerosis in native artery 03/29/2016   • Anxiety 02/25/2016   • Benign essential hypertension 02/25/2016   • Benign prostatic hyperplasia 02/25/2016   • Chronic kidney disease, stage III (moderate) (CMS/HCC) 02/25/2016   • Hyperlipidemia 02/25/2016   • Testosterone deficiency 02/25/2016   • Obstructive sleep apnea syndrome 02/25/2016   • History of coronary artery bypass surgery 05/14/2013       Past Medical History:   Diagnosis Date   • Colon polyps    • Coronary artery disease    • Diverticulosis    • Hypertension    • Sleep apnea    • Testosterone deficiency    • Type 2 diabetes mellitus (CMS/HCC)        Past Surgical History:   Procedure Laterality Date   • APPENDECTOMY N/A    • CARDIAC CATHETERIZATION Left 06/09/2003   • CARDIAC  CATHETERIZATION Left 01/27/2003   • CHOLECYSTECTOMY N/A    • COLONOSCOPY N/A 1/29/2018    Procedure: COLONOSCOPY TO ANASTOMOSIS WITH COLD BIOPSY POLYPECTOMY;  Surgeon: Olimpia Blanc MD;  Location: [facility] ENDOSCOPY;  Service:    • COLONOSCOPY N/A 7/31/2018    Procedure: COLONOSCOPY into cecum and TI;  Surgeon: Olimpia Blanc MD;  Location: [facility] ENDOSCOPY;  Service: General   • COLONOSCOPY W/ POLYPECTOMY N/A 11/10/2014    Anastomotic ulcer, sigmoid polyp, 5 mm-Dr. Olimpia Blanc   • COLONOSCOPY W/ POLYPECTOMY N/A 11/23/2010    Transverse colon polyp, pan diverticulosis, anastomotic ulcer-Dr. Olimpia Blanc   • COLONOSCOPY W/ POLYPECTOMY N/A 07/17/2006    2 cm cecal polyp, 1 cm ascending colon polyp, 5 mm sigmoid polyp, pandiverticulosis-Dr. Olimpia Blanc   • CORONARY ARTERY BYPASS GRAFT N/A 01/28/2003    CABG x3, Dr. Cosmo Gama   • ENDOSCOPY AND COLONOSCOPY N/A 09/05/2007    Sigmoid diverticulosis, small sigmoid polyp, 2 cm hiatal hernia with possible gastritis, small internal hemorrhoid-Dr. Olimpia Blanc   • HEMICOLECTOMY Right 07/25/2006    Open right hemicolectomy, adhesiolysis, umbilical hernia repair-Dr. Olimpia Blanc   • HERNIA REPAIR N/A 12/17/2010    Repair of incarcerated recurrent multiple incisional hernias, musculofascial advancement flaps with Strattice mesh 16 x 10 cm, adhesiolysis-Dr. Olimpia Blanc   • KNEE SURGERY Right    • LAPAROSCOPIC ADRENALECTOMY Right 10/18/2006    Dr. James Serrano   • NEPHRECTOMY Right 10/18/2006    Dr. James Serrano   • ORIF ULNA/RADIUS FRACTURES Left 06/03/2003    Dr. Reynaldo Shea   • UMBILICAL HERNIA REPAIR N/A 07/25/2006    Dr. Olimpia Blanc       Family History   Adopted: Yes   Problem Relation Age of Onset   • Malig Hyperthermia Neg Hx        Social History     Socioeconomic History   • Marital status:      Spouse name: Not on file   • Number of children: Not on file   • Years of education: Not on file   • Highest education level: Not on file   Tobacco Use  "  • Smoking status: Former Smoker   • Smokeless tobacco: Never Used   • Tobacco comment: 50 yrs ago   Substance and Sexual Activity   • Alcohol use: No   • Drug use: No       Current Outpatient Medications on File Prior to Visit   Medication Sig Dispense Refill   • aspirin 81 MG EC tablet Take 1 tablet by mouth Daily.     • Cholecalciferol (VITAMIN D PO) Take 2,000 Units by mouth Daily.     • escitalopram (LEXAPRO) 5 MG tablet Take 1 tablet by mouth Daily. 90 tablet 3   • fenofibrate (TRICOR) 145 MG tablet Take 1 tablet by mouth Daily. 30 tablet 0   • fenofibrate (TRICOR) 145 MG tablet TAKE 1 TABLET BY MOUTH DAILY 90 tablet 0   • furosemide (LASIX) 40 MG tablet Take 1.5 tablets by mouth Daily. 135 tablet 3   • glimepiride (AMARYL) 4 MG tablet Take 1 tablet by mouth 2 (Two) Times a Day With Meals. 180 tablet 1   • glucose blood test strip Test 4 times daily 200 each 0   • Insulin Pen Needle 31G X 5 MM misc Use to inject insulin 2 x daily 200 each 1   • Insulin Pen Needle 31G X 8 MM misc Use to inject 2 times daily 100 each 5   • losartan (COZAAR) 25 MG tablet Take 25 mg by mouth daily.     • LUMIGAN 0.01 % ophthalmic drops INSTILL 1 DROP IN OU QHS  5   • metoprolol succinate XL (TOPROL-XL) 50 MG 24 hr tablet TAKE 1 AND 1/2 TABLETS BY MOUTH TWICE DAILY     • potassium chloride (K-DUR) 10 MEQ CR tablet Take 10 mEq by mouth 2 (Two) Times a Day.     • pramipexole (MIRAPEX) 0.25 MG tablet Take 1 tablet by mouth Daily. 90 tablet 3   • rosuvastatin (CRESTOR) 20 MG tablet Take 20 mg by mouth daily.     • Syringe/Needle, Disp, (B-D 3CC LUER-CHAPIS SYR 23GX1\") 23G X 1\" 3 ML misc USE AS DIRECTED WITH TESTOSTERONE INJECTION 6 each 0   • tamsulosin (FLOMAX) 0.4 MG capsule 24 hr capsule Take 1 capsule by mouth Daily.     • Testosterone Cypionate (DEPOTESTOTERONE CYPIONATE) 200 MG/ML injection 1 cc im every 14 days 10 mL 1   • TOUJEO SOLOSTAR 300 UNIT/ML solution pen-injector Inject 75 Units under the skin into the appropriate area as " "directed Daily. 27 mL 1   • VICTOZA 18 MG/3ML solution pen-injector injection Inject 1.8 mg under the skin into the appropriate area as directed Daily. 27 mL 1     No current facility-administered medications on file prior to visit.        Allergies   Allergen Reactions   • Toujeo Max Solostar [Insulin Glargine] Cough and Unknown (See Comments)     Sinus drainage   • Victoza [Liraglutide] Other (See Comments) and Cough     Sinus drainage       Immunization History   Administered Date(s) Administered   • Fluzone High Dose =>65 Years ([employer] ONLY) 10/05/2015, 10/04/2016, 09/14/2017, 09/12/2018   • Hepatitis A 09/17/2018   • Pneumococcal Conjugate 13-Valent (PCV13) 11/07/2017   • Pneumococcal Polysaccharide (PPSV23) 11/13/2018   • Tdap 01/01/2008       Objective     /76   Pulse 91   Ht 177.8 cm (70\")   Wt 127 kg (280 lb)   SpO2 92%   BMI 40.18 kg/m²     Physical Exam   Constitutional: He is oriented to person, place, and time. He appears well-developed and well-nourished.   HENT:   Head: Normocephalic and atraumatic.   Right Ear: Hearing and tympanic membrane normal.   Left Ear: Hearing and tympanic membrane normal.   Mouth/Throat: No posterior oropharyngeal erythema.   Neck: Neck supple. No thyromegaly present.   Cardiovascular: Normal rate, regular rhythm and normal heart sounds.   No murmur heard.  Pulmonary/Chest: Effort normal and breath sounds normal.   Abdominal: Soft. He exhibits no distension. There is no hepatosplenomegaly. There is no tenderness.    James had a diabetic foot exam performed today.   During the foot exam he had a monofilament test performed (normal).  Skin Integrity  -  His right foot skin is intact.His left foot skin is intact..   Foot Structure and Biomechanics -  His right foot has no hallux valgus and no hammer toes present. His left foot has no hallux valgus and no hammer toes.  Lymphadenopathy:     He has no cervical adenopathy.   Neurological: He is alert and oriented to " person, place, and time.   Skin: Skin is warm and dry.   Psychiatric: He has a normal mood and affect. His speech is normal and behavior is normal. Judgment and thought content normal. Cognition and memory are normal.       Assessment/Plan   James was seen today for medicare wellness-subsequent.    Diagnoses and all orders for this visit:    Uncontrolled type 2 diabetes mellitus with hyperglycemia (CMS/[facility])    Benign essential hypertension    Mixed hyperlipidemia    Chronic kidney disease, stage III (moderate) (CMS/[facility])    Anxiety        Discussion    AWV.  See scanned forms and or computer for airam history, PHQ-9, functional ability questionnaire, cognitive impairment screening.  Direct observation of cognitive abilities:  The patient does not exhibit any impairment in cognitive abilities upon direct observation at today's visit.   These were all reviewed with the patient and the patient was provided with a personal prevention plan of service in patient instructions.  Patient was given advice or information on the following topics:  nutrition, exercise   DM-2.  Continue with endo.  HTN.  Continue current regimen.  HLD. Continue current regimen.  CKD.  Continue NSAID avoidance and plenty of water.     Anxiety.  Continue lexapro.  I have recommended that the patient get the following immunizations:  Shingrix.        Health Maintenance   Topic Date Due   • ZOSTER VACCINE (2 of 2) 04/24/2007   • TDAP/TD VACCINES (2 - Td) 01/01/2018   • MEDICARE ANNUAL WELLNESS  11/13/2019   • DIABETIC FOOT EXAM  11/13/2019   • URINE MICROALBUMIN  11/13/2019   • HEMOGLOBIN A1C  05/12/2020   • DIABETIC EYE EXAM  08/21/2020   • LIPID PANEL  11/12/2020   • COLONOSCOPY  07/31/2022   • INFLUENZA VACCINE  Completed   • PNEUMOCOCCAL VACCINES (65+ LOW/MEDIUM RISK)  Completed   • AAA SCREEN (ONE-TIME)  Completed   • HEPATITIS C SCREENING  Addressed            Future Appointments   Date Time Provider Department Center   12/9/2019 10:00 AM  [facility] None   12/23/2019 11:00 AM Pamella Fontaine APRN [facility] None   5/12/2020  9:50 AM [facility] None   5/18/2020  9:45 AM Kristen Abraham APRN [facility] None   5/19/2020  9:30 AM Radha Haji MD [facility] None

## 2019-11-21 RX ORDER — ESCITALOPRAM OXALATE 5 MG/1
5 TABLET ORAL DAILY
Qty: 90 TABLET | Refills: 0 | Status: SHIPPED | OUTPATIENT
Start: 2019-11-21 | End: 2020-02-24

## 2019-12-03 DIAGNOSIS — I10 BENIGN ESSENTIAL HYPERTENSION: ICD-10-CM

## 2019-12-03 DIAGNOSIS — N18.30 CHRONIC KIDNEY DISEASE, STAGE III (MODERATE) (HCC): ICD-10-CM

## 2019-12-04 RX ORDER — PRAMIPEXOLE DIHYDROCHLORIDE 0.25 MG/1
0.25 TABLET ORAL DAILY
Qty: 90 TABLET | Refills: 0 | Status: SHIPPED | OUTPATIENT
Start: 2019-12-04 | End: 2020-03-09

## 2019-12-06 ENCOUNTER — OFFICE VISIT (OUTPATIENT)
Dept: ENDOCRINOLOGY | Age: 74
End: 2019-12-06

## 2019-12-06 ENCOUNTER — TELEPHONE (OUTPATIENT)
Dept: ENDOCRINOLOGY | Age: 74
End: 2019-12-06

## 2019-12-06 VITALS
OXYGEN SATURATION: 94 % | HEART RATE: 75 BPM | HEIGHT: 70 IN | WEIGHT: 280 LBS | DIASTOLIC BLOOD PRESSURE: 68 MMHG | SYSTOLIC BLOOD PRESSURE: 122 MMHG | BODY MASS INDEX: 40.09 KG/M2

## 2019-12-06 DIAGNOSIS — R19.7 DIARRHEA, UNSPECIFIED TYPE: ICD-10-CM

## 2019-12-06 DIAGNOSIS — E11.65 UNCONTROLLED TYPE 2 DIABETES MELLITUS WITH HYPERGLYCEMIA (HCC): Primary | ICD-10-CM

## 2019-12-06 DIAGNOSIS — N18.30 CHRONIC KIDNEY DISEASE, STAGE III (MODERATE) (HCC): ICD-10-CM

## 2019-12-06 DIAGNOSIS — I10 BENIGN ESSENTIAL HYPERTENSION: ICD-10-CM

## 2019-12-06 DIAGNOSIS — E34.9 TESTOSTERONE DEFICIENCY: ICD-10-CM

## 2019-12-06 DIAGNOSIS — E55.9 VITAMIN D DEFICIENCY: ICD-10-CM

## 2019-12-06 DIAGNOSIS — Z85.038 HISTORY OF COLON CANCER: ICD-10-CM

## 2019-12-06 DIAGNOSIS — E78.2 MIXED HYPERLIPIDEMIA: ICD-10-CM

## 2019-12-06 PROCEDURE — 99214 OFFICE O/P EST MOD 30 MIN: CPT | Performed by: NURSE PRACTITIONER

## 2019-12-06 RX ORDER — LINAGLIPTIN 5 MG/1
5 TABLET, FILM COATED ORAL DAILY
Qty: 90 TABLET | Refills: 1 | Status: SHIPPED | OUTPATIENT
Start: 2019-12-06 | End: 2020-05-17

## 2019-12-06 RX ORDER — FENOFIBRATE 48 MG/1
48 TABLET, COATED ORAL DAILY
Qty: 90 TABLET | Refills: 2 | Status: SHIPPED | OUTPATIENT
Start: 2019-12-06 | End: 2020-09-23

## 2019-12-06 NOTE — PATIENT INSTRUCTIONS
Continue toujeo  Stop victoza  Start tradjenta 5 mg once daily  Continue glimeperide  Decrease fenofibrate to 45 mg daily   Gastro referral dr dumont  Gastric empty study

## 2019-12-06 NOTE — PROGRESS NOTES
"Subjective   James Mckeon is a 74 y.o. male is here today for follow-up.  Chief Complaint   Patient presents with   • Diabetes     recent labs  check blood sugar 1 time daily  patient did bring meter   • Hyperlipidemia   • Hypertension   • Vitamin D Deficiency   /68   Pulse 75   Ht 177.8 cm (70\")   Wt 127 kg (280 lb)   SpO2 94%   BMI 40.18 kg/m²   Current Outpatient Medications on File Prior to Visit   Medication Sig   • aspirin 81 MG EC tablet Take 1 tablet by mouth Daily.   • fenofibrate (TRICOR) 145 MG tablet TAKE 1 TABLET BY MOUTH DAILY   • furosemide (LASIX) 40 MG tablet Take 1.5 tablets by mouth Daily.   • glimepiride (AMARYL) 4 MG tablet Take 1 tablet by mouth 2 (Two) Times a Day With Meals.   • glucose blood test strip Test 4 times daily   • Insulin Pen Needle 31G X 5 MM misc Use to inject insulin 2 x daily   • Insulin Pen Needle 31G X 8 MM misc Use to inject 2 times daily   • losartan (COZAAR) 25 MG tablet Take 25 mg by mouth daily.   • LUMIGAN 0.01 % ophthalmic drops INSTILL 1 DROP IN OU QHS   • metoprolol succinate XL (TOPROL-XL) 50 MG 24 hr tablet TAKE 1 AND 1/2 TABLETS BY MOUTH TWICE DAILY   • potassium chloride (K-DUR) 10 MEQ CR tablet Take 10 mEq by mouth 2 (Two) Times a Day.   • rosuvastatin (CRESTOR) 20 MG tablet Take 20 mg by mouth daily.   • Syringe/Needle, Disp, (B-D 3CC LUER-CHAPIS SYR 23GX1\") 23G X 1\" 3 ML misc USE AS DIRECTED WITH TESTOSTERONE INJECTION   • tamsulosin (FLOMAX) 0.4 MG capsule 24 hr capsule Take 1 capsule by mouth Daily.   • Testosterone Cypionate (DEPOTESTOTERONE CYPIONATE) 200 MG/ML injection 1 cc im every 14 days   • TOUJEO SOLOSTAR 300 UNIT/ML solution pen-injector Inject 75 Units under the skin into the appropriate area as directed Daily.   • VICTOZA 18 MG/3ML solution pen-injector injection Inject 1.8 mg under the skin into the appropriate area as directed Daily.   • Cholecalciferol (VITAMIN D PO) Take 2,000 Units by mouth Daily.   • escitalopram (LEXAPRO) 5 MG " tablet TAKE 1 TABLET BY MOUTH DAILY   • fenofibrate (TRICOR) 145 MG tablet Take 1 tablet by mouth Daily.   • pramipexole (MIRAPEX) 0.25 MG tablet TAKE 1 TABLET BY MOUTH DAILY     No current facility-administered medications on file prior to visit.      Family History   Adopted: Yes   Problem Relation Age of Onset   • Malig Hyperthermia Neg Hx      Social History     Tobacco Use   • Smoking status: Former Smoker   • Smokeless tobacco: Never Used   • Tobacco comment: 50 yrs ago   Substance Use Topics   • Alcohol use: No   • Drug use: No     Allergies   Allergen Reactions   • Toujeo Max Solostar [Insulin Glargine] Cough and Unknown (See Comments)     Sinus drainage   • Victoza [Liraglutide] Other (See Comments) and Cough     Sinus drainage         History of Present Illness   Encounter Diagnoses   Name Primary?   • History of colon cancer Yes   • Mixed hyperlipidemia    • Obstructive sleep apnea syndrome    • Diarrhea, unspecified type    • Vitamin D deficiency    • Uncontrolled type 2 diabetes mellitus with hyperglycemia (CMS/HCC)    • Chronic kidney disease, stage III (moderate) (CMS/HCC)    • Testosterone deficiency    • Benign prostatic hyperplasia without lower urinary tract symptoms      This is a 74-year-old gentleman here today for an acute visit for uncontrolled diarrhea.  He has an appointment coming up in December that was moved forward to today due to his phone call to our office.  He states he has been having problems with diarrhea for approximately over one year.  According to his last visit note there was no mention of diarrhea.  He attributes the diarrhea to possibly Victoza and/or Toujeo.  He has been to see Dr. Blanc and was told that his history of colon cancer and colon resection would not be causing his diarrhea.  He has been to Dr. Mary penn who was told him to get probiotics.  His last colonoscopy was 1 year ago.  He will be scheduled for a gastric emptying study and will be referred to a  gastroenterologist for further evaluation.  He is checking his blood sugars once daily.  His blood sugars vary his hemoglobin A1c has improved to 7.6 however it is not at goal.  He denies any hypoglycemic events.  We will stop his Victoza put him on Tradjenta.  He does have renal insufficiency.  Based on his kidney function I am going to decrease his fenofibrate to 48 mg daily.  He is a coming by his wife to today's visit.  He has been advised to contact me via my chart to discuss his blood sugars in case we need to make further adjustments based on his blood sugar readings.      The following portions of the patient's history were reviewed and updated as appropriate: allergies, current medications, past family history, past medical history, past social history, past surgical history and problem list.    Review of Systems   Constitutional: Positive for fatigue. Negative for appetite change and fever.   Eyes: Negative for visual disturbance.   Respiratory: Negative for shortness of breath.    Cardiovascular: Negative for palpitations and leg swelling.   Gastrointestinal: Positive for abdominal pain and diarrhea. Negative for vomiting.   Endocrine: Positive for polydipsia. Negative for polyuria.   Musculoskeletal: Negative for joint swelling and neck pain.   Skin: Negative for rash.   Neurological: Negative for weakness and numbness.   Psychiatric/Behavioral: Negative for behavioral problems.       Objective   Physical Exam      Assessment/Plan   Problems Addressed this Visit        Cardiovascular and Mediastinum    Benign essential hypertension    Hyperlipidemia    Relevant Medications    fenofibrate (TRICOR) 48 MG tablet    Other Relevant Orders    Ambulatory Referral to Gastroenterology    NM gastric emptying    PSA DIAGNOSTIC    Testosterone (Free & Total), LC / MS    C-Peptide    Vitamin D 25 Hydroxy    Comprehensive Metabolic Panel       Digestive    Vitamin D deficiency    Relevant Orders    Ambulatory Referral  to Gastroenterology    NM gastric emptying    PSA DIAGNOSTIC    Testosterone (Free & Total), LC / MS    C-Peptide    Vitamin D 25 Hydroxy    Comprehensive Metabolic Panel    Diarrhea    Relevant Orders    Ambulatory Referral to Gastroenterology    NM gastric emptying    PSA DIAGNOSTIC    Testosterone (Free & Total), LC / MS    C-Peptide    Vitamin D 25 Hydroxy    Comprehensive Metabolic Panel       Endocrine    Testosterone deficiency    Relevant Orders    Ambulatory Referral to Gastroenterology    NM gastric emptying    PSA DIAGNOSTIC    Testosterone (Free & Total), LC / MS    C-Peptide    Vitamin D 25 Hydroxy    Comprehensive Metabolic Panel    Uncontrolled type 2 diabetes mellitus (CMS/HCC) - Primary    Relevant Medications    TRADJENTA 5 MG tablet tablet    Other Relevant Orders    Ambulatory Referral to Gastroenterology    NM gastric emptying    PSA DIAGNOSTIC    Testosterone (Free & Total), LC / MS    C-Peptide    Vitamin D 25 Hydroxy    Comprehensive Metabolic Panel       Genitourinary    Chronic kidney disease, stage III (moderate) (CMS/HCC)    Relevant Orders    Ambulatory Referral to Gastroenterology    NM gastric emptying    PSA DIAGNOSTIC    Testosterone (Free & Total), LC / MS    C-Peptide    Vitamin D 25 Hydroxy    Comprehensive Metabolic Panel       Other    History of colon cancer    Relevant Orders    Ambulatory Referral to Gastroenterology    NM gastric emptying    PSA DIAGNOSTIC    Testosterone (Free & Total), LC / MS    C-Peptide    Vitamin D 25 Hydroxy    Comprehensive Metabolic Panel        Patient was seen and examined.  He will continue on Toujeo.  We will stop Victoza to see if this is anyway related to his complaints of diarrhea.  He will start Tradjenta 5 mg once daily.  We had no samples available in the office today so a prescription has been sent.  He will continue his glimepiride.  He will be referred to Dr. Cavazos for evaluation of chronic diarrhea.  He will be referred for gastric  emptying study.  He is to monitor his blood sugars at least 1-2 times daily and contact me via my chart so that we can make further adjustments to his medications to achieve his A1c goal of less than 7.  He is going to [place] for the next 6 months and will follow-up after.  He was educated today regarding mechanism of action of his current medications.  His cholesterol is in satisfactory range.  He is on testosterone therapy and will have additional labs today to evaluate his testosterone level as well as his vitamin D.  A dennis will be requested and reviewed.  Continue toujeo  Stop victoza  Start tradjenta 5 mg once daily  Continue glimeperide  Decrease fenofibrate to 45 mg daily   Gastro referral dr dumont  Gastric empty study

## 2019-12-06 NOTE — TELEPHONE ENCOUNTER
pt is having diarrhea with medication and states he is not going to take it anymore and wants you to change the victoza and toujeo. he states he has told you in the past at his visits and you have done nothing but blow it off and told him he needed to deal with it. he says that within 15 mins. of eating he is on toliet. please call patient and let him know what you will change the medicine to. [phone]

## 2019-12-09 LAB
25(OH)D3+25(OH)D2 SERPL-MCNC: 39.3 NG/ML (ref 30–100)
ALBUMIN SERPL-MCNC: 4.4 G/DL (ref 3.5–5.2)
ALBUMIN/GLOB SERPL: 1.5 G/DL
ALP SERPL-CCNC: 52 U/L (ref 39–117)
ALT SERPL-CCNC: 35 U/L (ref 1–41)
AST SERPL-CCNC: 19 U/L (ref 1–40)
BILIRUB SERPL-MCNC: 0.5 MG/DL (ref 0.2–1.2)
BUN SERPL-MCNC: 39 MG/DL (ref 8–23)
BUN/CREAT SERPL: 23.9 (ref 7–25)
C PEPTIDE SERPL-MCNC: 16.3 NG/ML (ref 1.1–4.4)
CALCIUM SERPL-MCNC: 9.9 MG/DL (ref 8.6–10.5)
CHLORIDE SERPL-SCNC: 101 MMOL/L (ref 98–107)
CO2 SERPL-SCNC: 28.7 MMOL/L (ref 22–29)
CREAT SERPL-MCNC: 1.63 MG/DL (ref 0.76–1.27)
GLOBULIN SER CALC-MCNC: 2.9 GM/DL
GLUCOSE SERPL-MCNC: 153 MG/DL (ref 65–99)
INTERPRETATION: NORMAL
Lab: NORMAL
POTASSIUM SERPL-SCNC: 4.9 MMOL/L (ref 3.5–5.2)
PROT SERPL-MCNC: 7.3 G/DL (ref 6–8.5)
PSA SERPL-MCNC: 1.7 NG/ML (ref 0–4)
SODIUM SERPL-SCNC: 142 MMOL/L (ref 136–145)
TESTOST FREE SERPL-MCNC: 6.1 PG/ML (ref 6.6–18.1)
TESTOST SERPL-MCNC: 98.1 NG/DL (ref 264–916)

## 2019-12-16 RX ORDER — TESTOSTERONE CYPIONATE 200 MG/ML
INJECTION, SOLUTION INTRAMUSCULAR
Qty: 10 ML | Refills: 0 | Status: SHIPPED | OUTPATIENT
Start: 2019-12-16 | End: 2020-06-08 | Stop reason: SDUPTHER

## 2019-12-30 RX ORDER — INSULIN GLARGINE 300 U/ML
75 INJECTION, SOLUTION SUBCUTANEOUS DAILY
Qty: 27 ML | Refills: 1 | Status: SHIPPED | OUTPATIENT
Start: 2019-12-30 | End: 2020-06-08

## 2020-02-24 RX ORDER — ESCITALOPRAM OXALATE 5 MG/1
5 TABLET ORAL DAILY
Qty: 90 TABLET | Refills: 0 | Status: SHIPPED | OUTPATIENT
Start: 2020-02-24 | End: 2020-05-18

## 2020-03-06 ENCOUNTER — TELEPHONE (OUTPATIENT)
Dept: SLEEP MEDICINE | Facility: HOSPITAL | Age: 75
End: 2020-03-06

## 2020-03-06 NOTE — TELEPHONE ENCOUNTER
Pt called stating he is in [place] and his machine broke.  He has asked that a new order be sent to [facility] at his location along with all necessary documentation.  Kristen signed order and old studies and last office visit with demo sheet were faxed to the provided number.

## 2020-03-08 DIAGNOSIS — N18.30 CHRONIC KIDNEY DISEASE, STAGE III (MODERATE) (HCC): ICD-10-CM

## 2020-03-08 DIAGNOSIS — I10 BENIGN ESSENTIAL HYPERTENSION: ICD-10-CM

## 2020-03-08 RX ORDER — GLIMEPIRIDE 4 MG/1
TABLET ORAL
Qty: 180 TABLET | Refills: 1 | Status: SHIPPED | OUTPATIENT
Start: 2020-03-08 | End: 2020-07-29

## 2020-03-09 RX ORDER — PRAMIPEXOLE DIHYDROCHLORIDE 0.25 MG/1
0.25 TABLET ORAL DAILY
Qty: 90 TABLET | Refills: 0 | Status: SHIPPED | OUTPATIENT
Start: 2020-03-09 | End: 2021-05-14 | Stop reason: SDUPTHER

## 2020-03-10 ENCOUNTER — TELEPHONE (OUTPATIENT)
Dept: SLEEP MEDICINE | Facility: HOSPITAL | Age: 75
End: 2020-03-10

## 2020-03-10 NOTE — TELEPHONE ENCOUNTER
Pt called very agitated because he has been sleeping without his machine and wants a new machine. Pts insurance needs an order stating that machine is broken and needs replaced not repaired. Refaxed order. If they dont accept it, we will retrieve another order

## 2020-03-11 ENCOUNTER — TELEPHONE (OUTPATIENT)
Dept: SLEEP MEDICINE | Facility: HOSPITAL | Age: 75
End: 2020-03-11

## 2020-05-13 ENCOUNTER — DOCUMENTATION (OUTPATIENT)
Dept: SLEEP MEDICINE | Facility: HOSPITAL | Age: 75
End: 2020-05-13

## 2020-05-13 NOTE — PROGRESS NOTES
James Mckeon  2020 10:17 AM  Location: [facility]  Patient #: [id number]  : 1945   / Language: English / Race: White  Male      History of Present Illness (Kristen SANZ; 2020 12:08 PM)    The patient is a 75 year old male who presents with a sleep disorder. James was last seen at [facility] in May 2019. This is a televisit done through [facility]. I was able to see the patient via video and hear him just fine. However, he was unable to see me due to techical difficulties. He is doing well on CPAP machine in general. His ESS is 3. He was recently in [place]. His old machine broke. He went to [facility] in [place] and got a new machine. He feels that the initial CPAP pressures are too low. Otherwise, he denies snoring or gasping respirations while on the device. He uses Pramipexole for RLS and it seems to control the symptoms. His sleep schedule is 10pm-8am.      Problem List/Past Medical ([facility] Lr; 2020 12:12 PM)  Coronary artery disease (I25.10)    Diverticulosis (K57.90)    Hypertension (I10)    Testosterone deficiency (E34.9)    Type 2 diabetes mellitus (E11.9)    Moderate to severe pulmonary hypertension (I27.20)    History of colon cancer (Z85.038)    Vitamin D deficiency (E55.9)    Anxiety (F41.9)    CKD (chronic kidney disease), stage III (N18.3)    Hyperlipidemia (E78.5)    Morbidly obese (E66.01)      Past Surgical History (April Jacobs; 2020 10:17 AM)    Appendectomy    Cholecystectomy    Cardiac Catheterization    Coronary Artery Bypass Graft    Hemicolectomy; Right    Hernia Repair    Knee Surgery   Right.  Nephrectomy   Right.  Laparoscopic Adrenalectomy    ORIF Ulna/Radius   Left.  Umbilical Hernia Repair      Allergies (April Jacobs; 2020 10:31 AM)  Touaric Humphries SoloStar *ANTIDIABETICS*   Cough. Sinus Drainage  Victoza *ANTIDIABETICS*   Diarrhea. Sinus Drainage  Allergies Reconciled      Medication History (April Jacobs; 2020  10:34 AM)  Aspirin  (81MG Tablet, 1 Oral daily) Active.  Vitamin D  (50 MCG(2000 UT) Tablet, 1 Oral daily) Active.  Escitalopram Oxalate  (5MG Tablet, 1 Oral daily) Active.  Fenofibrate  (48MG Tablet, 1 Oral daily) Active.  Glimepiride  (4MG Tablet, 1 Oral two times daily) Active.  Losartan Potassium  (25MG Tablet, 1 Oral daily) Active.  Metoprolol Succinate ER  (50MG Tablet ER 24HR, 1 and 1/2 Oral two times daily) Active.  Pramipexole Dihydrochloride  (0.25MG Tablet, 1 Oral daily) Active.  Rosuvastatin Calcium  (20MG Tablet, 1 Oral daily) Active.  Tamsulosin HCl  (0.4MG Capsule, 1 Oral daily) Active.  Testosterone Cypionate  (200MG/ML Solution, 1mL Intramuscular every two weeks) Active.  Toujeo SoloStar  (300UNIT/ML Soln Pen-inj, 75 units Subcutaneous daily) Active.  Tradjenta  (5MG Tablet, 1 Oral daily) Active.  Furosemide  (40MG Tablet, 1 Oral daily) Active.  Lumigan  (0.01% Solution, 1 drop in each eye Ophthalmic daily) Active.  Potassium Chloride ER  (10MEQ Tablet ER, 1 Oral daily) Active.  Medications Reconciled     Social History (April Jacobs; 5/13/2020 10:17 AM)  Tobacco use   Former smoker. Quit in 1970  No alcohol use    No drug use    Marital status   .  Current work status   Retired.    Family History (April Jacobs; 5/13/2020 10:17 AM)  Family history unknown - Adopted      Diagnostic Studies History (April Jacobs; 5/13/2020 10:17 AM)  Colonoscopy    Endoscopy      Health Maintenance History (April Jacobs; 5/13/2020 10:34 AM)  DME Company  [05/12/2020]: [employer]  Flu Vaccine  [05/12/2020]: Performed. 10/2019  Pneumovax  [05/12/2020]: Performed. 11/13/2018  Prevnar 13  [05/12/2020]: Performed. 11/07/2017    Travel History (April Jacobs; 5/13/2020 10:34 AM)  None  [05/13/2020]:        Review of Systems (April Jacobs; 5/13/2020 10:37 AM)    General Not Present- Chills, Fever, Night Sweats, Poor Appetite and Weight Loss.  Skin Not Present- Nail Changes and Rash.  HEENT Not  Present- Eye Pain, Hoarseness, Nasal Congestion, Nose Bleed, Persistent Hoarseness, Post Nasal Drip, Post Nasal Drip Syndrome, Recurrent Nose Bleeds and Sores in Mouth.  Respiratory Present- Trouble Falling Asleep. Not Present- Awakens Exhausted, Bloody sputum, Chest Discomfort/Tightness, Cough, Excessive Daytime Sleepiness, Shortness of Breath, Snoring, Wakes up from Sleep Wheezing or Short of Breath and Wheezing.  Cardiovascular Not Present- Chest Pain, Difficulty Breathing Lying Down, Palpitations and Swollen Ankles or Legs.  Gastrointestinal Not Present- Belching, Difficulty Swallowing, Frequent Heartburn and Indigestion.  Male Genitourinary Not Present- Difficulty Emptying Bladder and Frequent Urination.  Musculoskeletal Not Present- Joint Swelling and Muscle Weakness.  Neurological Not Present- Difficulty Speaking and Seizures.  Psychiatric Not Present- Anxiety and Depression.  Endocrine Not Present- Cold Intolerance and Excessive Sweating.  Hematology Not Present- Enlarged Lymph Nodes and Excessive bleeding.    Vitals (April Jacobs; 5/13/2020 10:35 AM)  5/13/2020 10:34 AM  Weight: 278 lb   Height: 69 in  Neck: 17.5 in     Weight was reported by patient.  Height was reported by patient.  Body Surface Area: 2.38 m²   Body Mass Index: 41.05 kg/m²        Physical Exam (Kristen SANZ; 5/13/2020 12:08 PM)  General  General Appearance - Cooperative.  Build & Nutrition - Obese.  Oxygen Therapy - Breathing Room Air.  Mental Status - Alert.    Integumentary  General Characteristics  Color - normal coloration of skin.        Assessment & Plan (Kristen SANZ; 5/13/2020 12:19 PM)    BRUCE on CPAP (G47.33)  Impression: Our En[employer] system is down and we are unable to download the smart card at the moment. We will try getting an updated d/l as soon as system starts working again. After review of d/l, I will change the starting CPAP pressures by a bit as he feels that initial pressure may be too low. I asked him  to f/u with Dr. Barone in 1 year at [facility] as he is otherwise doing great. If he has any issues, he will give us a call.    I reviewed d/l dates 3/12/20-5/12/20- 100% use with average nightly use of 8 hours and 58 minutes on auto CPAP 12-20cm with AHI 1.4. As he feels that initial pressures are a bit too low, I have changed the pressures to 14-20cm. He will f/u with us at [facility] in 1 year.      He has severe underlying BRUCE with AHI 72. He uses the machine and benefits.    Restless leg syndrome (G25.81)  Impression: Continue Pramipexole 0.25mg qhs. I will send refills to his local pharmacy. He is requesting an annual rx for Pramipexole.        Follow up in 1 year or as needed    Started Pramipexole Dihydrochloride 0.25MG, 1 (one) Tablet 2 hours prior to bedtime, #90, 90 days starting 05/13/2020, Ref. x3, Mail Order #90, 90 days, Ref. x1.  Addendum Note (Kristen SANZ; 5/13/2020 3:11 PM)            Signed by [facility] Lr (5/13/2020 12:19 PM)

## 2020-05-17 RX ORDER — LINAGLIPTIN 5 MG/1
5 TABLET, FILM COATED ORAL DAILY
Qty: 90 TABLET | Refills: 0 | Status: SHIPPED | OUTPATIENT
Start: 2020-05-17 | End: 2020-08-24

## 2020-05-18 ENCOUNTER — APPOINTMENT (OUTPATIENT)
Dept: SLEEP MEDICINE | Facility: HOSPITAL | Age: 75
End: 2020-05-18

## 2020-05-18 RX ORDER — ESCITALOPRAM OXALATE 5 MG/1
5 TABLET ORAL DAILY
Qty: 90 TABLET | Refills: 0 | Status: SHIPPED | OUTPATIENT
Start: 2020-05-18 | End: 2020-08-24

## 2020-05-20 LAB
25(OH)D3+25(OH)D2 SERPL-MCNC: 52.2 NG/ML (ref 30–100)
ALBUMIN SERPL-MCNC: 4 G/DL (ref 3.5–5.2)
ALBUMIN/GLOB SERPL: 1.5 G/DL
ALP SERPL-CCNC: 42 U/L (ref 39–117)
ALT SERPL-CCNC: 31 U/L (ref 1–41)
AST SERPL-CCNC: 11 U/L (ref 1–40)
BILIRUB SERPL-MCNC: 0.4 MG/DL (ref 0.2–1.2)
BUN SERPL-MCNC: 36 MG/DL (ref 8–23)
BUN/CREAT SERPL: 20.2 (ref 7–25)
C PEPTIDE SERPL-MCNC: 10.5 NG/ML (ref 1.1–4.4)
CALCIUM SERPL-MCNC: 9.4 MG/DL (ref 8.6–10.5)
CHLORIDE SERPL-SCNC: 101 MMOL/L (ref 98–107)
CHOLEST SERPL-MCNC: 119 MG/DL (ref 0–200)
CO2 SERPL-SCNC: 27.8 MMOL/L (ref 22–29)
CREAT SERPL-MCNC: 1.78 MG/DL (ref 0.76–1.27)
GLOBULIN SER CALC-MCNC: 2.7 GM/DL
GLUCOSE SERPL-MCNC: 172 MG/DL (ref 65–99)
HBA1C MFR BLD: 8.87 % (ref 4.8–5.6)
HCT VFR BLD AUTO: 46.2 % (ref 37.5–51)
HDLC SERPL-MCNC: 32 MG/DL (ref 40–60)
HGB BLD-MCNC: 14.8 G/DL (ref 13–17.7)
INTERPRETATION: NORMAL
LDLC SERPL CALC-MCNC: 43 MG/DL (ref 0–100)
Lab: NORMAL
MICROALBUMIN UR-MCNC: 53.4 UG/ML
POTASSIUM SERPL-SCNC: 4.7 MMOL/L (ref 3.5–5.2)
PROT SERPL-MCNC: 6.7 G/DL (ref 6–8.5)
PSA SERPL-MCNC: 1.76 NG/ML (ref 0–4)
SHBG SERPL-SCNC: 22.4 NMOL/L (ref 19.3–76.4)
SODIUM SERPL-SCNC: 141 MMOL/L (ref 136–145)
TESTOST FREE SERPL-MCNC: 13.2 PG/ML (ref 6.6–18.1)
TESTOST SERPL-MCNC: 346 NG/DL (ref 264–916)
TRIGL SERPL-MCNC: 221 MG/DL (ref 0–150)
VLDLC SERPL CALC-MCNC: 44.2 MG/DL

## 2020-06-08 ENCOUNTER — OFFICE VISIT (OUTPATIENT)
Dept: ENDOCRINOLOGY | Age: 75
End: 2020-06-08

## 2020-06-08 VITALS
DIASTOLIC BLOOD PRESSURE: 90 MMHG | WEIGHT: 283 LBS | SYSTOLIC BLOOD PRESSURE: 158 MMHG | BODY MASS INDEX: 40.52 KG/M2 | HEIGHT: 70 IN | RESPIRATION RATE: 16 BRPM

## 2020-06-08 DIAGNOSIS — E11.65 TYPE 2 DIABETES MELLITUS WITH HYPERGLYCEMIA (HCC): ICD-10-CM

## 2020-06-08 DIAGNOSIS — E78.5 HYPERLIPIDEMIA, UNSPECIFIED HYPERLIPIDEMIA TYPE: ICD-10-CM

## 2020-06-08 DIAGNOSIS — E34.9 TESTOSTERONE DEFICIENCY: ICD-10-CM

## 2020-06-08 DIAGNOSIS — E11.65 UNCONTROLLED TYPE 2 DIABETES MELLITUS WITH HYPERGLYCEMIA (HCC): Primary | ICD-10-CM

## 2020-06-08 DIAGNOSIS — N18.30 CHRONIC KIDNEY DISEASE, STAGE III (MODERATE) (HCC): ICD-10-CM

## 2020-06-08 DIAGNOSIS — E78.2 MIXED HYPERLIPIDEMIA: ICD-10-CM

## 2020-06-08 DIAGNOSIS — I10 BENIGN ESSENTIAL HYPERTENSION: ICD-10-CM

## 2020-06-08 DIAGNOSIS — E55.9 VITAMIN D DEFICIENCY: ICD-10-CM

## 2020-06-08 PROCEDURE — 99214 OFFICE O/P EST MOD 30 MIN: CPT | Performed by: NURSE PRACTITIONER

## 2020-06-08 RX ORDER — INSULIN GLARGINE 300 U/ML
80 INJECTION, SOLUTION SUBCUTANEOUS DAILY
Qty: 27 ML | Refills: 1 | Status: SHIPPED | OUTPATIENT
Start: 2020-06-08 | End: 2021-03-26 | Stop reason: SDUPTHER

## 2020-06-08 RX ORDER — TESTOSTERONE CYPIONATE 200 MG/ML
INJECTION, SOLUTION INTRAMUSCULAR
Qty: 10 ML | Refills: 0 | Status: SHIPPED | OUTPATIENT
Start: 2020-06-08 | End: 2021-02-19 | Stop reason: SDUPTHER

## 2020-06-08 RX ORDER — FLASH GLUCOSE SENSOR
1 KIT MISCELLANEOUS
Qty: 2 EACH | Refills: 5 | Status: SHIPPED | OUTPATIENT
Start: 2020-06-08 | End: 2020-12-08

## 2020-06-08 RX ORDER — FLASH GLUCOSE SENSOR
1 KIT MISCELLANEOUS ONCE
Qty: 1 DEVICE | Refills: 0 | Status: SHIPPED | OUTPATIENT
Start: 2020-06-08 | End: 2020-06-08

## 2020-06-08 NOTE — TELEPHONE ENCOUNTER
Sent to dr Gina alves review  ----- Message from [facility] Chavez sent at 6/8/2020 12:45 PM EDT -----      ----- Message -----  From: Pamella Fontaine APRN  Sent: 6/8/2020  12:40 PM EDT  To: [facility] Chavez    Please send over refill for testosterone under dr gina collins will be needed

## 2020-06-08 NOTE — PATIENT INSTRUCTIONS
Increase toujeo to 80 units once daily  Monitor blood sugars   Follow up in 6 mo  Keep me informed of blood sugars so we can make changes to insulin if needed.  Monitor blood sugars closely for hypoglycemic events less than 70 mg/dL  Hypoglycemia  Hypoglycemia is when the sugar (glucose) level in your blood is too low. Signs of low blood sugar may include:  · Feeling:  ? Hungry.  ? Worried or nervous (anxious).  ? Sweaty and clammy.  ? Confused.  ? Dizzy.  ? Sleepy.  ? Sick to your stomach (nauseous).  · Having:  ? A fast heartbeat.  ? A headache.  ? A change in your vision.  ? Tingling or no feeling (numbness) around your mouth, lips, or tongue.  ? Jerky movements that you cannot control (seizure).  · Having trouble with:  ? Moving (coordination).  ? Sleeping.  ? Passing out (fainting).  ? Getting upset easily (irritability).  Low blood sugar can happen to people who have diabetes and people who do not have diabetes. Low blood sugar can happen quickly, and it can be an emergency.  Treating low blood sugar  Low blood sugar is often treated by eating or drinking something sugary right away, such as:  · Fruit juice, 4-6 oz (120-150 mL).  · Regular soda (not diet soda), 4-6 oz (120-150 mL).  · Low-fat milk, 4 oz (120 mL).  · Several pieces of hard candy.  · Sugar or honey, 1 Tbsp (15 mL).  Treating low blood sugar if you have diabetes  If you can think clearly and swallow safely, follow the 15:15 rule:  · Take 15 grams of a fast-acting carb (carbohydrate). Talk with your doctor about how much you should take.  · Always keep a source of fast-acting carb with you, such as:  ? Sugar tablets (glucose pills). Take 3-4 pills.  ? 6-8 pieces of hard candy.  ? 4-6 oz (120-150 mL) of fruit juice.  ? 4-6 oz (120-150 mL) of regular (not diet) soda.  ? 1 Tbsp (15 mL) honey or sugar.  · Check your blood sugar 15 minutes after you take the carb.  · If your blood sugar is still at or below 70 mg/dL (3.9 mmol/L), take 15 grams of a  carb again.  · If your blood sugar does not go above 70 mg/dL (3.9 mmol/L) after 3 tries, get help right away.  · After your blood sugar goes back to normal, eat a meal or a snack within 1 hour.    Treating very low blood sugar  If your blood sugar is at or below 54 mg/dL (3 mmol/L), you have very low blood sugar (severe hypoglycemia). This may also cause:  · Passing out.  · Jerky movements you cannot control (seizure).  · Losing consciousness (coma).  This is an emergency. Do not wait to see if the symptoms will go away. Get medical help right away. Call your local emergency services (911 in the [place] Do not drive yourself to the hospital.  If you have very low blood sugar and you cannot eat or drink, you may need a glucagon shot (injection). A family member or friend should learn how to check your blood sugar and how to give you a glucagon shot. Ask your doctor if you need to have a glucagon shot kit at home.  Follow these instructions at home:  General instructions  · Take over-the-counter and prescription medicines only as told by your doctor.  · Stay aware of your blood sugar as told by your doctor.  · Limit alcohol intake to no more than 1 drink a day for nonpregnant women and 2 drinks a day for men. One drink equals 12 oz of beer (355 mL), 5 oz of wine (148 mL), or 1½ oz of hard liquor (44 mL).  · Keep all follow-up visits as told by your doctor. This is important.  If you have diabetes:    · Follow your diabetes care plan as told by your doctor. Make sure you:  ? Know the signs of low blood sugar.  ? Take your medicines as told.  ? Follow your exercise and meal plan.  ? Eat on time. Do not skip meals.  ? Check your blood sugar as often as told by your doctor. Always check it before and after exercise.  ? Follow your sick day plan when you cannot eat or drink normally. Make this plan ahead of time with your doctor.  · Share your diabetes care plan with:  ? Your work or school.  ? People you live  with.  · Check your pee (urine) for ketones:  ? When you are sick.  ? As told by your doctor.  · Carry a card or wear jewelry that says you have diabetes.  Contact a doctor if:  · You have trouble keeping your blood sugar in your target range.  · You have low blood sugar often.  Get help right away if:  · You still have symptoms after you eat or drink something sugary.  · Your blood sugar is at or below 54 mg/dL (3 mmol/L).  · You have jerky movements that you cannot control.  · You pass out.  These symptoms may be an emergency. Do not wait to see if the symptoms will go away. Get medical help right away. Call your local emergency services (911 in the [place] Do not drive yourself to the hospital.  Summary  · Hypoglycemia happens when the level of sugar (glucose) in your blood is too low.  · Low blood sugar can happen to people who have diabetes and people who do not have diabetes. Low blood sugar can happen quickly, and it can be an emergency.  · Make sure you know the signs of low blood sugar and know how to treat it.  · Always keep a source of sugar (fast-acting carb) with you to treat low blood sugar.  This information is not intended to replace advice given to you by your health care provider. Make sure you discuss any questions you have with your health care provider.  Document Released: 03/14/2011 Document Revised: 04/09/2020 Document Reviewed: 01/20/2017  Elsevier Patient Education © 2020 [employer] Inc[employer]

## 2020-06-08 NOTE — TELEPHONE ENCOUNTER
Last ov appt 6/8/20 with linda she is req you fill it         Last dennis ran 6/8/20  Last filled 12/16/19 for 84 day

## 2020-06-08 NOTE — PROGRESS NOTES
"Subjective   James Mckeon is a 75 y.o. male is here today for follow-up.  Chief Complaint   Patient presents with   • Diabetes     lab review; denies any problems or concerns; checking BG once a day; no BG readings   • Hyperlipidemia     /90   Resp 16   Ht 177.8 cm (70\")   Wt 128 kg (283 lb)   BMI 40.61 kg/m²   Current Outpatient Medications on File Prior to Visit   Medication Sig   • aspirin 81 MG EC tablet Take 1 tablet by mouth Daily.   • Cholecalciferol (VITAMIN D PO) Take 2,000 Units by mouth Daily.   • escitalopram (LEXAPRO) 5 MG tablet TAKE 1 TABLET BY MOUTH DAILY   • fenofibrate (TRICOR) 48 MG tablet Take 1 tablet by mouth Daily.   • furosemide (LASIX) 40 MG tablet Take 1.5 tablets by mouth Daily.   • glimepiride (AMARYL) 4 MG tablet TAKE 1 TABLET BY MOUTH TWICE DAILY WITH MEALS   • glucose blood test strip Test 4 times daily   • Insulin Pen Needle 31G X 8 MM misc Use to inject 2 times daily   • losartan (COZAAR) 25 MG tablet Take 25 mg by mouth daily.   • LUMIGAN 0.01 % ophthalmic drops INSTILL 1 DROP IN OU QHS   • metoprolol succinate XL (TOPROL-XL) 50 MG 24 hr tablet TAKE 1 AND 1/2 TABLETS BY MOUTH TWICE DAILY   • potassium chloride (K-DUR) 10 MEQ CR tablet Take 10 mEq by mouth 2 (Two) Times a Day.   • pramipexole (MIRAPEX) 0.25 MG tablet TAKE 1 TABLET BY MOUTH DAILY   • rosuvastatin (CRESTOR) 20 MG tablet Take 20 mg by mouth daily.   • Syringe/Needle, Disp, (B-D SYRINGE/NEEDLE 3CC/23GX1\") 23G X 1\" 3 ML misc USE A S DIRECTED WITH TESTOSTERONE INJECTION   • tamsulosin (FLOMAX) 0.4 MG capsule 24 hr capsule Take 1 capsule by mouth Daily.   • Testosterone Cypionate (DEPOTESTOTERONE CYPIONATE) 200 MG/ML injection INJECT 1ML INTRAMUSCULAR EVERY 14 DAYS   • TOUJEO SOLOSTAR 300 UNIT/ML solution pen-injector injection INJECT 75 UNITS UNDER THE SKIN INTO THE APPROPRIATE AREA AS DIRECTED DAILY   • TRADJENTA 5 MG tablet tablet TAKE 1 TABLET BY MOUTH DAILY   • [DISCONTINUED] Insulin Pen Needle 31G X 5 MM " misc Use to inject insulin 2 x daily     No current facility-administered medications on file prior to visit.      Family History   Adopted: Yes   Problem Relation Age of Onset   • Malig Hyperthermia Neg Hx      Social History     Tobacco Use   • Smoking status: Former Smoker   • Smokeless tobacco: Never Used   • Tobacco comment: 50 yrs ago   Substance Use Topics   • Alcohol use: No   • Drug use: No         History of Present Illness   Encounter Diagnoses   Name Primary?   • Benign essential hypertension    • Mixed hyperlipidemia    • Testosterone deficiency    • Uncontrolled type 2 diabetes mellitus with hyperglycemia (CMS/[facility]) Yes   • Vitamin D deficiency      75-year-old male patient here today for routine follow-up visit.  He has been seen for the above-mentioned problems.  He has had recent labs which reflect uncontrolled diabetes.  He did not bring his blood glucose meter in today's visit.  We discussed the benefit of a continuous glucose monitoring sensor and he was given a sample of the Freestyle at today's visit.  A prescription has been sent.  He is get ready go back to [place] will be there for several months.  His blood pressure is slightly elevated at today's visit.  His medication list was reviewed and updated.  He is requesting a refill for his testosterone therapy and a request has been sent to be placed under Dr. Ocasio.  A Reji has been requested.  Patient states he is no longer taking Victoza which she did not tolerate.  His allergy list is updated to include this as a medication he is intolerant of taking.  He is up-to-date on his eye exam which is present in his chart.    The following portions of the patient's history were reviewed and updated as appropriate: allergies, current medications, past family history, past medical history, past social history, past surgical history and problem list.    Review of Systems   Constitutional: Negative for fatigue and fever.   Respiratory: Negative for  cough and shortness of breath.    Cardiovascular: Negative for chest pain.   Neurological: Negative for dizziness, light-headedness and headaches.   Psychiatric/Behavioral: Negative for sleep disturbance.       Objective   Physical Exam   Constitutional: He is oriented to person, place, and time. He appears well-developed and well-nourished. No distress.   HENT:   Head: Normocephalic and atraumatic.   Right Ear: External ear normal.   Left Ear: External ear normal.   Nose: Nose normal.   Eyes: Pupils are equal, round, and reactive to light. Right eye exhibits no discharge. Left eye exhibits no discharge.   Neck: Normal range of motion. Neck supple. Carotid bruit is not present. No tracheal deviation, no edema and no erythema present. No thyromegaly present.   Cardiovascular: Normal rate, regular rhythm, normal heart sounds and intact distal pulses. Exam reveals no gallop and no friction rub.   No murmur heard.  Pulmonary/Chest: Effort normal and breath sounds normal. No respiratory distress. He has no wheezes. He has no rales.   Abdominal: He exhibits no distension. There is no tenderness.   Musculoskeletal: Normal range of motion. He exhibits edema. He exhibits no deformity.   Trace edema in left lower ext   Lymphadenopathy:     He has no cervical adenopathy.   Neurological: He is alert and oriented to person, place, and time. Coordination normal.   Skin: Skin is warm and dry. No rash noted. He is not diaphoretic. No erythema. No pallor.   Psychiatric: He has a normal mood and affect. His behavior is normal. Judgment and thought content normal.   Nursing note and vitals reviewed.    Results for orders placed or performed in visit on 12/06/19   PSA DIAGNOSTIC   Result Value Ref Range    PSA 1.700 0.000 - 4.000 ng/mL   Testosterone (Free & Total), LC / MS   Result Value Ref Range    Testosterone, Total 98.1 (L) 264.0 - 916.0 ng/dL    Testosterone, Free 6.1 (L) 6.6 - 18.1 pg/mL   C-Peptide   Result Value Ref Range     C-Peptide 16.3 (H) 1.1 - 4.4 ng/mL   Vitamin D 25 Hydroxy   Result Value Ref Range    25 Hydroxy, Vitamin D 39.3 30.0 - 100.0 ng/ml   Comprehensive Metabolic Panel   Result Value Ref Range    Glucose 153 (H) 65 - 99 mg/dL    BUN 39 (H) 8 - 23 mg/dL    Creatinine 1.63 (H) 0.76 - 1.27 mg/dL    eGFR Non African Am 42 (L) >60 mL/min/1.73    eGFR African Am 51 (L) >60 mL/min/1.73    BUN/Creatinine Ratio 23.9 7.0 - 25.0    Sodium 142 136 - 145 mmol/L    Potassium 4.9 3.5 - 5.2 mmol/L    Chloride 101 98 - 107 mmol/L    Total CO2 28.7 22.0 - 29.0 mmol/L    Calcium 9.9 8.6 - 10.5 mg/dL    Total Protein 7.3 6.0 - 8.5 g/dL    Albumin 4.40 3.50 - 5.20 g/dL    Globulin 2.9 gm/dL    A/G Ratio 1.5 g/dL    Total Bilirubin 0.5 0.2 - 1.2 mg/dL    Alkaline Phosphatase 52 39 - 117 U/L    AST (SGOT) 19 1 - 40 U/L    ALT (SGPT) 35 1 - 41 U/L   [facility] CKD Program   Result Value Ref Range    Interpretation Note    Diabetes Patient Education   Result Value Ref Range    PDF Image Not applicable          Assessment/Plan   Problems Addressed this Visit        Cardiovascular and Mediastinum    Benign essential hypertension    Hyperlipidemia       Digestive    Vitamin D deficiency       Endocrine    Testosterone deficiency    Uncontrolled type 2 diabetes mellitus (CMS/HCC) - Primary          Patient was seen and examined.  Metabolically and clinically he presents stable.  His blood pressure is elevated at today's visit.  His medication list was reviewed.  He was given a FreeStyle virgilio at today's visit with instructions on how to monitor his blood sugars.  He is to keep me informed of his blood glucose readings.  His A1c is 8.87 which is not at goal.  He has been advised to increase his Toujeo insulin up to 80 units once daily.  We will continue to increase in increments based on his morning blood sugars.  He reports his morning blood sugars are typically in the 1  range.  We discussed goal being less than 110 mg/dL.  He is active on my  chart and he has been advised that he can email me his blood sugars.

## 2020-06-17 ENCOUNTER — TELEPHONE (OUTPATIENT)
Dept: ENDOCRINOLOGY | Age: 75
End: 2020-06-17

## 2020-06-19 ENCOUNTER — TELEPHONE (OUTPATIENT)
Dept: ENDOCRINOLOGY | Age: 75
End: 2020-06-19

## 2020-06-19 NOTE — TELEPHONE ENCOUNTER
Pt called to check the status of the PA that needs to be done for pt's virgilio monitior. Medicare will not pay for it with out PA and pt is almost out.

## 2020-06-19 NOTE — TELEPHONE ENCOUNTER
Spoke to patient. PA not needed. RX needs to go to a mail order DME. Informed patient it will be sent to [facility]. Patient expressed understanding.

## 2020-07-09 ENCOUNTER — TELEPHONE (OUTPATIENT)
Dept: ENDOCRINOLOGY | Age: 75
End: 2020-07-09

## 2020-07-09 DIAGNOSIS — E11.65 TYPE 2 DIABETES MELLITUS WITH HYPERGLYCEMIA (HCC): ICD-10-CM

## 2020-07-09 DIAGNOSIS — N18.30 CHRONIC KIDNEY DISEASE, STAGE III (MODERATE) (HCC): ICD-10-CM

## 2020-07-09 DIAGNOSIS — E34.9 TESTOSTERONE DEFICIENCY: ICD-10-CM

## 2020-07-09 DIAGNOSIS — I10 BENIGN ESSENTIAL HYPERTENSION: ICD-10-CM

## 2020-07-09 DIAGNOSIS — E78.5 HYPERLIPIDEMIA, UNSPECIFIED HYPERLIPIDEMIA TYPE: ICD-10-CM

## 2020-07-09 NOTE — TELEPHONE ENCOUNTER
Patient left voice mail and wants linda to call him     Regarding his virgilio   She needs to tell them she is testing  4 x daily so it will be paid for

## 2020-07-09 NOTE — TELEPHONE ENCOUNTER
Pt called stated he needs to talk to the m/a he said its been over a month since he has heard about the pa on his diane   He would like a call back.           [phone]

## 2020-07-29 RX ORDER — GLIMEPIRIDE 4 MG/1
TABLET ORAL
Qty: 180 TABLET | Refills: 0 | Status: SHIPPED | OUTPATIENT
Start: 2020-07-29 | End: 2020-12-14

## 2020-08-24 RX ORDER — ESCITALOPRAM OXALATE 5 MG/1
5 TABLET ORAL DAILY
Qty: 90 TABLET | Refills: 0 | Status: SHIPPED | OUTPATIENT
Start: 2020-08-24 | End: 2020-11-16 | Stop reason: SDUPTHER

## 2020-08-24 RX ORDER — LINAGLIPTIN 5 MG/1
5 TABLET, FILM COATED ORAL DAILY
Qty: 90 TABLET | Refills: 0 | Status: SHIPPED | OUTPATIENT
Start: 2020-08-24 | End: 2020-11-16

## 2020-09-23 RX ORDER — FENOFIBRATE 48 MG/1
48 TABLET, COATED ORAL DAILY
Qty: 90 TABLET | Refills: 0 | Status: SHIPPED | OUTPATIENT
Start: 2020-09-23 | End: 2020-12-14

## 2020-11-16 RX ORDER — ESCITALOPRAM OXALATE 5 MG/1
5 TABLET ORAL DAILY
Qty: 90 TABLET | Refills: 1 | Status: SHIPPED | OUTPATIENT
Start: 2020-11-16 | End: 2021-05-17

## 2020-11-16 RX ORDER — LINAGLIPTIN 5 MG/1
5 TABLET, FILM COATED ORAL DAILY
Qty: 90 TABLET | Refills: 0 | Status: SHIPPED | OUTPATIENT
Start: 2020-11-16 | End: 2020-11-30

## 2020-11-30 RX ORDER — LINAGLIPTIN 5 MG/1
5 TABLET, FILM COATED ORAL DAILY
Qty: 90 TABLET | Refills: 0 | Status: SHIPPED | OUTPATIENT
Start: 2020-11-30 | End: 2021-06-01 | Stop reason: SDUPTHER

## 2020-12-01 ENCOUNTER — LAB (OUTPATIENT)
Dept: ENDOCRINOLOGY | Age: 75
End: 2020-12-01

## 2020-12-01 DIAGNOSIS — E78.2 MIXED HYPERLIPIDEMIA: ICD-10-CM

## 2020-12-01 DIAGNOSIS — E11.65 UNCONTROLLED TYPE 2 DIABETES MELLITUS WITH HYPERGLYCEMIA (HCC): ICD-10-CM

## 2020-12-01 DIAGNOSIS — R19.7 DIARRHEA, UNSPECIFIED TYPE: ICD-10-CM

## 2020-12-01 DIAGNOSIS — N18.30 CHRONIC KIDNEY DISEASE, STAGE III (MODERATE) (HCC): ICD-10-CM

## 2020-12-01 DIAGNOSIS — E34.9 TESTOSTERONE DEFICIENCY: ICD-10-CM

## 2020-12-01 DIAGNOSIS — Z85.038 HISTORY OF COLON CANCER: ICD-10-CM

## 2020-12-01 DIAGNOSIS — E55.9 VITAMIN D DEFICIENCY: ICD-10-CM

## 2020-12-01 DIAGNOSIS — I10 BENIGN ESSENTIAL HYPERTENSION: Primary | ICD-10-CM

## 2020-12-01 DIAGNOSIS — I10 BENIGN ESSENTIAL HYPERTENSION: ICD-10-CM

## 2020-12-03 DIAGNOSIS — E55.9 VITAMIN D DEFICIENCY: ICD-10-CM

## 2020-12-03 DIAGNOSIS — I10 BENIGN ESSENTIAL HYPERTENSION: ICD-10-CM

## 2020-12-03 DIAGNOSIS — E78.2 MIXED HYPERLIPIDEMIA: ICD-10-CM

## 2020-12-03 DIAGNOSIS — Z12.5 SCREENING PSA (PROSTATE SPECIFIC ANTIGEN): ICD-10-CM

## 2020-12-03 DIAGNOSIS — E11.65 UNCONTROLLED TYPE 2 DIABETES MELLITUS WITH HYPERGLYCEMIA (HCC): Primary | ICD-10-CM

## 2020-12-03 LAB
25(OH)D3+25(OH)D2 SERPL-MCNC: 37.1 NG/ML (ref 30–100)
ALBUMIN SERPL-MCNC: 3.8 G/DL (ref 3.5–5.2)
ALBUMIN/GLOB SERPL: 1.5 G/DL
ALP SERPL-CCNC: 47 U/L (ref 39–117)
ALT SERPL-CCNC: 31 U/L (ref 1–41)
AST SERPL-CCNC: 16 U/L (ref 1–40)
BILIRUB SERPL-MCNC: 0.4 MG/DL (ref 0–1.2)
BUN SERPL-MCNC: 33 MG/DL (ref 8–23)
BUN/CREAT SERPL: 17.6 (ref 7–25)
C PEPTIDE SERPL-MCNC: 7.4 NG/ML (ref 1.1–4.4)
CALCIUM SERPL-MCNC: 8.9 MG/DL (ref 8.6–10.5)
CHLORIDE SERPL-SCNC: 102 MMOL/L (ref 98–107)
CHOLEST SERPL-MCNC: 120 MG/DL (ref 0–200)
CO2 SERPL-SCNC: 29.2 MMOL/L (ref 22–29)
CREAT SERPL-MCNC: 1.87 MG/DL (ref 0.76–1.27)
GLOBULIN SER CALC-MCNC: 2.5 GM/DL
GLUCOSE SERPL-MCNC: 116 MG/DL (ref 65–99)
HBA1C MFR BLD: 8.06 % (ref 4.8–5.6)
HCT VFR BLD AUTO: 45.9 % (ref 37.5–51)
HDLC SERPL-MCNC: 34 MG/DL (ref 40–60)
HGB BLD-MCNC: 14.1 G/DL (ref 13–17.7)
INTERPRETATION: NORMAL
LDLC SERPL CALC-MCNC: 53 MG/DL (ref 0–100)
Lab: NORMAL
POTASSIUM SERPL-SCNC: 4.8 MMOL/L (ref 3.5–5.2)
PROT SERPL-MCNC: 6.3 G/DL (ref 6–8.5)
SODIUM SERPL-SCNC: 141 MMOL/L (ref 136–145)
TESTOST FREE SERPL-MCNC: 20.5 PG/ML (ref 6.6–18.1)
TESTOST SERPL-MCNC: 551 NG/DL (ref 264–916)
TRIGL SERPL-MCNC: 204 MG/DL (ref 0–150)
VLDLC SERPL CALC-MCNC: 33 MG/DL (ref 5–40)

## 2020-12-04 LAB
25(OH)D3+25(OH)D2 SERPL-MCNC: 41.8 NG/ML (ref 30–100)
ALBUMIN SERPL-MCNC: 4 G/DL (ref 3.5–5.2)
ALBUMIN/CREAT UR: 35 MG/G CREAT (ref 0–29)
ALBUMIN/GLOB SERPL: 1.5 G/DL
ALP SERPL-CCNC: 47 U/L (ref 39–117)
ALT SERPL-CCNC: 30 U/L (ref 1–41)
APPEARANCE UR: CLEAR
AST SERPL-CCNC: 14 U/L (ref 1–40)
BACTERIA #/AREA URNS HPF: NORMAL /HPF
BASOPHILS # BLD AUTO: 0.11 10*3/MM3 (ref 0–0.2)
BASOPHILS NFR BLD AUTO: 1.2 % (ref 0–1.5)
BILIRUB SERPL-MCNC: 0.3 MG/DL (ref 0–1.2)
BILIRUB UR QL STRIP: NEGATIVE
BUN SERPL-MCNC: 36 MG/DL (ref 8–23)
BUN/CREAT SERPL: 19.7 (ref 7–25)
CALCIUM SERPL-MCNC: 9.4 MG/DL (ref 8.6–10.5)
CASTS URNS MICRO: NORMAL
CHLORIDE SERPL-SCNC: 102 MMOL/L (ref 98–107)
CHOLEST SERPL-MCNC: 120 MG/DL (ref 0–200)
CO2 SERPL-SCNC: 29.6 MMOL/L (ref 22–29)
COLOR UR: YELLOW
CREAT SERPL-MCNC: 1.83 MG/DL (ref 0.76–1.27)
CREAT UR-MCNC: 117 MG/DL
EOSINOPHIL # BLD AUTO: 0.31 10*3/MM3 (ref 0–0.4)
EOSINOPHIL NFR BLD AUTO: 3.3 % (ref 0.3–6.2)
EPI CELLS #/AREA URNS HPF: NORMAL /HPF
ERYTHROCYTE [DISTWIDTH] IN BLOOD BY AUTOMATED COUNT: 14.5 % (ref 12.3–15.4)
GLOBULIN SER CALC-MCNC: 2.7 GM/DL
GLUCOSE SERPL-MCNC: 106 MG/DL (ref 65–99)
GLUCOSE UR QL: NEGATIVE
HBA1C MFR BLD: 7.8 % (ref 4.8–5.6)
HCT VFR BLD AUTO: 46.7 % (ref 37.5–51)
HDLC SERPL-MCNC: 36 MG/DL (ref 40–60)
HGB BLD-MCNC: 14.7 G/DL (ref 13–17.7)
HGB UR QL STRIP: NEGATIVE
IMM GRANULOCYTES # BLD AUTO: 0.09 10*3/MM3 (ref 0–0.05)
IMM GRANULOCYTES NFR BLD AUTO: 0.9 % (ref 0–0.5)
KETONES UR QL STRIP: NEGATIVE
LDLC SERPL CALC-MCNC: 55 MG/DL (ref 0–100)
LEUKOCYTE ESTERASE UR QL STRIP: NEGATIVE
LYMPHOCYTES # BLD AUTO: 2.03 10*3/MM3 (ref 0.7–3.1)
LYMPHOCYTES NFR BLD AUTO: 21.3 % (ref 19.6–45.3)
MCH RBC QN AUTO: 25.4 PG (ref 26.6–33)
MCHC RBC AUTO-ENTMCNC: 31.5 G/DL (ref 31.5–35.7)
MCV RBC AUTO: 80.7 FL (ref 79–97)
MICROALBUMIN UR-MCNC: 40.7 UG/ML
MONOCYTES # BLD AUTO: 0.79 10*3/MM3 (ref 0.1–0.9)
MONOCYTES NFR BLD AUTO: 8.3 % (ref 5–12)
NEUTROPHILS # BLD AUTO: 6.2 10*3/MM3 (ref 1.7–7)
NEUTROPHILS NFR BLD AUTO: 65 % (ref 42.7–76)
NITRITE UR QL STRIP: NEGATIVE
NRBC BLD AUTO-RTO: 0 /100 WBC (ref 0–0.2)
PH UR STRIP: 5.5 [PH] (ref 5–8)
PLATELET # BLD AUTO: 190 10*3/MM3 (ref 140–450)
POTASSIUM SERPL-SCNC: 4.7 MMOL/L (ref 3.5–5.2)
PROT SERPL-MCNC: 6.7 G/DL (ref 6–8.5)
PROT UR QL STRIP: NEGATIVE
PSA SERPL-MCNC: 1.94 NG/ML (ref 0–4)
RBC # BLD AUTO: 5.79 10*6/MM3 (ref 4.14–5.8)
RBC #/AREA URNS HPF: NORMAL /HPF
SODIUM SERPL-SCNC: 140 MMOL/L (ref 136–145)
SP GR UR: 1.02 (ref 1–1.03)
TRIGL SERPL-MCNC: 169 MG/DL (ref 0–150)
TSH SERPL DL<=0.005 MIU/L-ACNC: 1.92 UIU/ML (ref 0.27–4.2)
UROBILINOGEN UR STRIP-MCNC: NORMAL MG/DL
VLDLC SERPL CALC-MCNC: 29 MG/DL (ref 5–40)
WBC # BLD AUTO: 9.53 10*3/MM3 (ref 3.4–10.8)
WBC #/AREA URNS HPF: NORMAL /HPF

## 2020-12-05 DIAGNOSIS — I10 BENIGN ESSENTIAL HYPERTENSION: ICD-10-CM

## 2020-12-05 DIAGNOSIS — E11.65 TYPE 2 DIABETES MELLITUS WITH HYPERGLYCEMIA (HCC): ICD-10-CM

## 2020-12-05 DIAGNOSIS — E78.5 HYPERLIPIDEMIA, UNSPECIFIED HYPERLIPIDEMIA TYPE: ICD-10-CM

## 2020-12-05 DIAGNOSIS — E34.9 TESTOSTERONE DEFICIENCY: ICD-10-CM

## 2020-12-05 DIAGNOSIS — N18.30 CHRONIC KIDNEY DISEASE, STAGE III (MODERATE) (HCC): ICD-10-CM

## 2020-12-06 RX ORDER — BLOOD SUGAR DIAGNOSTIC
STRIP MISCELLANEOUS
Qty: 200 EACH | Refills: 2 | Status: SHIPPED | OUTPATIENT
Start: 2020-12-06 | End: 2020-12-08 | Stop reason: SDUPTHER

## 2020-12-08 ENCOUNTER — OFFICE VISIT (OUTPATIENT)
Dept: ENDOCRINOLOGY | Age: 75
End: 2020-12-08

## 2020-12-08 VITALS
DIASTOLIC BLOOD PRESSURE: 78 MMHG | BODY MASS INDEX: 39.77 KG/M2 | HEIGHT: 70 IN | WEIGHT: 277.8 LBS | SYSTOLIC BLOOD PRESSURE: 130 MMHG

## 2020-12-08 DIAGNOSIS — I10 BENIGN ESSENTIAL HYPERTENSION: ICD-10-CM

## 2020-12-08 DIAGNOSIS — E78.2 MIXED HYPERLIPIDEMIA: ICD-10-CM

## 2020-12-08 DIAGNOSIS — E11.65 UNCONTROLLED TYPE 2 DIABETES MELLITUS WITH HYPERGLYCEMIA (HCC): Primary | ICD-10-CM

## 2020-12-08 DIAGNOSIS — E55.9 VITAMIN D DEFICIENCY: ICD-10-CM

## 2020-12-08 DIAGNOSIS — E34.9 TESTOSTERONE DEFICIENCY: ICD-10-CM

## 2020-12-08 PROCEDURE — 99214 OFFICE O/P EST MOD 30 MIN: CPT | Performed by: NURSE PRACTITIONER

## 2020-12-08 RX ORDER — BLOOD SUGAR DIAGNOSTIC
1 STRIP MISCELLANEOUS 4 TIMES DAILY
Qty: 360 EACH | Refills: 1 | Status: SHIPPED | OUTPATIENT
Start: 2020-12-08 | End: 2021-03-08

## 2020-12-08 RX ORDER — FLASH GLUCOSE SENSOR
1 KIT MISCELLANEOUS
Qty: 2 EACH | Refills: 5 | Status: SHIPPED | OUTPATIENT
Start: 2020-12-08 | End: 2021-06-01 | Stop reason: SDUPTHER

## 2020-12-08 RX ORDER — INSULIN GLARGINE 300 U/ML
84 INJECTION, SOLUTION SUBCUTANEOUS DAILY
COMMUNITY
Start: 2020-11-28 | End: 2021-03-26 | Stop reason: SDUPTHER

## 2020-12-08 NOTE — PROGRESS NOTES
"  Subjective    James Mckeon is a 75 y.o. male. he is here today for follow-up.  Chief Complaint   Patient presents with   • Diabetes     type 2 diabetes, recent labs, checks BS 3x a day, eye exam 2020   • Hypertension   • Hyperlipidemia     /78   Ht 177.8 cm (70\")   Wt 126 kg (277 lb 12.8 oz)   BMI 39.86 kg/m²     History of Present Illness   Encounter Diagnoses   Name Primary?   • Uncontrolled type 2 diabetes mellitus with hyperglycemia (CMS/[facility]) Yes   • Mixed hyperlipidemia    • Testosterone deficiency    • Vitamin D deficiency    • Benign essential hypertension    75-year-old male patient here today for follow-up visit.  He has been seen for the above-mentioned problems.  He is up-to-date on his eye exam.  He does have neuropathy in both feet but is not significantly changed.  He has had 1 low blood sugars in the 60 range in which she was symptomatic.  He is currently checking his blood sugars 2-3 times daily.  He does have some fluctuations with his blood sugars.  He does have some blood sugars in the 200 range.  His blood sugars in the morning will vary but are typically in the 1 50-1 70 range.  We discussed increasing his basal insulin to 84 units.  He is also on glimepiride which can cause low blood sugars.  When he did have a low blood sugar he thinks maybe it was related to not eating enough food.  He denies being more active than usual.  He has been advised to pay close attention to any hypoglycemic events so that we can try to prevent them in the future.  He is not happy that his insurance company will not cover the freestyle virgilio.  He would like to get back on the freestyle virgilio and was told by the company that if he checks his blood sugars 3-4 times daily they will consider paying for it.  A record of his blood sugars was recorded at today's visit.  He has had a few pounds of weight loss according to our scales.  He is get ready to go to [place] to his winter home for approximately the " next 6 months.      The following portions of the patient's history were reviewed and updated as appropriate:   Past Medical History:   Diagnosis Date   • Colon polyps    • Coronary artery disease    • Diverticulosis    • Hypertension    • Sleep apnea    • Testosterone deficiency    • Type 2 diabetes mellitus (CMS/HCC)      Past Surgical History:   Procedure Laterality Date   • APPENDECTOMY N/A    • CARDIAC CATHETERIZATION Left 06/09/2003   • CARDIAC CATHETERIZATION Left 01/27/2003   • CHOLECYSTECTOMY N/A    • COLONOSCOPY N/A 1/29/2018    Procedure: COLONOSCOPY TO ANASTOMOSIS WITH COLD BIOPSY POLYPECTOMY;  Surgeon: Olimpia Blanc MD;  Location:  [facility];  Service:    • COLONOSCOPY N/A 7/31/2018    Procedure: COLONOSCOPY into cecum and TI;  Surgeon: Olimpia Blanc MD;  Location:  [facility];  Service: General   • COLONOSCOPY W/ POLYPECTOMY N/A 11/10/2014    Anastomotic ulcer, sigmoid polyp, 5 mm-Dr. Olimpia Blanc   • COLONOSCOPY W/ POLYPECTOMY N/A 11/23/2010    Transverse colon polyp, pan diverticulosis, anastomotic ulcer-Dr. Olimpia Blanc   • COLONOSCOPY W/ POLYPECTOMY N/A 07/17/2006    2 cm cecal polyp, 1 cm ascending colon polyp, 5 mm sigmoid polyp, pandiverticulosis-Dr. Olimpia Blanc   • CORONARY ARTERY BYPASS GRAFT N/A 01/28/2003    CABG x3, Dr. Cosmo Gama   • ENDOSCOPY AND COLONOSCOPY N/A 09/05/2007    Sigmoid diverticulosis, small sigmoid polyp, 2 cm hiatal hernia with possible gastritis, small internal hemorrhoid-Dr. Olimpia Blanc   • HEMICOLECTOMY Right 07/25/2006    Open right hemicolectomy, adhesiolysis, umbilical hernia repair-Dr. Olimpia Blanc   • HERNIA REPAIR N/A 12/17/2010    Repair of incarcerated recurrent multiple incisional hernias, musculofascial advancement flaps with Strattice mesh 16 x 10 cm, adhesiolysis-Dr. Olimpia Blanc   • KNEE SURGERY Right    • LAPAROSCOPIC ADRENALECTOMY Right 10/18/2006    Dr. James Serrano   • NEPHRECTOMY Right 10/18/2006    Dr. James Serrano   • ORIF  "ULNA/RADIUS FRACTURES Left 06/03/2003    Dr. Reynaldo Shea   • UMBILICAL HERNIA REPAIR N/A 07/25/2006    Dr. Olimpia Blanc     Family History   Adopted: Yes   Problem Relation Age of Onset   • Malig Hyperthermia Neg Hx        Current Outpatient Medications   Medication Sig Dispense Refill   • aspirin 81 MG EC tablet Take 1 tablet by mouth Daily.     • Cholecalciferol (VITAMIN D PO) Take 2,000 Units by mouth Daily.     • Continuous Blood Gluc Sensor (FREESTYLE BIRDIE 14 DAY SENSOR) misc 1 application Every 14 (Fourteen) Days. 2 each 5   • escitalopram (LEXAPRO) 5 MG tablet Take 1 tablet by mouth Daily. 90 tablet 1   • fenofibrate (TRICOR) 48 MG tablet TAKE 1 TABLET BY MOUTH DAILY 90 tablet 0   • furosemide (LASIX) 40 MG tablet Take 1.5 tablets by mouth Daily. 135 tablet 3   • glimepiride (AMARYL) 4 MG tablet TAKE 1 TABLET BY MOUTH TWICE DAILY WITH MEALS 180 tablet 0   • Insulin Pen Needle 31G X 8 MM misc Use to inject 2 times daily 100 each 5   • losartan (COZAAR) 25 MG tablet Take 25 mg by mouth daily.     • LUMIGAN 0.01 % ophthalmic drops INSTILL 1 DROP IN OU QHS  5   • metoprolol succinate XL (TOPROL-XL) 50 MG 24 hr tablet TAKE 1 AND 1/2 TABLETS BY MOUTH TWICE DAILY     • OneTouch Ultra test strip TEST FOUR TIMES DAILY 200 each 2   • potassium chloride (K-DUR) 10 MEQ CR tablet Take 10 mEq by mouth 2 (Two) Times a Day.     • pramipexole (MIRAPEX) 0.25 MG tablet TAKE 1 TABLET BY MOUTH DAILY 90 tablet 0   • rosuvastatin (CRESTOR) 20 MG tablet Take 20 mg by mouth daily.     • Syringe/Needle, Disp, (B-D SYRINGE/NEEDLE 3CC/23GX1\") 23G X 1\" 3 ML misc USE A S DIRECTED WITH TESTOSTERONE INJECTION 10 each 2   • tamsulosin (FLOMAX) 0.4 MG capsule 24 hr capsule Take 1 capsule by mouth Daily.     • Testosterone Cypionate (DEPOTESTOTERONE CYPIONATE) 200 MG/ML injection INJECT 1ML INTRAMUSCULAR EVERY 14 DAYS 10 mL 0   • Tradjenta 5 MG tablet tablet TAKE 1 TABLET BY MOUTH DAILY 90 tablet 0     No current facility-administered " medications for this visit.      Allergies   Allergen Reactions   • Toujeo Max Solostar [Insulin Glargine] Cough and Unknown (See Comments)     Sinus drainage   • Victoza [Liraglutide] Diarrhea and Other (See Comments)     Sinus drainage     Social History     Socioeconomic History   • Marital status:      Spouse name: Not on file   • Number of children: Not on file   • Years of education: Not on file   • Highest education level: Not on file   Tobacco Use   • Smoking status: Former Smoker   • Smokeless tobacco: Never Used   • Tobacco comment: 50 yrs ago   Substance and Sexual Activity   • Alcohol use: No   • Drug use: No       Review of Systems  Review of Systems   Constitutional: Negative.  Negative for appetite change and fatigue.   Eyes: Negative for visual disturbance.   Respiratory: Negative for cough.    Cardiovascular: Negative.  Negative for leg swelling.   Gastrointestinal: Negative.  Negative for constipation and diarrhea.   Endocrine: Negative.  Negative for cold intolerance, heat intolerance, polydipsia, polyphagia and polyuria.   Genitourinary: Negative for frequency.   Neurological: Positive for numbness (and tingling in feet).   Psychiatric/Behavioral: Negative for sleep disturbance.        Objective    There were no vitals taken for this visit.  Physical Exam  Vitals signs and nursing note reviewed.   Constitutional:       General: He is not in acute distress.     Appearance: Normal appearance. He is well-developed. He is obese. He is not diaphoretic.   HENT:      Head: Normocephalic and atraumatic.      Right Ear: External ear normal.      Left Ear: External ear normal.      Nose: Nose normal.   Eyes:      General:         Right eye: No discharge.         Left eye: No discharge.      Pupils: Pupils are equal, round, and reactive to light.   Neck:      Musculoskeletal: Normal range of motion and neck supple. No edema or erythema.      Thyroid: No thyromegaly.      Vascular: No carotid bruit.       Trachea: No tracheal deviation.   Cardiovascular:      Rate and Rhythm: Normal rate and regular rhythm.      Heart sounds: Normal heart sounds. No murmur. No friction rub. No gallop.    Pulmonary:      Effort: Pulmonary effort is normal. No respiratory distress.      Breath sounds: Normal breath sounds. No wheezing or rales.   Abdominal:      General: Bowel sounds are normal. There is no distension.      Palpations: Abdomen is soft.      Tenderness: There is no abdominal tenderness.   Musculoskeletal: Normal range of motion.         General: No deformity.   Lymphadenopathy:      Cervical: No cervical adenopathy.   Skin:     General: Skin is warm and dry.      Coloration: Skin is not pale.      Findings: No erythema or rash.   Neurological:      Mental Status: He is alert and oriented to person, place, and time.      Coordination: Coordination normal.   Psychiatric:         Behavior: Behavior normal.         Thought Content: Thought content normal.         Judgment: Judgment normal.         Lab Review  Glucose (mg/dL)   Date Value   08/01/2018 110 (H)   07/31/2018 107 (H)   07/30/2018 111 (H)     Sodium (mmol/L)   Date Value   12/03/2020 140   12/01/2020 141   05/18/2020 141   08/01/2018 140   07/31/2018 140   07/30/2018 142   01/06/2018 145     Potassium (mmol/L)   Date Value   12/03/2020 4.7   12/01/2020 4.8   05/18/2020 4.7   08/01/2018 4.3   07/31/2018 3.8   07/31/2018 3.9   01/06/2018 4.2     Chloride (mmol/L)   Date Value   12/03/2020 102   12/01/2020 102   05/18/2020 101   08/01/2018 102   07/31/2018 103   07/30/2018 106   01/06/2018 101     CO2 (mmol/L)   Date Value   08/01/2018 23.8   07/31/2018 22.1   07/30/2018 24.9     Total CO2 (mmol/L)   Date Value   12/03/2020 29.6 (H)   12/01/2020 29.2 (H)   05/18/2020 27.8   01/06/2018 31 (H)     BUN (mg/dL)   Date Value   12/03/2020 36 (H)   12/01/2020 33 (H)   05/18/2020 36 (H)   08/01/2018 14   07/31/2018 20   07/30/2018 29 (H)   01/06/2018 35 (H)      Creatinine (mg/dL)   Date Value   12/03/2020 1.83 (H)   12/01/2020 1.87 (H)   05/18/2020 1.78 (H)   08/01/2018 1.37 (H)   07/31/2018 1.31 (H)   07/30/2018 1.44 (H)   01/06/2018 1.7 (H)     Hemoglobin A1C (%)   Date Value   12/03/2020 7.80 (H)   12/01/2020 8.06 (H)   05/18/2020 8.87 (H)   07/30/2018 7.40 (H)     Triglycerides (mg/dL)   Date Value   12/03/2020 169 (H)   12/01/2020 204 (H)   05/18/2020 221 (H)     LDL Cholesterol  (mg/dL)   Date Value   05/18/2020 43   11/12/2019 62   05/22/2019 54     LDL Chol Calc (NIH) (mg/dL)   Date Value   12/03/2020 55   12/01/2020 53       Assessment/Plan      1. Uncontrolled type 2 diabetes mellitus with hyperglycemia (CMS/[facility])    2. Mixed hyperlipidemia    3. Testosterone deficiency    4. Vitamin D deficiency    .    Medications prescribed:  Outpatient Encounter Medications as of 12/8/2020   Medication Sig Dispense Refill   • aspirin 81 MG EC tablet Take 1 tablet by mouth Daily.     • Cholecalciferol (VITAMIN D PO) Take 2,000 Units by mouth Daily.     • Continuous Blood Gluc Sensor (FREESTYLE BIRDIE 14 DAY SENSOR) misc 1 application Every 14 (Fourteen) Days. 2 each 5   • escitalopram (LEXAPRO) 5 MG tablet Take 1 tablet by mouth Daily. 90 tablet 1   • fenofibrate (TRICOR) 48 MG tablet TAKE 1 TABLET BY MOUTH DAILY 90 tablet 0   • furosemide (LASIX) 40 MG tablet Take 1.5 tablets by mouth Daily. 135 tablet 3   • glimepiride (AMARYL) 4 MG tablet TAKE 1 TABLET BY MOUTH TWICE DAILY WITH MEALS 180 tablet 0   • Insulin Pen Needle 31G X 8 MM misc Use to inject 2 times daily 100 each 5   • losartan (COZAAR) 25 MG tablet Take 25 mg by mouth daily.     • LUMIGAN 0.01 % ophthalmic drops INSTILL 1 DROP IN OU QHS  5   • metoprolol succinate XL (TOPROL-XL) 50 MG 24 hr tablet TAKE 1 AND 1/2 TABLETS BY MOUTH TWICE DAILY     • OneTouch Ultra test strip TEST FOUR TIMES DAILY 200 each 2   • potassium chloride (K-DUR) 10 MEQ CR tablet Take 10 mEq by mouth 2 (Two) Times a Day.     • pramipexole  "(MIRAPEX) 0.25 MG tablet TAKE 1 TABLET BY MOUTH DAILY 90 tablet 0   • rosuvastatin (CRESTOR) 20 MG tablet Take 20 mg by mouth daily.     • Syringe/Needle, Disp, (B-D SYRINGE/NEEDLE 3CC/23GX1\") 23G X 1\" 3 ML misc USE A S DIRECTED WITH TESTOSTERONE INJECTION 10 each 2   • tamsulosin (FLOMAX) 0.4 MG capsule 24 hr capsule Take 1 capsule by mouth Daily.     • Testosterone Cypionate (DEPOTESTOTERONE CYPIONATE) 200 MG/ML injection INJECT 1ML INTRAMUSCULAR EVERY 14 DAYS 10 mL 0   • Tradjenta 5 MG tablet tablet TAKE 1 TABLET BY MOUTH DAILY 90 tablet 0     No facility-administered encounter medications on file as of 12/8/2020.        Orders placed during this encounter include:  No orders of the defined types were placed in this encounter.      In summary patient was seen and examined.  His blood sugars were reviewed.  He does feel symptomatic if his blood sugars are in the 80-90 range.  His lowest blood sugar has been in the 60s.  His A1c has improved slightly to 7.8.  He was educated today regarding what can cause low blood sugars.  He has been advised to monitor his blood sugars closely.  He is wanting to get a freestyle virgilio however he has been denied from his insurance company in the past I will send a new prescription to see if anything changes with his insurance company.  Currently he is taking 1 shot of insulin a day and I have increased that to 84 units.  He is also on glimepiride twice daily which can cause low blood sugars.  He was educated today regarding causes of low blood sugars and prevention.  His blood pressure is improved at this visit.  His medication list was reviewed and updated.  He is on testosterone therapy for hypogonadism.  His cholesterol is in satisfactory range.  He is currently on rosuvastatin 20.  No medications were added.  The only change is the increase in his Toujeo insulin to 84 units.  He is to follow-up in 6 months with labs.  He has been encouraged to reach out to the office should any " questions or concerns prior to his next visit

## 2020-12-09 ENCOUNTER — OFFICE VISIT (OUTPATIENT)
Dept: INTERNAL MEDICINE | Facility: CLINIC | Age: 75
End: 2020-12-09

## 2020-12-09 VITALS
HEIGHT: 70 IN | HEART RATE: 79 BPM | SYSTOLIC BLOOD PRESSURE: 122 MMHG | DIASTOLIC BLOOD PRESSURE: 68 MMHG | WEIGHT: 278 LBS | OXYGEN SATURATION: 98 % | BODY MASS INDEX: 39.8 KG/M2

## 2020-12-09 DIAGNOSIS — Z00.00 MEDICARE ANNUAL WELLNESS VISIT, SUBSEQUENT: Primary | ICD-10-CM

## 2020-12-09 DIAGNOSIS — I10 BENIGN ESSENTIAL HYPERTENSION: ICD-10-CM

## 2020-12-09 DIAGNOSIS — N18.30 STAGE 3 CHRONIC KIDNEY DISEASE, UNSPECIFIED WHETHER STAGE 3A OR 3B CKD (HCC): ICD-10-CM

## 2020-12-09 DIAGNOSIS — F41.9 ANXIETY: ICD-10-CM

## 2020-12-09 DIAGNOSIS — E78.2 MIXED HYPERLIPIDEMIA: ICD-10-CM

## 2020-12-09 DIAGNOSIS — E11.8 DM (DIABETES MELLITUS), TYPE 2 WITH COMPLICATIONS (HCC): ICD-10-CM

## 2020-12-09 PROBLEM — R19.7 DIARRHEA: Status: RESOLVED | Noted: 2019-12-06 | Resolved: 2020-12-09

## 2020-12-09 PROCEDURE — 99214 OFFICE O/P EST MOD 30 MIN: CPT | Performed by: INTERNAL MEDICINE

## 2020-12-09 PROCEDURE — G0439 PPPS, SUBSEQ VISIT: HCPCS | Performed by: INTERNAL MEDICINE

## 2020-12-09 NOTE — PATIENT INSTRUCTIONS
Fall Prevention in the Home, Adult  Falls can cause injuries and can affect people from all age groups. There are many simple things that you can do to make your home safe and to help prevent falls. Ask for help when making these changes, if needed.  What actions can I take to prevent falls?  General instructions  · Use good lighting in all rooms. Replace any light bulbs that burn out.  · Turn on lights if it is dark. Use night-lights.  · Place frequently used items in easy-to-reach places. Lower the shelves around your home if necessary.  · Set up furniture so that there are clear paths around it. Avoid moving your furniture around.  · Remove throw rugs and other tripping hazards from the floor.  · Avoid walking on wet floors.  · Fix any uneven floor surfaces.  · Add color or contrast paint or tape to grab bars and handrails in your home. Place contrasting color strips on the first and last steps of stairways.  · When you use a stepladder, make sure that it is completely opened and that the sides are firmly locked. Have someone hold the ladder while you are using it. Do not climb a closed stepladder.  · Be aware of any and all pets.  What can I do in the bathroom?         · Keep the floor dry. Immediately clean up any water that spills onto the floor.  · Remove soap buildup in the tub or shower on a regular basis.  · Use non-skid mats or decals on the floor of the tub or shower.  · Attach bath mats securely with double-sided, non-slip rug tape.  · If you need to sit down while you are in the shower, use a plastic, non-slip stool.  · Install grab bars by the toilet and in the tub and shower. Do not use towel bars as grab bars.  What can I do in the bedroom?  · Make sure that a bedside light is easy to reach.  · Do not use oversized bedding that drapes onto the floor.  · Have a firm chair that has side arms to use for getting dressed.  What can I do in the kitchen?  · Clean up any spills right away.  · If you  need to reach for something above you, use a sturdy step stool that has a grab bar.  · Keep electrical cables out of the way.  · Do not use floor polish or wax that makes floors slippery. If you must use wax, make sure that it is non-skid floor wax.  What can I do in the stairways?  · Do not leave any items on the stairs.  · Make sure that you have a light switch at the top of the stairs and the bottom of the stairs. Have them installed if you do not have them.  · Make sure that there are handrails on both sides of the stairs. Fix handrails that are broken or loose. Make sure that handrails are as long as the stairways.  · Install non-slip stair treads on all stairs in your home.  · Avoid having throw rugs at the top or bottom of stairways, or secure the rugs with carpet tape to prevent them from moving.  · Choose a carpet design that does not hide the edge of steps on the stairway.  · Check any carpeting to make sure that it is firmly attached to the stairs. Fix any carpet that is loose or worn.  What can I do on the outside of my home?  · Use bright outdoor lighting.  · Regularly repair the edges of walkways and driveways and fix any cracks.  · Remove high doorway thresholds.  · Trim any shrubbery on the main path into your home.  · Regularly check that handrails are securely fastened and in good repair. Both sides of any steps should have handrails.  · Install guardrails along the edges of any raised decks or porches.  · Clear walkways of debris and clutter, including tools and rocks.  · Have leaves, snow, and ice cleared regularly.  · Use sand or salt on walkways during winter months.  · In the garage, clean up any spills right away, including grease or oil spills.  What other actions can I take?  · Wear closed-toe shoes that fit well and support your feet. Wear shoes that have rubber soles or low heels.  · Use mobility aids as needed, such as canes, walkers, scooters, and crutches.  · Review your medicines with  your health care provider. Some medicines can cause dizziness or changes in blood pressure, which increase your risk of falling.  Talk with your health care provider about other ways that you can decrease your risk of falls. This may include working with a physical therapist or  to improve your strength, balance, and endurance.  Where to find more information  · Centers for Disease Control and Prevention, [place]: https://www.cdc.gov  · National [place] on Aging: https://pb5gozv.stacey.nih.gov  Contact a health care provider if:  · You are afraid of falling at home.  · You feel weak, drowsy, or dizzy at home.  · You fall at home.  Summary  · There are many simple things that you can do to make your home safe and to help prevent falls.  · Ways to make your home safe include removing tripping hazards and installing grab bars in the bathroom.  · Ask for help when making these changes in your home.  This information is not intended to replace advice given to you by your health care provider. Make sure you discuss any questions you have with your health care provider.  Document Revised: 2018 Document Reviewed: 2018  [employer] Patient Education ©  [employer]    Medicare Wellness  Personal Prevention Plan of Service     Date of Office Visit:  2020  Encounter Provider:  Radha Haji MD  Place of Service:  [facility] INTERNAL MEDICINE  Patient Name: James Mckeon  :  1945    As part of the Medicare Wellness portion of your visit today, we are providing you with this personalized preventive plan of services (PPPS). This plan is based upon recommendations of the [place] Preventive Services Task Force (USPSTF) and the Advisory Committee on Immunization Practices (ACIP).    This lists the preventive care services that should be considered, and provides dates of when you are due. Items listed as completed are up-to-date and do not require any further  intervention.    Health Maintenance   Topic Date Due   • TDAP/TD VACCINES (2 - Td) 01/01/2018   • ANNUAL WELLNESS VISIT  11/18/2020   • DIABETIC FOOT EXAM  11/18/2020   • DIABETIC EYE EXAM  05/12/2021   • HEMOGLOBIN A1C  06/03/2021   • LIPID PANEL  12/03/2021   • URINE MICROALBUMIN  12/03/2021   • COLONOSCOPY  07/31/2022   • INFLUENZA VACCINE  Completed   • Pneumococcal Vaccine 65+  Completed   • AAA SCREEN (ONE-TIME)  Completed   • ZOSTER VACCINE  Completed   • HEPATITIS C SCREENING  Addressed       No orders of the defined types were placed in this encounter.      No follow-ups on file.

## 2020-12-09 NOTE — PROGRESS NOTES
The ABCs of the Annual Wellness Visit  Subsequent Medicare Wellness Visit    Chief Complaint   Patient presents with   • Medicare Wellness-subsequent   • Diabetes   • Hypertension   • Hyperlipidemia       Subjective   History of Present Illness:  James Mckeon is a 75 y.o. male who presents for a Subsequent Medicare Wellness Visit.    Dm-2 with CKD-3.  Fair control.  Followed by endo.  I encouraged exercise. Numbers are stable.   HTN.  Control is good.   HLD.  Control is excellent.    Anxiety.  Well controlled with lexapro.      HEALTH RISK ASSESSMENT    Recent Hospitalizations:  No hospitalization(s) within the last year.    Current Medical Providers:  Patient Care Team:  Radha Haji MD as PCP - General  Radha Haji MD as PCP - Family Medicine  Meri Ocasio MD as Consulting Physician (Endocrinology)  Olimpia Blanc MD as Surgeon (General Surgery)  Leti Galan MD as Consulting Physician (Nephrology)    Smoking Status:  Social History     Tobacco Use   Smoking Status Former Smoker   Smokeless Tobacco Never Used   Tobacco Comment    50 yrs ago       Alcohol Consumption:  Social History     Substance and Sexual Activity   Alcohol Use No       Depression Screen:   PHQ-2/PHQ-9 Depression Screening 12/9/2020   Little interest or pleasure in doing things 0   Feeling down, depressed, or hopeless 0   Trouble falling or staying asleep, or sleeping too much -   Feeling tired or having little energy -   Poor appetite or overeating -   Feeling bad about yourself - or that you are a failure or have let yourself or your family down -   Trouble concentrating on things, such as reading the newspaper or watching television -   Moving or speaking so slowly that other people could have noticed. Or the opposite - being so fidgety or restless that you have been moving around a lot more than usual -   Thoughts that you would be better off dead, or of hurting yourself in some way -   Total Score 0   If you  checked off any problems, how difficult have these problems made it for you to do your work, take care of things at home, or get along with other people? -       Fall Risk Screen:  JT Fall Risk Assessment was completed, and patient is at HIGH risk for falls. Assessment completed on:12/9/2020    Health Habits and Functional and Cognitive Screening:  Functional & Cognitive Status 12/9/2020   Do you have difficulty preparing food and eating? No   Do you have difficulty bathing yourself, getting dressed or grooming yourself? No   Do you have difficulty using the toilet? No   Do you have difficulty moving around from place to place? No   Do you have trouble with steps or getting out of a bed or a chair? No   Current Diet Well Balanced Diet   Dental Exam Up to date   Eye Exam Up to date   Exercise (times per week) 0 times per week   Current Exercise Activities Include None   Do you need help using the phone?  No   Are you deaf or do you have serious difficulty hearing?  No   Do you need help with transportation? No   Do you need help shopping? No   Do you need help preparing meals?  No   Do you need help with housework?  No   Do you need help with laundry? No   Do you need help taking your medications? No   Do you need help managing money? No   Do you ever drive or ride in a car without wearing a seat belt? No   Have you felt unusual stress, anger or loneliness in the last month? No   Who do you live with? Spouse   If you need help, do you have trouble finding someone available to you? No   Have you been bothered in the last four weeks by sexual problems? No   Do you have difficulty concentrating, remembering or making decisions? No         Does the patient have evidence of cognitive impairment? No    Asprin use counseling:Taking ASA appropriately as indicated    Age-appropriate Screening Schedule:  Refer to the list below for future screening recommendations based on patient's age, sex and/or medical conditions.  Orders for these recommended tests are listed in the plan section. The patient has been provided with a written plan.    Health Maintenance   Topic Date Due   • TDAP/TD VACCINES (2 - Td) 01/01/2018   • DIABETIC EYE EXAM  05/12/2021   • HEMOGLOBIN A1C  06/03/2021   • LIPID PANEL  12/03/2021   • URINE MICROALBUMIN  12/03/2021   • DIABETIC FOOT EXAM  12/09/2021   • COLONOSCOPY  07/31/2022   • INFLUENZA VACCINE  Completed   • ZOSTER VACCINE  Completed          The following portions of the patient's history were reviewed and updated as appropriate: allergies, current medications, past family history, past medical history, past social history, past surgical history and problem list.    Outpatient Medications Prior to Visit   Medication Sig Dispense Refill   • aspirin 81 MG EC tablet Take 1 tablet by mouth Daily.     • Cholecalciferol (VITAMIN D PO) Take 2,000 Units by mouth Daily.     • Continuous Blood Gluc Sensor (FreeStyle Jigar 14 Day Sensor) misc 1 application Every 14 (Fourteen) Days. 2 each 5   • escitalopram (LEXAPRO) 5 MG tablet Take 1 tablet by mouth Daily. 90 tablet 1   • fenofibrate (TRICOR) 48 MG tablet TAKE 1 TABLET BY MOUTH DAILY 90 tablet 0   • furosemide (LASIX) 40 MG tablet Take 1.5 tablets by mouth Daily. 135 tablet 3   • glimepiride (AMARYL) 4 MG tablet TAKE 1 TABLET BY MOUTH TWICE DAILY WITH MEALS 180 tablet 0   • glucose blood (OneTouch Ultra) test strip 1 each by Other route 4 (Four) Times a Day for 90 days. for testing 360 each 1   • Insulin Pen Needle 31G X 8 MM misc Use to inject 2 times daily 100 each 5   • losartan (COZAAR) 25 MG tablet Take 25 mg by mouth daily.     • LUMIGAN 0.01 % ophthalmic drops INSTILL 1 DROP IN OU QHS  5   • metoprolol succinate XL (TOPROL-XL) 50 MG 24 hr tablet TAKE 1 AND 1/2 TABLETS BY MOUTH TWICE DAILY     • potassium chloride (K-DUR) 10 MEQ CR tablet Take 10 mEq by mouth 2 (Two) Times a Day.     • pramipexole (MIRAPEX) 0.25 MG tablet TAKE 1 TABLET BY MOUTH DAILY 90  "tablet 0   • rosuvastatin (CRESTOR) 20 MG tablet Take 20 mg by mouth daily.     • Syringe/Needle, Disp, (B-D SYRINGE/NEEDLE 3CC/23GX1\") 23G X 1\" 3 ML misc USE A S DIRECTED WITH TESTOSTERONE INJECTION 10 each 2   • tamsulosin (FLOMAX) 0.4 MG capsule 24 hr capsule Take 1 capsule by mouth Daily.     • Testosterone Cypionate (DEPOTESTOTERONE CYPIONATE) 200 MG/ML injection INJECT 1ML INTRAMUSCULAR EVERY 14 DAYS 10 mL 0   • Toujeo SoloStar 300 UNIT/ML solution pen-injector injection Inject 84 Units under the skin into the appropriate area as directed Daily.     • Tradjenta 5 MG tablet tablet TAKE 1 TABLET BY MOUTH DAILY 90 tablet 0     No facility-administered medications prior to visit.        Patient Active Problem List   Diagnosis   • Anxiety   • Benign essential hypertension   • Benign prostatic hyperplasia   • Chronic kidney disease, stage III (moderate)   • Hyperlipidemia   • Testosterone deficiency   • Obstructive sleep apnea syndrome   • Uncontrolled type 2 diabetes mellitus (CMS/HCC)   • Coronary arteriosclerosis in native artery   • History of coronary artery bypass surgery   • Vitamin D deficiency   • History of colon cancer   • Restless legs syndrome (RLS)   • Pulmonary hypertension, moderate to severe (CMS/HCC)       Advanced Care Planning:  ACP discussion was held with the patient during this visit. Patient has an advance directive (not in EMR), copy requested.    Review of Systems   Constitutional: Negative for fever.   Respiratory: Negative.    Cardiovascular: Negative.        Compared to one year ago, the patient feels his physical health is the same.  Compared to one year ago, the patient feels his mental health is the same.    Reviewed chart for potential of high risk medication in the elderly: yes  Reviewed chart for potential of harmful drug interactions in the elderly:yes    Objective         Vitals:    12/09/20 0903   BP: 122/68   Pulse: 79   SpO2: 98%   Weight: 126 kg (278 lb)   Height: 177.8 cm " "(70\")   PainSc: 0-No pain       Body mass index is 39.89 kg/m².  Discussed the patient's BMI with him. The BMI is above average; BMI management plan is completed.    Physical Exam  Constitutional:       Appearance: He is well-developed.   HENT:      Head: Normocephalic and atraumatic.      Right Ear: Hearing and tympanic membrane normal.      Left Ear: Hearing and tympanic membrane normal.      Mouth/Throat:      Pharynx: No posterior oropharyngeal erythema.   Neck:      Musculoskeletal: Neck supple.      Thyroid: No thyromegaly.   Cardiovascular:      Rate and Rhythm: Normal rate and regular rhythm.      Heart sounds: Normal heart sounds. No murmur.   Pulmonary:      Effort: Pulmonary effort is normal.      Breath sounds: Normal breath sounds.   Abdominal:      General: There is no distension.      Palpations: Abdomen is soft.      Tenderness: There is no abdominal tenderness.   Feet:      Right foot:      Skin integrity: Skin integrity normal. No ulcer.      Left foot:      Skin integrity: Skin integrity normal. No ulcer.      Comments: Diabetic Foot Exam Performed    Lymphadenopathy:      Cervical: No cervical adenopathy.   Skin:     General: Skin is warm and dry.   Neurological:      Mental Status: He is alert and oriented to person, place, and time.   Psychiatric:         Speech: Speech normal.         Behavior: Behavior normal.         Thought Content: Thought content normal.         Judgment: Judgment normal.         Lab Results   Component Value Date     (H) 12/03/2020    CHLPL 120 12/03/2020    TRIG 169 (H) 12/03/2020    HDL 36 (L) 12/03/2020    LDL 55 12/03/2020    VLDL 29 12/03/2020    HGBA1C 7.80 (H) 12/03/2020        Assessment/Plan   Medicare Risks and Personalized Health Plan  CMS Preventative Services Quick Reference  Colon Cancer Screening  Inactivity/Sedentary    The above risks/problems have been discussed with the patient.  Pertinent information has been shared with the patient in the " After Visit Summary.  Follow up plans and orders are seen below in the Assessment/Plan Section.    Diagnoses and all orders for this visit:    1. Medicare annual wellness visit, subsequent (Primary)    2. DM (diabetes mellitus), type 2 with complications (CMS/[facility])    3. Benign essential hypertension    4. Mixed hyperlipidemia    5. Stage 3 chronic kidney disease, unspecified whether stage 3a or 3b CKD    6. Anxiety      Dm-2.  Continue current.  Add exercise.   HTN.  The patient will continue current regimen.      HLD.  Continue Crestor and Tricor.    CKD-3.  He is stable.   Anxiety.  Continue Lexapro.      Follow Up:  Return in about 1 year (around 12/9/2021) for Medicare Wellness.     An After Visit Summary and PPPS were given to the patient.

## 2020-12-14 RX ORDER — GLIMEPIRIDE 4 MG/1
TABLET ORAL
Qty: 180 TABLET | Refills: 0 | Status: SHIPPED | OUTPATIENT
Start: 2020-12-14 | End: 2021-03-23 | Stop reason: SDUPTHER

## 2020-12-14 RX ORDER — FENOFIBRATE 48 MG/1
48 TABLET, COATED ORAL DAILY
Qty: 90 TABLET | Refills: 0 | Status: SHIPPED | OUTPATIENT
Start: 2020-12-14 | End: 2021-03-22 | Stop reason: SDUPTHER

## 2021-02-19 DIAGNOSIS — R79.89 LOW TESTOSTERONE: Primary | ICD-10-CM

## 2021-02-20 RX ORDER — NEEDLES, SAFETY 22GX1 1/2"
NEEDLE, DISPOSABLE MISCELLANEOUS
Qty: 10 EACH | Refills: 2 | Status: SHIPPED | OUTPATIENT
Start: 2021-02-20 | End: 2021-02-22 | Stop reason: SDUPTHER

## 2021-02-20 RX ORDER — TESTOSTERONE CYPIONATE 200 MG/ML
INJECTION, SOLUTION INTRAMUSCULAR
Qty: 6 ML | Refills: 1 | Status: SHIPPED | OUTPATIENT
Start: 2021-02-20 | End: 2021-02-22 | Stop reason: SDUPTHER

## 2021-02-22 DIAGNOSIS — R79.89 LOW TESTOSTERONE: ICD-10-CM

## 2021-02-22 NOTE — TELEPHONE ENCOUNTER
Pt testosterone was sent to wrong pharmacy.     Lm with patient to check with pharmacy later today

## 2021-02-22 NOTE — TELEPHONE ENCOUNTER
Patient called to get Testosterone refilll sent to  at [place] [address] [facility] [address]...[phone]

## 2021-02-25 RX ORDER — NEEDLES, SAFETY 22GX1 1/2"
NEEDLE, DISPOSABLE MISCELLANEOUS
Qty: 10 EACH | Refills: 2 | Status: SHIPPED | OUTPATIENT
Start: 2021-02-25

## 2021-02-25 RX ORDER — TESTOSTERONE CYPIONATE 200 MG/ML
INJECTION, SOLUTION INTRAMUSCULAR
Qty: 6 ML | Refills: 0 | Status: SHIPPED | OUTPATIENT
Start: 2021-02-25

## 2021-03-09 DIAGNOSIS — Z23 IMMUNIZATION DUE: ICD-10-CM

## 2021-03-22 RX ORDER — FENOFIBRATE 48 MG/1
48 TABLET, COATED ORAL DAILY
Qty: 90 TABLET | Refills: 0 | Status: SHIPPED | OUTPATIENT
Start: 2021-03-22 | End: 2021-06-01 | Stop reason: SDUPTHER

## 2021-03-23 RX ORDER — GLIMEPIRIDE 4 MG/1
4 TABLET ORAL 2 TIMES DAILY WITH MEALS
Qty: 180 TABLET | Refills: 0 | Status: SHIPPED | OUTPATIENT
Start: 2021-03-23 | End: 2021-06-01 | Stop reason: SDUPTHER

## 2021-03-26 ENCOUNTER — TELEPHONE (OUTPATIENT)
Dept: ENDOCRINOLOGY | Age: 76
End: 2021-03-26

## 2021-03-26 RX ORDER — INSULIN GLARGINE 300 U/ML
84 INJECTION, SOLUTION SUBCUTANEOUS DAILY
Qty: 8.4 ML | Refills: 0 | Status: SHIPPED | OUTPATIENT
Start: 2021-03-26 | End: 2021-05-12 | Stop reason: SDUPTHER

## 2021-05-12 NOTE — TELEPHONE ENCOUNTER
Patient is needing to have a refill on     Toujeo SoloStar 300 UNIT/ML solution pen-injector injection    Sent to     [id number] - [place], [place] - [address] AT [facility] [address] - [phone]  - [phone] FX   Phone:  [phone]   Fax:  [phone]

## 2021-05-13 RX ORDER — INSULIN GLARGINE 300 U/ML
84 INJECTION, SOLUTION SUBCUTANEOUS DAILY
Qty: 8.4 ML | Refills: 0 | Status: SHIPPED | OUTPATIENT
Start: 2021-05-13 | End: 2021-06-01 | Stop reason: SDUPTHER

## 2021-05-14 ENCOUNTER — OFFICE VISIT (OUTPATIENT)
Dept: SLEEP MEDICINE | Facility: HOSPITAL | Age: 76
End: 2021-05-14

## 2021-05-14 VITALS
DIASTOLIC BLOOD PRESSURE: 92 MMHG | SYSTOLIC BLOOD PRESSURE: 180 MMHG | BODY MASS INDEX: 39.08 KG/M2 | OXYGEN SATURATION: 94 % | HEIGHT: 70 IN | HEART RATE: 101 BPM | WEIGHT: 273 LBS

## 2021-05-14 DIAGNOSIS — N18.30 CHRONIC KIDNEY DISEASE, STAGE III (MODERATE) (HCC): ICD-10-CM

## 2021-05-14 DIAGNOSIS — G25.81 RESTLESS LEGS SYNDROME (RLS): ICD-10-CM

## 2021-05-14 DIAGNOSIS — G47.33 OBSTRUCTIVE SLEEP APNEA: Primary | ICD-10-CM

## 2021-05-14 DIAGNOSIS — E66.01 OBESITY, MORBID, BMI 40.0-49.9 (HCC): ICD-10-CM

## 2021-05-14 PROCEDURE — G0463 HOSPITAL OUTPT CLINIC VISIT: HCPCS

## 2021-05-14 RX ORDER — PRAMIPEXOLE DIHYDROCHLORIDE 0.25 MG/1
0.25 TABLET ORAL DAILY
Qty: 90 TABLET | Refills: 3 | Status: SHIPPED | OUTPATIENT
Start: 2021-05-14 | End: 2021-05-20

## 2021-05-17 RX ORDER — ESCITALOPRAM OXALATE 5 MG/1
5 TABLET ORAL DAILY
Qty: 90 TABLET | Refills: 1 | Status: SHIPPED | OUTPATIENT
Start: 2021-05-17 | End: 2021-11-15

## 2021-05-20 ENCOUNTER — TELEPHONE (OUTPATIENT)
Dept: SLEEP MEDICINE | Facility: HOSPITAL | Age: 76
End: 2021-05-20

## 2021-05-20 RX ORDER — ROPINIROLE 0.5 MG/1
0.5 TABLET, FILM COATED ORAL NIGHTLY
Qty: 30 TABLET | Refills: 5 | Status: SHIPPED | OUTPATIENT
Start: 2021-05-20 | End: 2021-11-19 | Stop reason: SDUPTHER

## 2021-05-20 NOTE — TELEPHONE ENCOUNTER
----- Message from UMU Lr sent at 5/20/2021  2:56 PM EDT -----  Regarding: RE: call patient  I s/w with pt. Discussed below. He agrees to start Requip 0.5mg and DC Pramipexole.    I will E scribe the medication to his pharmacy.    ----- Message -----  From: Humberto Barone MD  Sent: 5/17/2021   6:00 PM EDT  To: UMU Lr  Subject: call patient                                     Please call him - has CKD, if agrees change his pramipexole to requip 0.5 mg qhs

## 2021-06-01 ENCOUNTER — OFFICE VISIT (OUTPATIENT)
Dept: ENDOCRINOLOGY | Age: 76
End: 2021-06-01

## 2021-06-01 VITALS
HEIGHT: 70 IN | DIASTOLIC BLOOD PRESSURE: 82 MMHG | WEIGHT: 276.6 LBS | OXYGEN SATURATION: 98 % | BODY MASS INDEX: 39.6 KG/M2 | SYSTOLIC BLOOD PRESSURE: 132 MMHG

## 2021-06-01 DIAGNOSIS — E78.5 HYPERLIPIDEMIA: ICD-10-CM

## 2021-06-01 DIAGNOSIS — E11.29 TYPE 2 DIABETES MELLITUS WITH OTHER DIABETIC KIDNEY COMPLICATION, WITH LONG-TERM CURRENT USE OF INSULIN (HCC): ICD-10-CM

## 2021-06-01 DIAGNOSIS — E11.65 TYPE 2 DIABETES MELLITUS WITH HYPERGLYCEMIA (HCC): Primary | ICD-10-CM

## 2021-06-01 DIAGNOSIS — Z79.4 TYPE 2 DIABETES MELLITUS WITH OTHER DIABETIC KIDNEY COMPLICATION, WITH LONG-TERM CURRENT USE OF INSULIN (HCC): ICD-10-CM

## 2021-06-01 PROCEDURE — 99214 OFFICE O/P EST MOD 30 MIN: CPT | Performed by: NURSE PRACTITIONER

## 2021-06-01 RX ORDER — LINAGLIPTIN 5 MG/1
5 TABLET, FILM COATED ORAL DAILY
Qty: 90 TABLET | Refills: 1 | Status: SHIPPED | OUTPATIENT
Start: 2021-06-01 | End: 2021-12-10

## 2021-06-01 RX ORDER — INSULIN GLARGINE 300 U/ML
80 INJECTION, SOLUTION SUBCUTANEOUS DAILY
Qty: 25.5 ML | Refills: 0 | Status: SHIPPED | OUTPATIENT
Start: 2021-06-01 | End: 2021-09-16

## 2021-06-01 RX ORDER — FENOFIBRATE 48 MG/1
48 TABLET, COATED ORAL DAILY
Qty: 90 TABLET | Refills: 1 | Status: SHIPPED | OUTPATIENT
Start: 2021-06-01 | End: 2021-12-10

## 2021-06-01 RX ORDER — LOSARTAN POTASSIUM 25 MG/1
25 TABLET ORAL DAILY
Qty: 90 TABLET | Refills: 1 | Status: SHIPPED | OUTPATIENT
Start: 2021-06-01

## 2021-06-01 RX ORDER — FLASH GLUCOSE SENSOR
1 KIT MISCELLANEOUS
Qty: 2 EACH | Refills: 5 | Status: SHIPPED | OUTPATIENT
Start: 2021-06-01

## 2021-06-01 RX ORDER — ROSUVASTATIN CALCIUM 20 MG/1
20 TABLET, COATED ORAL DAILY
Qty: 90 TABLET | Refills: 1 | Status: SHIPPED | OUTPATIENT
Start: 2021-06-01

## 2021-06-01 RX ORDER — GLIMEPIRIDE 4 MG/1
4 TABLET ORAL 2 TIMES DAILY WITH MEALS
Qty: 180 TABLET | Refills: 1 | Status: SHIPPED | OUTPATIENT
Start: 2021-06-01 | End: 2021-12-10

## 2021-06-01 RX ORDER — NATEGLINIDE 120 MG/1
120 TABLET ORAL
COMMUNITY
End: 2021-06-01

## 2021-06-01 NOTE — PROGRESS NOTES
"Chief Complaint  Diabetes and Med Refill    Subjective          James Mckeon presents to [facility] ENDOCRINOLOGY  History of Present Illness     Moving to [place] this month  Can't tolerate victoza     Type 2 dm    Diagnosed about 10 years ago.   Today in clinic pt reports being on tradjenta 5mg daily , glimepiride 4mg BID (MORGAN), toujeo 70-75u daily   Checks BG - 4x/day (120s-170s)  Dm retinopathy - no,Last eye exam - 12/2020  Dm nephropathy - yes, sees nephrologist   Dm neuropathy - yes  CAD - yes, MI 19 years ago  CVA - no  Episodes of hypoglycemia - occasionally   Pt is somewhat physically active. weight has been stable.   Pt tries to follow DM diet for most part.   On ARB  Lab Results   Component Value Date    HGBA1C 7.80 (H) 12/03/2020         HLP  Rosuvastatin 20mg daily, fenofibrate    Lab Results   Component Value Date    CHLPL 120 12/03/2020    TRIG 169 (H) 12/03/2020    HDL 36 (L) 12/03/2020    LDL 55 12/03/2020         Objective   Vital Signs:   /82 (BP Location: Right arm, Patient Position: Sitting, Cuff Size: Adult)   Ht 177.8 cm (70\")   Wt 125 kg (276 lb 9.6 oz)   SpO2 98%   BMI 39.69 kg/m²     Physical Exam  Vitals reviewed.   Constitutional:       General: He is not in acute distress.  HENT:      Head: Normocephalic and atraumatic.   Cardiovascular:      Rate and Rhythm: Normal rate and regular rhythm.   Pulmonary:      Effort: Pulmonary effort is normal. No respiratory distress.   Musculoskeletal:         General: No signs of injury. Normal range of motion.      Cervical back: Normal range of motion and neck supple.   Skin:     General: Skin is warm and dry.   Neurological:      Mental Status: He is alert and oriented to person, place, and time. Mental status is at baseline.   Psychiatric:         Mood and Affect: Mood normal.         Behavior: Behavior normal.         Thought Content: Thought content normal.         Judgment: Judgment normal.          Result Review : "   The following data was reviewed by: UMU Novak on 06/01/2021:  Common labs    Common Labsle 12/1/20 12/1/20 12/1/20 12/1/20 12/3/20 12/3/20 12/3/20 12/3/20 12/3/20 12/3/20 5/20/21    0923 0923 0923 0923 1013 1013 1013 1013 1013 1013    Glucose   116 (A)   106 (A)        BUN   33 (A)   36 (A)        Creatinine   1.87 (A)   1.83 (A)        eGFR Non  Am   36 (A)   36 (A)        eGFR  Am   43 (A)   44 (A)        Sodium   141   140        Potassium   4.8   4.7        Chloride   102   102        Calcium   8.9   9.4        Total Protein   6.3   6.7        Albumin   3.80   4.00        Total Bilirubin   0.4   0.3        Alkaline Phosphatase   47   47        AST (SGOT)   16   14        ALT (SGPT)   31   30        WBC     9.53      10.71   Hemoglobin 14.1    14.7      14.3   Hematocrit 45.9    46.7      49.2   Platelets     190      209   Total Cholesterol  120     120       Triglycerides  204 (A)     169 (A)       HDL Cholesterol  34 (A)     36 (A)       LDL Cholesterol   53     55       Hemoglobin A1C    8.06 (A)    7.80 (A)      Microalbumin, Urine         40.7     PSA          1.940    (A) Abnormal value       Comments are available for some flowsheets but are not being displayed.                     Assessment and Plan    Diagnoses and all orders for this visit:    1. Type 2 diabetes mellitus with hyperglycemia (CMS/HCC) (Primary)  -     Tradjenta 5 MG tablet tablet; Take 1 tablet by mouth Daily.  Dispense: 90 tablet; Refill: 1  -     rosuvastatin (CRESTOR) 20 MG tablet; Take 1 tablet by mouth Daily.  Dispense: 90 tablet; Refill: 1  -     losartan (COZAAR) 25 MG tablet; Take 1 tablet by mouth Daily.  Dispense: 90 tablet; Refill: 1  -     glimepiride (AMARYL) 4 MG tablet; Take 1 tablet by mouth 2 (Two) Times a Day With Meals.  Dispense: 180 tablet; Refill: 1  -     fenofibrate (TRICOR) 48 MG tablet; Take 1 tablet by mouth Daily.  Dispense: 90 tablet; Refill: 1  -     Toujeo SoloStar 300 UNIT/ML  solution pen-injector injection; Inject 80 Units under the skin into the appropriate area as directed Daily for 90 days.  Dispense: 25.5 mL; Refill: 0    2. Type 2 diabetes mellitus with other diabetic kidney complication, with long-term current use of insulin (CMS/[facility])  -     Hemoglobin A1c  -     Lipid Panel    3. Hyperlipidemia  -     rosuvastatin (CRESTOR) 20 MG tablet; Take 1 tablet by mouth Daily.  Dispense: 90 tablet; Refill: 1  -     fenofibrate (TRICOR) 48 MG tablet; Take 1 tablet by mouth Daily.  Dispense: 90 tablet; Refill: 1    Other orders  -     Continuous Blood Gluc Sensor (FreeStyle Jigar 14 Day Sensor) misc; 1 application Every 14 (Fourteen) Days.  Dispense: 2 each; Refill: 5        Follow Up {Instructions Charge Capture  Follow-up Communications :23}  No follow-ups on file.     Goal a1c <7.5%  Continue current regimen until labs are back with tradjenta 5mg daily , glimepiride 4mg BID (MORGAN), toujeo 70-75u daily   Hypoglycemia education on insulin and MORGAN  Try to get jigar sensor   Continue statin   On ARB  Yearly eye exams  Diabetic foot care  6 month supply of medications but no further refills unless we continue to do regular visits with him    Patient was given instructions and counseling regarding his condition or for health maintenance advice. Please see specific information pulled into the AVS if appropriate.     [facility] Novak

## 2021-06-01 NOTE — PATIENT INSTRUCTIONS
Hypoglycemia  Hypoglycemia occurs when the level of sugar (glucose) in the blood is too low. Hypoglycemia can happen in people who have or do not have diabetes. It can develop quickly, and it can be a medical emergency. For most people, a blood glucose level below 70 mg/dL (3.9 mmol/L) is considered hypoglycemia.  Glucose is a type of sugar that provides the body's main source of energy. Certain hormones (insulin and glucagon) control the level of glucose in the blood. Insulin lowers blood glucose, and glucagon raises blood glucose. Hypoglycemia can result from having too much insulin in the bloodstream, or from not eating enough food that contains glucose. You may also have reactive hypoglycemia, which happens within 4 hours after eating a meal.  What are the causes?  Hypoglycemia occurs most often in people who have diabetes and may be caused by:  · Diabetes medicine.  · Not eating enough, or not eating often enough.  · Increased physical activity.  · Drinking alcohol on an empty stomach.  If you do not have diabetes, hypoglycemia may be caused by:  · A tumor in the pancreas.  · Not eating enough, or not eating for long periods at a time (fasting).  · A severe infection or illness.  · Problems after having bariatric surgery.  · Organ failure, such as kidney or liver failure.  · Certain medicines.  What increases the risk?  Hypoglycemia is more likely to develop in people who:  · Have diabetes and take medicines to lower blood glucose.  · Abuse alcohol.  · Have a severe illness.  What are the signs or symptoms?  Symptoms vary depending on whether the condition is mild, moderate, or severe.  Mild hypoglycemia  · Hunger.  · Anxiety.  · Sweating and feeling clammy.  · Dizziness or feeling light-headed.  · Sleepiness or restless sleep.  · Nausea.  · Increased heart rate.  · Headache.  · Blurry vision.  · Irritability.  · Tingling or numbness around the mouth, lips, or tongue.  · A change in coordination.  Moderate  hypoglycemia  · Confusion and poor judgment.  · Behavior changes.  · Weakness.  · Irregular heartbeat.  Severe hypoglycemia  Severe hypoglycemia is a medical emergency. It can cause:  · Fainting.  · Seizures.  · Loss of consciousness (coma).  · Death.  How is this diagnosed?  Hypoglycemia is diagnosed with a blood test to measure your blood glucose level. This blood test is done while you are having symptoms. Your health care provider may also do a physical exam and review your medical history.  How is this treated?  This condition can be treated by immediately eating or drinking something that contains sugar with 15 grams of rapid-acting carbohydrate, such as:  · 4 oz (120 mL) of fruit juice.  · 4-6 oz (120-150 mL) of regular soda (not diet soda).  · 8 oz (240 mL) of low-fat milk.  · Several pieces of hard candy. Check food labels to find out how many to eat for 15 grams.  · 1 Tbsp (15 mL) of sugar or honey.  Treating hypoglycemia if you have diabetes  If you are alert and able to swallow safely, follow the 15:15 rule:  · Take 15 grams of a rapid-acting carbohydrate. Talk with your health care provider about how much you should take. Options for getting 15 grams of rapid-acting carbohydrate include:  ? Glucose tablets (take 4 tablets).  ? Several pieces of hard candy. Check food labels to find out how many pieces to eat for 15 grams.  ? 4 oz (120 mL) of fruit juice.  ? 4-6 oz (120-150 mL) of regular (not diet) soda.  ? 1 Tbsp (15 mL) of honey or sugar.  · Check your blood glucose 15 minutes after you take the carbohydrate.  · If the repeat blood glucose level is still at or below 70 mg/dL (3.9 mmol/L), take 15 grams of a carbohydrate again.  · If your blood glucose level does not increase above 70 mg/dL (3.9 mmol/L) after 3 tries, seek emergency medical care.  · After your blood glucose level returns to normal, eat a meal or a snack within 1 hour.    Treating severe hypoglycemia  Severe hypoglycemia is when your  blood glucose level is at or below 54 mg/dL (3 mmol/L). Severe hypoglycemia is a medical emergency. Get medical help right away.  If you have severe hypoglycemia and you cannot eat or drink, you will need to be given glucagon. A family member or close friend should learn how to check your blood glucose and how to give you glucagon. Ask your health care provider if you need to have an emergency glucagon kit available.  Severe hypoglycemia may need to be treated in a hospital. The treatment may include getting glucose through an IV. You may also need treatment for the cause of your hypoglycemia.  Follow these instructions at home:    General instructions  · Take over-the-counter and prescription medicines only as told by your health care provider.  · Monitor your blood glucose as told by your health care provider.  · If you drink alcohol:  ? Limit how much you use to:  § 0-1 drink a day for nonpregnant women.  § 0-2 drinks a day for men.  ? Be aware of how much alcohol is in your drink. In the [place] one drink equals one 12 oz bottle of beer (355 mL), one 5 oz glass of wine (148 mL), or one 1½ oz glass of hard liquor (44 mL).  · Keep all follow-up visits as told by your health care provider. This is important.  If you have diabetes:  · Always have a rapid-acting carbohydrate (15 grams) option with you to treat low blood glucose.  · Follow your diabetes management plan as directed. Make sure you:  ? Know the symptoms of hypoglycemia. It is important to treat it right away to prevent it from becoming severe.  ? Check your blood glucose as often as told. Always check before and after exercise.  ? Always check your blood glucose before you drive a motorized vehicle.  ? Take your medicines as told.  ? Follow your meal plan. Eat on time, and do not skip meals.  · Share your diabetes management plan with people in your workplace, school, and household.  · Carry a medical alert card or wear medical alert jewelry.  Contact a  health care provider if:  · You have problems keeping your blood glucose in your target range.  · You have frequent episodes of hypoglycemia.  Get help right away if:  · You continue to have hypoglycemia symptoms after eating or drinking something that contains 15 grams of fast-acting carbohydrate and you cannot get your blood glucose above 70 mg/dL (3.9 mmol/L) while following the 15:15 rule.  · Your blood glucose is at or below 54 mg/dL (3 mmol/L).  · You have a seizure.  · You faint.  These symptoms may represent a serious problem that is an emergency. Do not wait to see if the symptoms will go away. Get medical help right away. Call your local emergency services (911 in the [place] Do not drive yourself to the hospital.  Summary  · Hypoglycemia occurs when the level of sugar (glucose) in the blood is too low.  · Hypoglycemia can happen in people who have or do not have diabetes. It can develop quickly, and it can be a medical emergency.  · Make sure you know the symptoms of hypoglycemia and how to treat it.  · Always have a rapid-acting carbohydrate snack with you to treat low blood sugar.  This information is not intended to replace advice given to you by your health care provider. Make sure you discuss any questions you have with your health care provider.  Document Revised: 11/11/2020 Document Reviewed: 11/11/2020  [employer] Patient Education © 2021 [employer]      Diabetes Mellitus and Foot Care  Foot care is an important part of your health, especially when you have diabetes. Diabetes may cause you to have problems because of poor blood flow (circulation) to your feet and legs, which can cause your skin to:  · Become thinner and drier.  · Break more easily.  · Heal more slowly.  · Peel and crack.  You may also have nerve damage (neuropathy) in your legs and feet, causing decreased feeling in them. This means that you may not notice minor injuries to your feet that could lead to more serious problems.  Noticing and addressing any potential problems early is the best way to prevent future foot problems.  How to care for your feet  Foot hygiene  · Wash your feet daily with warm water and mild soap. Do not use hot water. Then, pat your feet and the areas between your toes until they are completely dry. Do not soak your feet as this can dry your skin.  · Trim your toenails straight across. Do not dig under them or around the cuticle. File the edges of your nails with an emery board or nail file.  · Apply a moisturizing lotion or petroleum jelly to the skin on your feet and to dry, brittle toenails. Use lotion that does not contain alcohol and is unscented. Do not apply lotion between your toes.  Shoes and socks  · Wear clean socks or stockings every day. Make sure they are not too tight. Do not wear knee-high stockings since they may decrease blood flow to your legs.  · Wear shoes that fit properly and have enough cushioning. Always look in your shoes before you put them on to be sure there are no objects inside.  · To break in new shoes, wear them for just a few hours a day. This prevents injuries on your feet.  Wounds, scrapes, corns, and calluses  · Check your feet daily for blisters, cuts, bruises, sores, and redness. If you cannot see the bottom of your feet, use a mirror or ask someone for help.  · Do not cut corns or calluses or try to remove them with medicine.  · If you find a minor scrape, cut, or break in the skin on your feet, keep it and the skin around it clean and dry. You may clean these areas with mild soap and water. Do not clean the area with peroxide, alcohol, or iodine.  · If you have a wound, scrape, corn, or callus on your foot, look at it several times a day to make sure it is healing and not infected. Check for:  ? Redness, swelling, or pain.  ? Fluid or blood.  ? Warmth.  ? Pus or a bad smell.  General instructions  · Do not cross your legs. This may decrease blood flow to your feet.  · Do  not use heating pads or hot water bottles on your feet. They may burn your skin. If you have lost feeling in your feet or legs, you may not know this is happening until it is too late.  · Protect your feet from hot and cold by wearing shoes, such as at the beach or on hot pavement.  · Schedule a complete foot exam at least once a year (annually) or more often if you have foot problems. If you have foot problems, report any cuts, sores, or bruises to your health care provider immediately.  Contact a health care provider if:  · You have a medical condition that increases your risk of infection and you have any cuts, sores, or bruises on your feet.  · You have an injury that is not healing.  · You have redness on your legs or feet.  · You feel burning or tingling in your legs or feet.  · You have pain or cramps in your legs and feet.  · Your legs or feet are numb.  · Your feet always feel cold.  · You have pain around a toenail.  Get help right away if:  · You have a wound, scrape, corn, or callus on your foot and:  ? You have pain, swelling, or redness that gets worse.  ? You have fluid or blood coming from the wound, scrape, corn, or callus.  ? Your wound, scrape, corn, or callus feels warm to the touch.  ? You have pus or a bad smell coming from the wound, scrape, corn, or callus.  ? You have a fever.  ? You have a red line going up your leg.  Summary  · Check your feet every day for cuts, sores, red spots, swelling, and blisters.  · Moisturize feet and legs daily.  · Wear shoes that fit properly and have enough cushioning.  · If you have foot problems, report any cuts, sores, or bruises to your health care provider immediately.  · Schedule a complete foot exam at least once a year (annually) or more often if you have foot problems.  This information is not intended to replace advice given to you by your health care provider. Make sure you discuss any questions you have with your health care provider.  Document  Revised: 09/09/2020 Document Reviewed: 01/19/2018  [employer] Patient Education © 2021 [employer]

## 2021-06-02 LAB
CHOLEST SERPL-MCNC: 128 MG/DL (ref 0–200)
HBA1C MFR BLD: 8.3 % (ref 4.8–5.6)
HDLC SERPL-MCNC: 36 MG/DL (ref 40–60)
IMP & REVIEW OF LAB RESULTS: NORMAL
LDLC SERPL CALC-MCNC: 52 MG/DL (ref 0–100)
TRIGL SERPL-MCNC: 254 MG/DL (ref 0–150)
VLDLC SERPL CALC-MCNC: 40 MG/DL (ref 5–40)

## 2021-06-03 NOTE — PROGRESS NOTES
Notify patient of labs. He is moving to [place]. Recommend adding 5u to his current touje dose, improve diabetic diet and physical activity.  Send us blood sugars in one month please

## 2021-09-16 DIAGNOSIS — E11.65 TYPE 2 DIABETES MELLITUS WITH HYPERGLYCEMIA (HCC): ICD-10-CM

## 2021-09-16 RX ORDER — INSULIN GLARGINE 300 U/ML
80 INJECTION, SOLUTION SUBCUTANEOUS DAILY
Qty: 25.5 ML | Refills: 0 | Status: SHIPPED | OUTPATIENT
Start: 2021-09-16 | End: 2021-12-21

## 2021-11-15 RX ORDER — ESCITALOPRAM OXALATE 5 MG/1
5 TABLET ORAL DAILY
Qty: 90 TABLET | Refills: 1 | Status: SHIPPED | OUTPATIENT
Start: 2021-11-15 | End: 2022-05-17

## 2021-11-19 RX ORDER — ROPINIROLE 0.5 MG/1
0.5 TABLET, FILM COATED ORAL NIGHTLY
Qty: 30 TABLET | Refills: 5 | Status: SHIPPED | OUTPATIENT
Start: 2021-11-19 | End: 2022-06-16 | Stop reason: SDUPTHER

## 2021-12-10 DIAGNOSIS — E78.5 HYPERLIPIDEMIA: ICD-10-CM

## 2021-12-10 DIAGNOSIS — E11.65 TYPE 2 DIABETES MELLITUS WITH HYPERGLYCEMIA (HCC): ICD-10-CM

## 2021-12-10 RX ORDER — LINAGLIPTIN 5 MG/1
5 TABLET, FILM COATED ORAL DAILY
Qty: 90 TABLET | Refills: 1 | Status: SHIPPED | OUTPATIENT
Start: 2021-12-10

## 2021-12-10 RX ORDER — GLIMEPIRIDE 4 MG/1
TABLET ORAL
Qty: 180 TABLET | Refills: 1 | Status: SHIPPED | OUTPATIENT
Start: 2021-12-10 | End: 2022-12-14

## 2021-12-10 RX ORDER — FENOFIBRATE 48 MG/1
48 TABLET, COATED ORAL DAILY
Qty: 90 TABLET | Refills: 1 | Status: SHIPPED | OUTPATIENT
Start: 2021-12-10 | End: 2022-12-10

## 2021-12-21 DIAGNOSIS — E11.65 TYPE 2 DIABETES MELLITUS WITH HYPERGLYCEMIA (HCC): ICD-10-CM

## 2021-12-21 RX ORDER — INSULIN GLARGINE 300 U/ML
80 INJECTION, SOLUTION SUBCUTANEOUS DAILY
Qty: 25.5 ML | Refills: 0 | Status: SHIPPED | OUTPATIENT
Start: 2021-12-21 | End: 2022-03-21

## 2022-05-17 RX ORDER — ESCITALOPRAM OXALATE 5 MG/1
5 TABLET ORAL DAILY
Qty: 90 TABLET | Refills: 1 | Status: SHIPPED | OUTPATIENT
Start: 2022-05-17 | End: 2022-11-29

## 2022-05-31 DIAGNOSIS — E11.65 TYPE 2 DIABETES MELLITUS WITH HYPERGLYCEMIA: ICD-10-CM

## 2022-05-31 RX ORDER — LINAGLIPTIN 5 MG/1
5 TABLET, FILM COATED ORAL DAILY
Qty: 90 TABLET | Refills: 1 | OUTPATIENT
Start: 2022-05-31

## 2022-06-02 DIAGNOSIS — E11.65 TYPE 2 DIABETES MELLITUS WITH HYPERGLYCEMIA: ICD-10-CM

## 2022-06-03 RX ORDER — LINAGLIPTIN 5 MG/1
5 TABLET, FILM COATED ORAL DAILY
Qty: 90 TABLET | Refills: 1 | OUTPATIENT
Start: 2022-06-03

## 2022-06-16 RX ORDER — ROPINIROLE 0.5 MG/1
0.5 TABLET, FILM COATED ORAL NIGHTLY
Qty: 30 TABLET | Refills: 5 | Status: SHIPPED | OUTPATIENT
Start: 2022-06-16 | End: 2022-12-14 | Stop reason: SDUPTHER

## 2022-11-15 RX ORDER — ESCITALOPRAM OXALATE 5 MG/1
5 TABLET ORAL DAILY
Qty: 90 TABLET | Refills: 1 | OUTPATIENT
Start: 2022-11-15

## 2022-11-29 RX ORDER — ESCITALOPRAM OXALATE 5 MG/1
5 TABLET ORAL DAILY
Qty: 90 TABLET | Refills: 0 | Status: SHIPPED | OUTPATIENT
Start: 2022-11-29 | End: 2022-12-14 | Stop reason: SDUPTHER

## 2022-12-05 DIAGNOSIS — E55.9 VITAMIN D DEFICIENCY: ICD-10-CM

## 2022-12-05 DIAGNOSIS — E78.2 MIXED HYPERLIPIDEMIA: ICD-10-CM

## 2022-12-05 DIAGNOSIS — E11.8 CONTROLLED DIABETES MELLITUS TYPE 2 WITH COMPLICATIONS, UNSPECIFIED WHETHER LONG TERM INSULIN USE: ICD-10-CM

## 2022-12-05 DIAGNOSIS — Z12.5 SCREENING PSA (PROSTATE SPECIFIC ANTIGEN): ICD-10-CM

## 2022-12-05 DIAGNOSIS — I10 BENIGN ESSENTIAL HYPERTENSION: Primary | ICD-10-CM

## 2022-12-07 LAB
25(OH)D3+25(OH)D2 SERPL-MCNC: 22.3 NG/ML (ref 30–100)
ALBUMIN SERPL-MCNC: 4 G/DL (ref 3.7–4.7)
ALBUMIN/GLOB SERPL: 1.4 {RATIO} (ref 1.2–2.2)
ALP SERPL-CCNC: 57 IU/L (ref 44–121)
ALT SERPL-CCNC: 25 IU/L (ref 0–44)
APPEARANCE UR: CLEAR
AST SERPL-CCNC: 15 IU/L (ref 0–40)
BACTERIA #/AREA URNS HPF: NORMAL /[HPF]
BASOPHILS # BLD AUTO: 0.1 X10E3/UL (ref 0–0.2)
BASOPHILS NFR BLD AUTO: 1 %
BILIRUB SERPL-MCNC: 0.4 MG/DL (ref 0–1.2)
BILIRUB UR QL STRIP: NEGATIVE
BUN SERPL-MCNC: 34 MG/DL (ref 8–27)
BUN/CREAT SERPL: 24 (ref 10–24)
CALCIUM SERPL-MCNC: 9.5 MG/DL (ref 8.6–10.2)
CASTS URNS QL MICRO: NORMAL /LPF
CHLORIDE SERPL-SCNC: 99 MMOL/L (ref 96–106)
CHOLEST SERPL-MCNC: 132 MG/DL (ref 100–199)
CO2 SERPL-SCNC: 26 MMOL/L (ref 20–29)
COLOR UR: YELLOW
CREAT SERPL-MCNC: 1.44 MG/DL (ref 0.76–1.27)
EGFRCR SERPLBLD CKD-EPI 2021: 51 ML/MIN/1.73
EOSINOPHIL # BLD AUTO: 0.3 X10E3/UL (ref 0–0.4)
EOSINOPHIL NFR BLD AUTO: 4 %
EPI CELLS #/AREA URNS HPF: NORMAL /HPF (ref 0–10)
ERYTHROCYTE [DISTWIDTH] IN BLOOD BY AUTOMATED COUNT: 14.6 % (ref 11.6–15.4)
GLOBULIN SER CALC-MCNC: 2.8 G/DL (ref 1.5–4.5)
GLUCOSE SERPL-MCNC: 256 MG/DL (ref 70–99)
GLUCOSE UR QL STRIP: ABNORMAL
HBA1C MFR BLD: 8.7 % (ref 4.8–5.6)
HCT VFR BLD AUTO: 42 % (ref 37.5–51)
HDLC SERPL-MCNC: 35 MG/DL
HGB BLD-MCNC: 13.8 G/DL (ref 13–17.7)
HGB UR QL STRIP: NEGATIVE
IMM GRANULOCYTES # BLD AUTO: 0.1 X10E3/UL (ref 0–0.1)
IMM GRANULOCYTES NFR BLD AUTO: 1 %
KETONES UR QL STRIP: NEGATIVE
LDLC SERPL CALC-MCNC: 56 MG/DL (ref 0–99)
LEUKOCYTE ESTERASE UR QL STRIP: NEGATIVE
LYMPHOCYTES # BLD AUTO: 1.7 X10E3/UL (ref 0.7–3.1)
LYMPHOCYTES NFR BLD AUTO: 20 %
MCH RBC QN AUTO: 26.8 PG (ref 26.6–33)
MCHC RBC AUTO-ENTMCNC: 32.9 G/DL (ref 31.5–35.7)
MCV RBC AUTO: 82 FL (ref 79–97)
MICRO URNS: ABNORMAL
MICRO URNS: ABNORMAL
MONOCYTES # BLD AUTO: 0.8 X10E3/UL (ref 0.1–0.9)
MONOCYTES NFR BLD AUTO: 9 %
NEUTROPHILS # BLD AUTO: 5.6 X10E3/UL (ref 1.4–7)
NEUTROPHILS NFR BLD AUTO: 65 %
NITRITE UR QL STRIP: NEGATIVE
PH UR STRIP: 6 [PH] (ref 5–7.5)
PLATELET # BLD AUTO: 170 X10E3/UL (ref 150–450)
POTASSIUM SERPL-SCNC: 4.6 MMOL/L (ref 3.5–5.2)
PROT SERPL-MCNC: 6.8 G/DL (ref 6–8.5)
PROT UR QL STRIP: ABNORMAL
PSA SERPL-MCNC: 0.9 NG/ML (ref 0–4)
RBC # BLD AUTO: 5.15 X10E6/UL (ref 4.14–5.8)
RBC #/AREA URNS HPF: NORMAL /HPF (ref 0–2)
SODIUM SERPL-SCNC: 138 MMOL/L (ref 134–144)
SP GR UR STRIP: 1.02 (ref 1–1.03)
TRIGL SERPL-MCNC: 262 MG/DL (ref 0–149)
TSH SERPL DL<=0.005 MIU/L-ACNC: 2.4 UIU/ML (ref 0.45–4.5)
UROBILINOGEN UR STRIP-MCNC: 0.2 MG/DL (ref 0.2–1)
VLDLC SERPL CALC-MCNC: 41 MG/DL (ref 5–40)
WBC # BLD AUTO: 8.6 X10E3/UL (ref 3.4–10.8)
WBC #/AREA URNS HPF: NORMAL /HPF (ref 0–5)

## 2022-12-14 ENCOUNTER — OFFICE VISIT (OUTPATIENT)
Dept: INTERNAL MEDICINE | Facility: CLINIC | Age: 77
End: 2022-12-14

## 2022-12-14 VITALS
HEART RATE: 68 BPM | HEIGHT: 70 IN | BODY MASS INDEX: 39.94 KG/M2 | OXYGEN SATURATION: 98 % | SYSTOLIC BLOOD PRESSURE: 132 MMHG | WEIGHT: 279 LBS | DIASTOLIC BLOOD PRESSURE: 78 MMHG

## 2022-12-14 DIAGNOSIS — I10 BENIGN ESSENTIAL HYPERTENSION: ICD-10-CM

## 2022-12-14 DIAGNOSIS — E11.9 CONTROLLED TYPE 2 DIABETES MELLITUS WITHOUT COMPLICATION, WITH LONG-TERM CURRENT USE OF INSULIN: ICD-10-CM

## 2022-12-14 DIAGNOSIS — Z12.11 SCREENING FOR COLON CANCER: ICD-10-CM

## 2022-12-14 DIAGNOSIS — F41.9 ANXIETY: ICD-10-CM

## 2022-12-14 DIAGNOSIS — Z00.00 MEDICARE ANNUAL WELLNESS VISIT, SUBSEQUENT: Primary | ICD-10-CM

## 2022-12-14 DIAGNOSIS — Z79.4 CONTROLLED TYPE 2 DIABETES MELLITUS WITHOUT COMPLICATION, WITH LONG-TERM CURRENT USE OF INSULIN: ICD-10-CM

## 2022-12-14 DIAGNOSIS — N18.31 STAGE 3A CHRONIC KIDNEY DISEASE: ICD-10-CM

## 2022-12-14 DIAGNOSIS — E78.2 MIXED HYPERLIPIDEMIA: ICD-10-CM

## 2022-12-14 PROCEDURE — 1160F RVW MEDS BY RX/DR IN RCRD: CPT | Performed by: INTERNAL MEDICINE

## 2022-12-14 PROCEDURE — 99213 OFFICE O/P EST LOW 20 MIN: CPT | Performed by: INTERNAL MEDICINE

## 2022-12-14 PROCEDURE — 1170F FXNL STATUS ASSESSED: CPT | Performed by: INTERNAL MEDICINE

## 2022-12-14 PROCEDURE — 1126F AMNT PAIN NOTED NONE PRSNT: CPT | Performed by: INTERNAL MEDICINE

## 2022-12-14 RX ORDER — FUROSEMIDE 40 MG/1
60 TABLET ORAL DAILY
Qty: 90 TABLET | Refills: 3 | Status: SHIPPED | OUTPATIENT
Start: 2022-12-14

## 2022-12-14 RX ORDER — ESCITALOPRAM OXALATE 5 MG/1
5 TABLET ORAL DAILY
Qty: 90 TABLET | Refills: 3 | Status: SHIPPED | OUTPATIENT
Start: 2022-12-14

## 2022-12-14 RX ORDER — ROPINIROLE 0.5 MG/1
0.5 TABLET, FILM COATED ORAL NIGHTLY
Qty: 90 TABLET | Refills: 3 | Status: SHIPPED | OUTPATIENT
Start: 2022-12-14

## 2022-12-14 NOTE — PATIENT INSTRUCTIONS
Medicare Wellness  Personal Prevention Plan of Service     Date of Office Visit:    Encounter Provider:  Radha Haji MD  Place of Service:  [facility]  Patient Name: James Mckeon  :  1945    As part of the Medicare Wellness portion of your visit today, we are providing you with this personalized preventive plan of services (PPPS). This plan is based upon recommendations of the [place] Preventive Services Task Force (USPSTF) and the Advisory Committee on Immunization Practices (ACIP).    This lists the preventive care services that should be considered, and provides dates of when you are due. Items listed as completed are up-to-date and do not require any further intervention.    Health Maintenance   Topic Date Due    TDAP/TD VACCINES (3 - Td or Tdap) 2018    DIABETIC EYE EXAM  2020    URINE MICROALBUMIN  2021    ANNUAL WELLNESS VISIT  2021    DIABETIC FOOT EXAM  2021    COLORECTAL CANCER SCREENING  2022    HEMOGLOBIN A1C  2023    LIPID PANEL  2023    INFLUENZA VACCINE  Completed    Pneumococcal Vaccine 65+  Completed    AAA SCREEN (ONE-TIME)  Completed    ZOSTER VACCINE  Completed    HEPATITIS C SCREENING  Addressed    COVID-19 Vaccine  Discontinued       Orders Placed This Encounter   Procedures    Ambulatory Referral For Screening Colonoscopy     Referral Priority:   Routine     Referral Type:   Diagnostic Medical     Referral Reason:   Specialty Services Required     Referral Location:   [facility]     Referred to Provider:   Olimpia Blanc MD     Requested Specialty:   General Surgery     Number of Visits Requested:   1       No follow-ups on file.

## 2022-12-14 NOTE — PROGRESS NOTES
"The ABCs of the Annual Wellness Visit  Subsequent Medicare Wellness Visit    Subjective    James Mckeon is a 77 y.o. male who presents for a Subsequent Medicare Wellness Visit.    The following portions of the patient's history were reviewed and   updated as appropriate: allergies, current medications, past family history, past medical history, past social history, past surgical history and problem list.    Compared to one year ago, the patient feels his physical   health is the same.    Compared to one year ago, the patient feels his mental   health is the same.    Recent Hospitalizations:  He was not admitted to the hospital during the last year.       Current Medical Providers:  Patient Care Team:  Radha Haji MD as PCP - General  Radha Haji MD as PCP - Family Medicine  Meri Ocasio MD as Consulting Physician (Endocrinology)  Olimpia Blanc MD as Surgeon (General Surgery)  Leti Galan MD as Consulting Physician (Nephrology)    Outpatient Medications Prior to Visit   Medication Sig Dispense Refill   • aspirin 81 MG EC tablet Take 1 tablet by mouth Daily.     • Cholecalciferol (VITAMIN D PO) Take 2,000 Units by mouth Daily.     • Continuous Blood Gluc Sensor (Digital Tech FrontierStyle Jigar 14 Day Sensor) misc 1 application Every 14 (Fourteen) Days. 2 each 5   • Insulin Pen Needle 31G X 8 MM misc Use to inject 2 times daily 100 each 5   • losartan (COZAAR) 25 MG tablet Take 1 tablet by mouth Daily. 90 tablet 1   • LUMIGAN 0.01 % ophthalmic drops INSTILL 1 DROP IN OU QHS  5   • metoprolol succinate XL (TOPROL-XL) 50 MG 24 hr tablet TAKE 1 AND 1/2 TABLETS BY MOUTH TWICE DAILY     • potassium chloride (K-DUR) 10 MEQ CR tablet Take 10 mEq by mouth 2 (Two) Times a Day.     • rosuvastatin (CRESTOR) 20 MG tablet Take 1 tablet by mouth Daily. 90 tablet 1   • Syringe/Needle, Disp, (B-D SYRINGE/NEEDLE 3CC/23GX1\") 23G X 1\" 3 ML misc USE A S DIRECTED WITH TESTOSTERONE INJECTION 10 each 2   • tamsulosin " (FLOMAX) 0.4 MG capsule 24 hr capsule Take 1 capsule by mouth Daily.     • Testosterone Cypionate (DEPOTESTOTERONE CYPIONATE) 200 MG/ML injection INJECT 1ML INTRAMUSCULAR EVERY 14 DAYS 6 mL 0   • Tradjenta 5 MG tablet tablet TAKE 1 TABLET BY MOUTH DAILY 90 tablet 1   • escitalopram (LEXAPRO) 5 MG tablet TAKE 1 TABLET BY MOUTH DAILY 90 tablet 0   • furosemide (LASIX) 40 MG tablet Take 1.5 tablets by mouth Daily. 135 tablet 3   • fenofibrate (TRICOR) 48 MG tablet TAKE 1 TABLET BY MOUTH DAILY 90 tablet 1   • Toujeo SoloStar 300 UNIT/ML solution pen-injector injection INJECT 80 UNITS UNDER THE SKIN INTO THE APPROPRIATE AREA AS DIRECTED DAILY FOR 90 DAYS 25.5 mL 0   • glimepiride (AMARYL) 4 MG tablet TAKE 1 TABLET BY MOUTH TWICE DAILY WITH MEALS 180 tablet 1   • rOPINIRole (Requip) 0.5 MG tablet Take 1 tablet by mouth Every Night for 30 days. Take 2 hour before bedtime. 30 tablet 5     No facility-administered medications prior to visit.       No opioid medication identified on active medication list. I have reviewed chart for other potential  high risk medication/s and harmful drug interactions in the elderly.          Aspirin is on active medication list. Aspirin use is indicated based on review of current medical condition/s. Pros and cons of this therapy have been discussed today. Benefits of this medication outweigh potential harm.  Patient has been encouraged to continue taking this medication.  .      Patient Active Problem List   Diagnosis   • Anxiety   • Benign essential hypertension   • Benign prostatic hyperplasia   • Chronic kidney disease, stage III (moderate) ([facility])   • Diabetes mellitus type II, controlled, with no complications ([facility])   • Hyperlipidemia   • Testosterone deficiency   • Obstructive sleep apnea syndrome   • Coronary arteriosclerosis in native artery   • History of coronary artery bypass surgery   • Vitamin D deficiency   • History of colon cancer   • Restless legs syndrome (RLS)   • Pulmonary  "hypertension, moderate to severe (HCC)     Advance Care Planning  Advance Directive is not on file.  ACP discussion was held with the patient during this visit. Patient has an advance directive (not in EMR), copy requested.     Objective    Vitals:    12/14/22 0942   BP: 132/78   Pulse: 68   SpO2: 98%   Weight: 127 kg (279 lb)   Height: 177.8 cm (70\")   PainSc: 0-No pain     Estimated body mass index is 40.03 kg/m² as calculated from the following:    Height as of this encounter: 177.8 cm (70\").    Weight as of this encounter: 127 kg (279 lb).    Class 3 Severe Obesity (BMI >=40). Obesity-related health conditions include the following: diabetes mellitus. Obesity is unchanged. BMI is is above average; BMI management plan is completed. We discussed portion control and increasing exercise.      Does the patient have evidence of cognitive impairment? No    Lab Results   Component Value Date    CHLPL 132 12/06/2022    TRIG 262 (H) 12/06/2022    HDL 35 (L) 12/06/2022    LDL 56 12/06/2022    VLDL 41 (H) 12/06/2022    HGBA1C 8.7 (H) 12/06/2022        HEALTH RISK ASSESSMENT    Smoking Status:  Social History     Tobacco Use   Smoking Status Former   Smokeless Tobacco Never   Tobacco Comments    50 yrs ago     Alcohol Consumption:  Social History     Substance and Sexual Activity   Alcohol Use No     Fall Risk Screen:    STEADI Fall Risk Assessment was completed, and patient is at LOW risk for falls.Assessment completed on:12/14/2022    Depression Screening:  PHQ-2/PHQ-9 Depression Screening 12/14/2022   Retired Total Score -   Little Interest or Pleasure in Doing Things 0-->not at all   Feeling Down, Depressed or Hopeless 0-->not at all   PHQ-9: Brief Depression Severity Measure Score 0       Health Habits and Functional and Cognitive Screening:  Functional & Cognitive Status 12/14/2022   Do you have difficulty preparing food and eating? No   Do you have difficulty bathing yourself, getting dressed or grooming yourself? No "   Do you have difficulty using the toilet? No   Do you have difficulty moving around from place to place? No   Do you have trouble with steps or getting out of a bed or a chair? No   Current Diet Unhealthy Diet   Dental Exam Up to date   Eye Exam Up to date   Exercise (times per week) 0 times per week   Current Exercises Include No Regular Exercise   Current Exercise Activities Include -   Do you need help using the phone?  No   Are you deaf or do you have serious difficulty hearing?  No   Do you need help with transportation? No   Do you need help shopping? No   Do you need help preparing meals?  No   Do you need help with housework?  No   Do you need help with laundry? No   Do you need help taking your medications? No   Do you need help managing money? No   Do you ever drive or ride in a car without wearing a seat belt? No   Have you felt unusual stress, anger or loneliness in the last month? No   Who do you live with? Spouse   If you need help, do you have trouble finding someone available to you? No   Have you been bothered in the last four weeks by sexual problems? No   Do you have difficulty concentrating, remembering or making decisions? No       Age-appropriate Screening Schedule:  Refer to the list below for future screening recommendations based on patient's age, sex and/or medical conditions. Orders for these recommended tests are listed in the plan section. The patient has been provided with a written plan.    Health Maintenance   Topic Date Due   • TDAP/TD VACCINES (3 - Td or Tdap) 01/01/2018   • DIABETIC EYE EXAM  05/30/2020   • URINE MICROALBUMIN  12/03/2021   • DIABETIC FOOT EXAM  12/09/2021   • HEMOGLOBIN A1C  06/06/2023   • LIPID PANEL  12/06/2023   • INFLUENZA VACCINE  Completed   • ZOSTER VACCINE  Completed                CMS Preventative Services Quick Reference  Risk Factors Identified During Encounter  colon cancer screen  The above risks/problems have been discussed with the  "patient.  Pertinent information has been shared with the patient in the After Visit Summary.  An After Visit Summary and PPPS were made available to the patient.    Follow Up:   Next Medicare Wellness visit to be scheduled in 1 year.       Additional E&M Note during same encounter follows:  Patient has multiple medical problems which are significant and separately identifiable that require additional work above and beyond the Medicare Wellness Visit.      Chief Complaint  Medicare Wellness-subsequent    Subjective        HPI  James Mckeon is also being seen today for     Dm-2.  Poor control.  Followed by endo.  HTN.  Control is good.  HLD.  Good LDL control.   Anxiety.  Feels well on Lexapro.   CKD3a.  Numbers are stable.     Review of Systems   Respiratory: Negative.    Cardiovascular: Negative.        Objective   Vital Signs:  /78   Pulse 68   Ht 177.8 cm (70\")   Wt 127 kg (279 lb)   SpO2 98%   BMI 40.03 kg/m²     Physical Exam  Constitutional:       Appearance: He is well-developed.   HENT:      Head: Normocephalic and atraumatic.      Right Ear: Hearing and tympanic membrane normal.      Left Ear: Hearing and tympanic membrane normal.      Mouth/Throat:      Pharynx: No posterior oropharyngeal erythema.   Neck:      Thyroid: No thyromegaly.   Cardiovascular:      Rate and Rhythm: Normal rate and regular rhythm.      Heart sounds: Normal heart sounds. No murmur heard.  Pulmonary:      Effort: Pulmonary effort is normal.      Breath sounds: Normal breath sounds.   Abdominal:      General: There is no distension.      Palpations: Abdomen is soft.      Tenderness: There is no abdominal tenderness.   Musculoskeletal:      Cervical back: Neck supple.   Lymphadenopathy:      Cervical: No cervical adenopathy.   Skin:     General: Skin is warm and dry.   Neurological:      Mental Status: He is alert and oriented to person, place, and time.   Psychiatric:         Speech: Speech normal.         Behavior: " Behavior normal.         Thought Content: Thought content normal.         Judgment: Judgment normal.          The following data was reviewed by: Radha Haji MD on 12/14/2022:  Common labs    Common Labs 12/6/22 12/6/22 12/6/22 12/6/22 12/6/22    0830 0830 0830 0830 0830   Glucose  256 (A)      BUN  34 (A)      Creatinine  1.44 (A)      Sodium  138      Potassium  4.6      Chloride  99      Calcium  9.5      Total Protein  6.8      Albumin  4.0      Total Bilirubin  0.4      Alkaline Phosphatase  57      AST (SGOT)  15      ALT (SGPT)  25      WBC 8.6       Hemoglobin 13.8       Hematocrit 42.0       Platelets 170       Total Cholesterol   132     Triglycerides   262 (A)     HDL Cholesterol   35 (A)     LDL Cholesterol    56     Hemoglobin A1C    8.7 (A)    PSA     0.9   (A) Abnormal value       Comments are available for some flowsheets but are not being displayed.                    Assessment and Plan   Diagnoses and all orders for this visit:    1. Medicare annual wellness visit, subsequent (Primary)    2. Anxiety    3. Screening for colon cancer  -     Ambulatory Referral For Screening Colonoscopy    4. Controlled type 2 diabetes mellitus without complication, with long-term current use of insulin (HCC)    5. Benign essential hypertension    6. Mixed hyperlipidemia    7. Stage 3a chronic kidney disease (HCC)  -     furosemide (LASIX) 40 MG tablet; Take 1.5 tablets by mouth Daily.  Dispense: 90 tablet; Refill: 3    Other orders  -     rOPINIRole (Requip) 0.5 MG tablet; Take 1 tablet by mouth Every Night. Take 2 hour before bedtime.  Dispense: 90 tablet; Refill: 3  -     escitalopram (LEXAPRO) 5 MG tablet; Take 1 tablet by mouth Daily.  Dispense: 90 tablet; Refill: 3    Anxiety.  Control is good.  The patient is advised to continue current dosage of Lexapro.   DM-2.  Poor control. Continue with endo.  HTN. Control is good.  The patient is advised to continue current dosage of metoprolol and losartan.  HLD.   Control is good.  The patient is advised to continue current dosage of Crestor and Tricor.   CKD3a.   We discussed the importance of NSAID avoidance.  Continue to monitor.                 Follow Up   Return in about 1 year (around 12/14/2023) for Medicare Wellness.  Patient was given instructions and counseling regarding his condition or for health maintenance advice. Please see specific information pulled into the AVS if appropriate.

## 2024-03-04 ENCOUNTER — TELEPHONE (OUTPATIENT)
Dept: INTERNAL MEDICINE | Facility: CLINIC | Age: 79
End: 2024-03-04
Payer: MEDICARE

## 2024-03-04 RX ORDER — ESCITALOPRAM OXALATE 5 MG/1
5 TABLET ORAL DAILY
Qty: 90 TABLET | Refills: 0 | Status: SHIPPED | OUTPATIENT
Start: 2024-03-04

## 2024-03-05 NOTE — TELEPHONE ENCOUNTER
Ok for HUB to read and schedule:    Left voice mail to schedule appointment with Dr. Haji to continue care and get medication refills.

## 2024-05-29 RX ORDER — ESCITALOPRAM OXALATE 5 MG/1
5 TABLET ORAL DAILY
Qty: 90 TABLET | Refills: 0 | OUTPATIENT
Start: 2024-05-29
